# Patient Record
Sex: MALE | Race: WHITE | NOT HISPANIC OR LATINO | Employment: FULL TIME | URBAN - METROPOLITAN AREA
[De-identification: names, ages, dates, MRNs, and addresses within clinical notes are randomized per-mention and may not be internally consistent; named-entity substitution may affect disease eponyms.]

---

## 2022-01-01 ENCOUNTER — TELEPHONE (OUTPATIENT)
Dept: ONCOLOGY | Facility: CLINIC | Age: 57
End: 2022-01-01

## 2022-01-01 ENCOUNTER — LAB (OUTPATIENT)
Dept: INFUSION THERAPY | Facility: CLINIC | Age: 57
End: 2022-01-01
Attending: INTERNAL MEDICINE
Payer: COMMERCIAL

## 2022-01-01 ENCOUNTER — ONCOLOGY VISIT (OUTPATIENT)
Dept: ONCOLOGY | Facility: CLINIC | Age: 57
End: 2022-01-01
Attending: INTERNAL MEDICINE
Payer: COMMERCIAL

## 2022-01-01 ENCOUNTER — PATIENT OUTREACH (OUTPATIENT)
Dept: ONCOLOGY | Facility: CLINIC | Age: 57
End: 2022-01-01

## 2022-01-01 ENCOUNTER — TRANSFERRED RECORDS (OUTPATIENT)
Dept: HEALTH INFORMATION MANAGEMENT | Facility: CLINIC | Age: 57
End: 2022-01-01

## 2022-01-01 ENCOUNTER — LAB (OUTPATIENT)
Dept: INFUSION THERAPY | Facility: CLINIC | Age: 57
End: 2022-01-01
Attending: PHYSICIAN ASSISTANT
Payer: COMMERCIAL

## 2022-01-01 ENCOUNTER — TRANSCRIBE ORDERS (OUTPATIENT)
Dept: OTHER | Age: 57
End: 2022-01-01

## 2022-01-01 ENCOUNTER — ANCILLARY PROCEDURE (OUTPATIENT)
Dept: CT IMAGING | Facility: CLINIC | Age: 57
End: 2022-01-01
Attending: INTERNAL MEDICINE
Payer: COMMERCIAL

## 2022-01-01 ENCOUNTER — HOSPITAL ENCOUNTER (EMERGENCY)
Facility: CLINIC | Age: 57
Discharge: HOME | End: 2022-10-19
Attending: EMERGENCY MEDICINE | Admitting: EMERGENCY MEDICINE
Payer: COMMERCIAL

## 2022-01-01 ENCOUNTER — HEALTH MAINTENANCE LETTER (OUTPATIENT)
Age: 57
End: 2022-01-01

## 2022-01-01 ENCOUNTER — HOSPITAL ENCOUNTER (OUTPATIENT)
Dept: ULTRASOUND IMAGING | Facility: CLINIC | Age: 57
Discharge: HOME | End: 2022-10-19
Attending: INTERNAL MEDICINE
Payer: COMMERCIAL

## 2022-01-01 ENCOUNTER — PRE VISIT (OUTPATIENT)
Dept: ONCOLOGY | Facility: CLINIC | Age: 57
End: 2022-01-01

## 2022-01-01 ENCOUNTER — ONCOLOGY VISIT (OUTPATIENT)
Dept: ONCOLOGY | Facility: CLINIC | Age: 57
End: 2022-01-01
Attending: PHYSICIAN ASSISTANT
Payer: COMMERCIAL

## 2022-01-01 ENCOUNTER — DOCUMENTATION ONLY (OUTPATIENT)
Dept: INFUSION THERAPY | Facility: CLINIC | Age: 57
End: 2022-01-01

## 2022-01-01 VITALS
BODY MASS INDEX: 23.11 KG/M2 | SYSTOLIC BLOOD PRESSURE: 97 MMHG | TEMPERATURE: 97.8 F | RESPIRATION RATE: 16 BRPM | WEIGHT: 152 LBS | OXYGEN SATURATION: 99 % | HEART RATE: 83 BPM | DIASTOLIC BLOOD PRESSURE: 67 MMHG

## 2022-01-01 VITALS
DIASTOLIC BLOOD PRESSURE: 62 MMHG | WEIGHT: 147 LBS | OXYGEN SATURATION: 99 % | HEIGHT: 68 IN | SYSTOLIC BLOOD PRESSURE: 91 MMHG | TEMPERATURE: 97.3 F | BODY MASS INDEX: 22.28 KG/M2 | RESPIRATION RATE: 16 BRPM | HEART RATE: 91 BPM

## 2022-01-01 VITALS
HEART RATE: 83 BPM | DIASTOLIC BLOOD PRESSURE: 63 MMHG | OXYGEN SATURATION: 99 % | SYSTOLIC BLOOD PRESSURE: 94 MMHG | RESPIRATION RATE: 16 BRPM | TEMPERATURE: 97.9 F

## 2022-01-01 VITALS
SYSTOLIC BLOOD PRESSURE: 103 MMHG | BODY MASS INDEX: 23.52 KG/M2 | TEMPERATURE: 97.5 F | HEART RATE: 91 BPM | OXYGEN SATURATION: 100 % | WEIGHT: 155.2 LBS | HEIGHT: 68 IN | RESPIRATION RATE: 19 BRPM | DIASTOLIC BLOOD PRESSURE: 70 MMHG

## 2022-01-01 VITALS
TEMPERATURE: 98.7 F | WEIGHT: 156.09 LBS | BODY MASS INDEX: 23.66 KG/M2 | HEART RATE: 99 BPM | SYSTOLIC BLOOD PRESSURE: 105 MMHG | RESPIRATION RATE: 18 BRPM | HEIGHT: 68 IN | OXYGEN SATURATION: 99 % | DIASTOLIC BLOOD PRESSURE: 69 MMHG

## 2022-01-01 VITALS
BODY MASS INDEX: 22.2 KG/M2 | OXYGEN SATURATION: 100 % | HEIGHT: 68 IN | DIASTOLIC BLOOD PRESSURE: 73 MMHG | SYSTOLIC BLOOD PRESSURE: 104 MMHG | HEART RATE: 81 BPM | WEIGHT: 146.5 LBS | RESPIRATION RATE: 16 BRPM | TEMPERATURE: 97.1 F

## 2022-01-01 VITALS
DIASTOLIC BLOOD PRESSURE: 61 MMHG | TEMPERATURE: 98 F | HEIGHT: 68 IN | RESPIRATION RATE: 18 BRPM | BODY MASS INDEX: 24.31 KG/M2 | HEART RATE: 102 BPM | SYSTOLIC BLOOD PRESSURE: 99 MMHG | WEIGHT: 160.4 LBS

## 2022-01-01 VITALS
HEART RATE: 67 BPM | DIASTOLIC BLOOD PRESSURE: 63 MMHG | SYSTOLIC BLOOD PRESSURE: 103 MMHG | RESPIRATION RATE: 18 BRPM | OXYGEN SATURATION: 98 % | TEMPERATURE: 97.4 F

## 2022-01-01 DIAGNOSIS — I82.622 ACUTE DEEP VEIN THROMBOSIS (DVT) OF OTHER VEIN OF LEFT UPPER EXTREMITY (H): ICD-10-CM

## 2022-01-01 DIAGNOSIS — C15.9 ESOPHAGEAL ADENOCARCINOMA (H): Primary | ICD-10-CM

## 2022-01-01 DIAGNOSIS — I82.C29 CHRONIC DEEP VEIN THROMBOSIS (DVT) OF INTERNAL JUGULAR VEIN (H): ICD-10-CM

## 2022-01-01 DIAGNOSIS — I82.C29 CHRONIC DEEP VEIN THROMBOSIS (DVT) OF INTERNAL JUGULAR VEIN (H): Primary | ICD-10-CM

## 2022-01-01 DIAGNOSIS — C15.9 ESOPHAGEAL CANCER (H): Primary | ICD-10-CM

## 2022-01-01 DIAGNOSIS — C15.9 ESOPHAGEAL ADENOCARCINOMA (H): ICD-10-CM

## 2022-01-01 LAB
ALBUMIN SERPL BCG-MCNC: 3.1 G/DL (ref 3.5–5.2)
ALBUMIN SERPL BCG-MCNC: 3.1 G/DL (ref 3.5–5.2)
ALBUMIN SERPL BCG-MCNC: 3.2 G/DL (ref 3.5–5.2)
ALBUMIN SERPL-MCNC: 2.7 G/DL (ref 3.4–5)
ALP SERPL-CCNC: 101 U/L (ref 40–129)
ALP SERPL-CCNC: 113 U/L (ref 40–129)
ALP SERPL-CCNC: 89 U/L (ref 40–150)
ALP SERPL-CCNC: 98 U/L (ref 40–129)
ALT SERPL W P-5'-P-CCNC: 27 U/L (ref 0–70)
ALT SERPL W P-5'-P-CCNC: 27 U/L (ref 10–50)
ALT SERPL W P-5'-P-CCNC: 37 U/L (ref 10–50)
ALT SERPL W P-5'-P-CCNC: 42 U/L (ref 10–50)
ANION GAP SERPL CALCULATED.3IONS-SCNC: 10 MMOL/L (ref 7–15)
ANION GAP SERPL CALCULATED.3IONS-SCNC: 3 MMOL/L (ref 3–14)
ANION GAP SERPL CALCULATED.3IONS-SCNC: 7 MMOL/L (ref 7–15)
ANION GAP SERPL CALCULATED.3IONS-SCNC: 9 MMOL/L (ref 7–15)
AST SERPL W P-5'-P-CCNC: 11 U/L (ref 10–50)
AST SERPL W P-5'-P-CCNC: 13 U/L (ref 10–50)
AST SERPL W P-5'-P-CCNC: 7 U/L (ref 0–45)
AST SERPL W P-5'-P-CCNC: 8 U/L (ref 10–50)
BASOPHILS # BLD AUTO: 0 10E3/UL (ref 0–0.2)
BASOPHILS # BLD MANUAL: 0 10E3/UL (ref 0–0.2)
BASOPHILS NFR BLD AUTO: 0 %
BASOPHILS NFR BLD AUTO: 1 %
BASOPHILS NFR BLD AUTO: 2 %
BASOPHILS NFR BLD MANUAL: 0 %
BILIRUB SERPL-MCNC: 0.4 MG/DL
BILIRUB SERPL-MCNC: 0.4 MG/DL
BILIRUB SERPL-MCNC: 0.4 MG/DL (ref 0.2–1.3)
BILIRUB SERPL-MCNC: 0.5 MG/DL
BUN SERPL-MCNC: 10.8 MG/DL (ref 8–23)
BUN SERPL-MCNC: 11.6 MG/DL (ref 8–23)
BUN SERPL-MCNC: 11.9 MG/DL (ref 8–23)
BUN SERPL-MCNC: 13 MG/DL (ref 7–30)
CALCIUM SERPL-MCNC: 8.6 MG/DL (ref 8.5–10.1)
CALCIUM SERPL-MCNC: 8.6 MG/DL (ref 8.6–10)
CALCIUM SERPL-MCNC: 8.7 MG/DL (ref 8.6–10)
CALCIUM SERPL-MCNC: 8.8 MG/DL (ref 8.6–10)
CEA SERPL-MCNC: 14.3 NG/ML
CEA SERPL-MCNC: 3.2 NG/ML
CEA SERPL-MCNC: 6.9 NG/ML
CHLORIDE BLD-SCNC: 106 MMOL/L (ref 94–109)
CHLORIDE SERPL-SCNC: 100 MMOL/L (ref 98–107)
CHLORIDE SERPL-SCNC: 105 MMOL/L (ref 98–107)
CHLORIDE SERPL-SCNC: 106 MMOL/L (ref 98–107)
CO2 SERPL-SCNC: 29 MMOL/L (ref 20–32)
CREAT SERPL-MCNC: 0.69 MG/DL (ref 0.67–1.17)
CREAT SERPL-MCNC: 0.71 MG/DL (ref 0.66–1.25)
CREAT SERPL-MCNC: 0.75 MG/DL (ref 0.67–1.17)
CREAT SERPL-MCNC: 0.79 MG/DL (ref 0.67–1.17)
DEPRECATED HCO3 PLAS-SCNC: 25 MMOL/L (ref 22–29)
DEPRECATED HCO3 PLAS-SCNC: 25 MMOL/L (ref 22–29)
DEPRECATED HCO3 PLAS-SCNC: 31 MMOL/L (ref 22–29)
EOSINOPHIL # BLD AUTO: 0 10E3/UL (ref 0–0.7)
EOSINOPHIL # BLD MANUAL: 0 10E3/UL (ref 0–0.7)
EOSINOPHIL NFR BLD AUTO: 0 %
EOSINOPHIL NFR BLD AUTO: 1 %
EOSINOPHIL NFR BLD AUTO: 2 %
EOSINOPHIL NFR BLD MANUAL: 0 %
ERYTHROCYTE [DISTWIDTH] IN BLOOD BY AUTOMATED COUNT: 19.2 % (ref 10–15)
ERYTHROCYTE [DISTWIDTH] IN BLOOD BY AUTOMATED COUNT: 19.6 % (ref 10–15)
ERYTHROCYTE [DISTWIDTH] IN BLOOD BY AUTOMATED COUNT: 19.7 % (ref 10–15)
ERYTHROCYTE [DISTWIDTH] IN BLOOD BY AUTOMATED COUNT: 20.7 % (ref 10–15)
GFR SERPL CREATININE-BSD FRML MDRD: >90 ML/MIN/1.73M2
GLUCOSE BLD-MCNC: 139 MG/DL (ref 70–99)
GLUCOSE SERPL-MCNC: 112 MG/DL (ref 70–99)
GLUCOSE SERPL-MCNC: 142 MG/DL (ref 70–99)
GLUCOSE SERPL-MCNC: 76 MG/DL (ref 70–99)
HCT VFR BLD AUTO: 34 % (ref 40–53)
HCT VFR BLD AUTO: 34.3 % (ref 40–53)
HCT VFR BLD AUTO: 34.5 % (ref 40–53)
HCT VFR BLD AUTO: 36.5 % (ref 40–53)
HGB BLD-MCNC: 10.5 G/DL (ref 13.3–17.7)
HGB BLD-MCNC: 10.7 G/DL (ref 13.3–17.7)
HGB BLD-MCNC: 11.3 G/DL (ref 13.3–17.7)
HGB BLD-MCNC: 11.5 G/DL (ref 13.3–17.7)
IMM GRANULOCYTES # BLD: 0 10E3/UL
IMM GRANULOCYTES # BLD: 0 10E3/UL
IMM GRANULOCYTES # BLD: 0.1 10E3/UL
IMM GRANULOCYTES NFR BLD: 1 %
IMM GRANULOCYTES NFR BLD: 1 %
IMM GRANULOCYTES NFR BLD: 2 %
LYMPHOCYTES # BLD AUTO: 0.6 10E3/UL (ref 0.8–5.3)
LYMPHOCYTES # BLD AUTO: 0.8 10E3/UL (ref 0.8–5.3)
LYMPHOCYTES # BLD AUTO: 1.2 10E3/UL (ref 0.8–5.3)
LYMPHOCYTES # BLD MANUAL: 1.1 10E3/UL (ref 0.8–5.3)
LYMPHOCYTES NFR BLD AUTO: 20 %
LYMPHOCYTES NFR BLD AUTO: 27 %
LYMPHOCYTES NFR BLD AUTO: 32 %
LYMPHOCYTES NFR BLD MANUAL: 25 %
MCH RBC QN AUTO: 30 PG (ref 26.5–33)
MCH RBC QN AUTO: 30.4 PG (ref 26.5–33)
MCH RBC QN AUTO: 31.5 PG (ref 26.5–33)
MCH RBC QN AUTO: 32.8 PG (ref 26.5–33)
MCHC RBC AUTO-ENTMCNC: 30.9 G/DL (ref 31.5–36.5)
MCHC RBC AUTO-ENTMCNC: 31.2 G/DL (ref 31.5–36.5)
MCHC RBC AUTO-ENTMCNC: 31.5 G/DL (ref 31.5–36.5)
MCHC RBC AUTO-ENTMCNC: 32.8 G/DL (ref 31.5–36.5)
MCV RBC AUTO: 100 FL (ref 78–100)
MCV RBC AUTO: 101 FL (ref 78–100)
MCV RBC AUTO: 97 FL (ref 78–100)
MCV RBC AUTO: 97 FL (ref 78–100)
METAMYELOCYTES # BLD MANUAL: 0.4 10E3/UL
METAMYELOCYTES NFR BLD MANUAL: 9 %
MONOCYTES # BLD AUTO: 0.4 10E3/UL (ref 0–1.3)
MONOCYTES # BLD AUTO: 0.4 10E3/UL (ref 0–1.3)
MONOCYTES # BLD AUTO: 0.7 10E3/UL (ref 0–1.3)
MONOCYTES # BLD MANUAL: 0.2 10E3/UL (ref 0–1.3)
MONOCYTES NFR BLD AUTO: 18 %
MONOCYTES NFR BLD AUTO: 23 %
MONOCYTES NFR BLD AUTO: 6 %
MONOCYTES NFR BLD MANUAL: 5 %
NEUTROPHILS # BLD AUTO: 0.9 10E3/UL (ref 1.6–8.3)
NEUTROPHILS # BLD AUTO: 1.4 10E3/UL (ref 1.6–8.3)
NEUTROPHILS # BLD AUTO: 4.4 10E3/UL (ref 1.6–8.3)
NEUTROPHILS # BLD MANUAL: 2.7 10E3/UL (ref 1.6–8.3)
NEUTROPHILS NFR BLD AUTO: 45 %
NEUTROPHILS NFR BLD AUTO: 47 %
NEUTROPHILS NFR BLD AUTO: 72 %
NEUTROPHILS NFR BLD MANUAL: 61 %
NRBC # BLD AUTO: 0 10E3/UL
NRBC # BLD AUTO: 0.1 10E3/UL
NRBC BLD AUTO-RTO: 0 /100
NRBC BLD AUTO-RTO: 0 /100
NRBC BLD AUTO-RTO: 1 /100
NRBC BLD MANUAL-RTO: 2 %
PLAT MORPH BLD: ABNORMAL
PLAT MORPH BLD: NORMAL
PLATELET # BLD AUTO: 155 10E3/UL (ref 150–450)
PLATELET # BLD AUTO: 162 10E3/UL (ref 150–450)
PLATELET # BLD AUTO: 261 10E3/UL (ref 150–450)
PLATELET # BLD AUTO: 351 10E3/UL (ref 150–450)
POTASSIUM BLD-SCNC: 3.9 MMOL/L (ref 3.4–5.3)
POTASSIUM SERPL-SCNC: 3.8 MMOL/L (ref 3.4–5.3)
POTASSIUM SERPL-SCNC: 4.2 MMOL/L (ref 3.4–5.3)
POTASSIUM SERPL-SCNC: 4.2 MMOL/L (ref 3.4–5.3)
PROT SERPL-MCNC: 5.7 G/DL (ref 6.4–8.3)
PROT SERPL-MCNC: 5.8 G/DL (ref 6.4–8.3)
PROT SERPL-MCNC: 6 G/DL (ref 6.4–8.3)
PROT SERPL-MCNC: 6.5 G/DL (ref 6.8–8.8)
RBC # BLD AUTO: 3.4 10E6/UL (ref 4.4–5.9)
RBC # BLD AUTO: 3.45 10E6/UL (ref 4.4–5.9)
RBC # BLD AUTO: 3.5 10E6/UL (ref 4.4–5.9)
RBC # BLD AUTO: 3.78 10E6/UL (ref 4.4–5.9)
RBC MORPH BLD: ABNORMAL
RBC MORPH BLD: NORMAL
SODIUM SERPL-SCNC: 138 MMOL/L (ref 133–144)
SODIUM SERPL-SCNC: 138 MMOL/L (ref 136–145)
SODIUM SERPL-SCNC: 140 MMOL/L (ref 136–145)
SODIUM SERPL-SCNC: 140 MMOL/L (ref 136–145)
TSH SERPL DL<=0.005 MIU/L-ACNC: 0.92 MU/L (ref 0.4–4)
TSH SERPL DL<=0.005 MIU/L-ACNC: 0.93 UIU/ML (ref 0.3–4.2)
TSH SERPL DL<=0.005 MIU/L-ACNC: 3.27 UIU/ML (ref 0.3–4.2)
WBC # BLD AUTO: 2 10E3/UL (ref 4–11)
WBC # BLD AUTO: 3 10E3/UL (ref 4–11)
WBC # BLD AUTO: 4.4 10E3/UL (ref 4–11)
WBC # BLD AUTO: 6 10E3/UL (ref 4–11)

## 2022-01-01 PROCEDURE — 250N000011 HC RX IP 250 OP 636: Performed by: INTERNAL MEDICINE

## 2022-01-01 PROCEDURE — 36591 DRAW BLOOD OFF VENOUS DEVICE: CPT

## 2022-01-01 PROCEDURE — 36415 COLL VENOUS BLD VENIPUNCTURE: CPT | Performed by: INTERNAL MEDICINE

## 2022-01-01 PROCEDURE — 96415 CHEMO IV INFUSION ADDL HR: CPT

## 2022-01-01 PROCEDURE — 96411 CHEMO IV PUSH ADDL DRUG: CPT

## 2022-01-01 PROCEDURE — 82378 CARCINOEMBRYONIC ANTIGEN: CPT | Performed by: INTERNAL MEDICINE

## 2022-01-01 PROCEDURE — 96417 CHEMO IV INFUS EACH ADDL SEQ: CPT

## 2022-01-01 PROCEDURE — 250N000011 HC RX IP 250 OP 636: Performed by: PHYSICIAN ASSISTANT

## 2022-01-01 PROCEDURE — G0498 CHEMO EXTEND IV INFUS W/PUMP: HCPCS

## 2022-01-01 PROCEDURE — 99213 OFFICE O/P EST LOW 20 MIN: CPT | Performed by: PHYSICIAN ASSISTANT

## 2022-01-01 PROCEDURE — 97802 MEDICAL NUTRITION INDIV IN: CPT | Mod: GT | Performed by: DIETITIAN, REGISTERED

## 2022-01-01 PROCEDURE — 84443 ASSAY THYROID STIM HORMONE: CPT | Performed by: PHYSICIAN ASSISTANT

## 2022-01-01 PROCEDURE — 99205 OFFICE O/P NEW HI 60 MIN: CPT | Performed by: INTERNAL MEDICINE

## 2022-01-01 PROCEDURE — 93970 EXTREMITY STUDY: CPT | Mod: XS

## 2022-01-01 PROCEDURE — 96413 CHEMO IV INFUSION 1 HR: CPT

## 2022-01-01 PROCEDURE — 96367 TX/PROPH/DG ADDL SEQ IV INF: CPT

## 2022-01-01 PROCEDURE — 96368 THER/DIAG CONCURRENT INF: CPT

## 2022-01-01 PROCEDURE — 80053 COMPREHEN METABOLIC PANEL: CPT | Performed by: INTERNAL MEDICINE

## 2022-01-01 PROCEDURE — 84443 ASSAY THYROID STIM HORMONE: CPT | Performed by: INTERNAL MEDICINE

## 2022-01-01 PROCEDURE — 93970 EXTREMITY STUDY: CPT

## 2022-01-01 PROCEDURE — 258N000003 HC RX IP 258 OP 636: Performed by: PHYSICIAN ASSISTANT

## 2022-01-01 PROCEDURE — 258N000003 HC RX IP 258 OP 636: Performed by: INTERNAL MEDICINE

## 2022-01-01 PROCEDURE — 80053 COMPREHEN METABOLIC PANEL: CPT | Performed by: PHYSICIAN ASSISTANT

## 2022-01-01 PROCEDURE — 99214 OFFICE O/P EST MOD 30 MIN: CPT | Performed by: INTERNAL MEDICINE

## 2022-01-01 PROCEDURE — G0463 HOSPITAL OUTPT CLINIC VISIT: HCPCS

## 2022-01-01 PROCEDURE — 74177 CT ABD & PELVIS W/CONTRAST: CPT

## 2022-01-01 PROCEDURE — 85025 COMPLETE CBC W/AUTO DIFF WBC: CPT | Performed by: INTERNAL MEDICINE

## 2022-01-01 PROCEDURE — 85027 COMPLETE CBC AUTOMATED: CPT | Performed by: PHYSICIAN ASSISTANT

## 2022-01-01 PROCEDURE — 255N000002 HC RX 255 OP 636: Performed by: INTERNAL MEDICINE

## 2022-01-01 PROCEDURE — 85004 AUTOMATED DIFF WBC COUNT: CPT | Performed by: INTERNAL MEDICINE

## 2022-01-01 PROCEDURE — 99283 EMERGENCY DEPT VISIT LOW MDM: CPT

## 2022-01-01 PROCEDURE — 82040 ASSAY OF SERUM ALBUMIN: CPT | Performed by: PHYSICIAN ASSISTANT

## 2022-01-01 PROCEDURE — 85007 BL SMEAR W/DIFF WBC COUNT: CPT | Performed by: PHYSICIAN ASSISTANT

## 2022-01-01 PROCEDURE — 96375 TX/PRO/DX INJ NEW DRUG ADDON: CPT

## 2022-01-01 PROCEDURE — 82374 ASSAY BLOOD CARBON DIOXIDE: CPT | Performed by: INTERNAL MEDICINE

## 2022-01-01 RX ORDER — HEPARIN SODIUM (PORCINE) LOCK FLUSH IV SOLN 100 UNIT/ML 100 UNIT/ML
5 SOLUTION INTRAVENOUS
Status: DISCONTINUED | OUTPATIENT
Start: 2022-01-01 | End: 2022-01-01 | Stop reason: HOSPADM

## 2022-01-01 RX ORDER — LORAZEPAM 2 MG/ML
0.5 INJECTION INTRAMUSCULAR EVERY 4 HOURS PRN
Status: CANCELLED | OUTPATIENT
Start: 2022-01-01

## 2022-01-01 RX ORDER — FLUOROURACIL 50 MG/ML
400 INJECTION, SOLUTION INTRAVENOUS ONCE
Status: COMPLETED | OUTPATIENT
Start: 2022-01-01 | End: 2022-01-01

## 2022-01-01 RX ORDER — HEPARIN SODIUM,PORCINE 10 UNIT/ML
5 VIAL (ML) INTRAVENOUS
Status: DISCONTINUED | OUTPATIENT
Start: 2022-01-01 | End: 2022-01-01 | Stop reason: HOSPADM

## 2022-01-01 RX ORDER — DIPHENHYDRAMINE HYDROCHLORIDE 50 MG/ML
50 INJECTION INTRAMUSCULAR; INTRAVENOUS
Status: CANCELLED
Start: 2022-01-01

## 2022-01-01 RX ORDER — HEPARIN SODIUM,PORCINE 10 UNIT/ML
5 VIAL (ML) INTRAVENOUS
Status: CANCELLED | OUTPATIENT
Start: 2022-01-01

## 2022-01-01 RX ORDER — ALBUTEROL SULFATE 0.83 MG/ML
2.5 SOLUTION RESPIRATORY (INHALATION)
Status: CANCELLED | OUTPATIENT
Start: 2022-01-01

## 2022-01-01 RX ORDER — HEPARIN SODIUM (PORCINE) LOCK FLUSH IV SOLN 100 UNIT/ML 100 UNIT/ML
5 SOLUTION INTRAVENOUS
Status: CANCELLED | OUTPATIENT
Start: 2022-01-01

## 2022-01-01 RX ORDER — ONDANSETRON 8 MG/1
8 TABLET, FILM COATED ORAL EVERY 8 HOURS PRN
Qty: 30 TABLET | Refills: 1 | Status: SHIPPED | OUTPATIENT
Start: 2022-01-01 | End: 2023-01-01

## 2022-01-01 RX ORDER — METHYLPREDNISOLONE SODIUM SUCCINATE 125 MG/2ML
125 INJECTION, POWDER, LYOPHILIZED, FOR SOLUTION INTRAMUSCULAR; INTRAVENOUS
Status: CANCELLED
Start: 2022-01-01

## 2022-01-01 RX ORDER — ONDANSETRON 2 MG/ML
8 INJECTION INTRAMUSCULAR; INTRAVENOUS ONCE
Status: CANCELLED | OUTPATIENT
Start: 2022-01-01

## 2022-01-01 RX ORDER — ALBUTEROL SULFATE 90 UG/1
1-2 AEROSOL, METERED RESPIRATORY (INHALATION)
Status: CANCELLED
Start: 2022-01-01

## 2022-01-01 RX ORDER — EPINEPHRINE 1 MG/ML
0.3 INJECTION, SOLUTION INTRAMUSCULAR; SUBCUTANEOUS EVERY 5 MIN PRN
Status: CANCELLED | OUTPATIENT
Start: 2022-01-01

## 2022-01-01 RX ORDER — FLUOROURACIL 50 MG/ML
400 INJECTION, SOLUTION INTRAVENOUS ONCE
Status: CANCELLED | OUTPATIENT
Start: 2022-01-01

## 2022-01-01 RX ORDER — DEXAMETHASONE 4 MG/1
4 TABLET ORAL 2 TIMES DAILY WITH MEALS
Status: ON HOLD | COMMUNITY
End: 2023-01-01

## 2022-01-01 RX ORDER — ACETAMINOPHEN 325 MG/1
650 TABLET ORAL PRN
Status: ON HOLD | COMMUNITY
End: 2023-01-01

## 2022-01-01 RX ORDER — ONDANSETRON 2 MG/ML
8 INJECTION INTRAMUSCULAR; INTRAVENOUS ONCE
Status: COMPLETED | OUTPATIENT
Start: 2022-01-01 | End: 2022-01-01

## 2022-01-01 RX ORDER — MEPERIDINE HYDROCHLORIDE 25 MG/ML
25 INJECTION INTRAMUSCULAR; INTRAVENOUS; SUBCUTANEOUS EVERY 30 MIN PRN
Status: CANCELLED | OUTPATIENT
Start: 2022-01-01

## 2022-01-01 RX ORDER — PROCHLORPERAZINE MALEATE 10 MG
10 TABLET ORAL EVERY 6 HOURS PRN
Status: ON HOLD | COMMUNITY
End: 2023-01-01

## 2022-01-01 RX ADMIN — Medication 5 ML: at 07:41

## 2022-01-01 RX ADMIN — SODIUM CHLORIDE 240 MG: 9 INJECTION, SOLUTION INTRAVENOUS at 13:49

## 2022-01-01 RX ADMIN — OXALIPLATIN 150 MG: 100 INJECTION, SOLUTION, CONCENTRATE INTRAVENOUS at 10:23

## 2022-01-01 RX ADMIN — FOSAPREPITANT: 150 INJECTION, POWDER, LYOPHILIZED, FOR SOLUTION INTRAVENOUS at 08:58

## 2022-01-01 RX ADMIN — DEXTROSE MONOHYDRATE 250 ML: 50 INJECTION, SOLUTION INTRAVENOUS at 10:18

## 2022-01-01 RX ADMIN — FLUOROURACIL 715 MG: 50 INJECTION, SOLUTION INTRAVENOUS at 16:26

## 2022-01-01 RX ADMIN — LEUCOVORIN CALCIUM 650 MG: 200 INJECTION, POWDER, LYOPHILIZED, FOR SUSPENSION INTRAMUSCULAR; INTRAVENOUS at 14:29

## 2022-01-01 RX ADMIN — OXALIPLATIN 150 MG: 100 INJECTION, SOLUTION, CONCENTRATE INTRAVENOUS at 14:29

## 2022-01-01 RX ADMIN — ONDANSETRON 8 MG: 2 INJECTION INTRAMUSCULAR; INTRAVENOUS at 13:16

## 2022-01-01 RX ADMIN — ONDANSETRON 8 MG: 2 INJECTION INTRAMUSCULAR; INTRAVENOUS at 08:58

## 2022-01-01 RX ADMIN — DEXAMETHASONE SODIUM PHOSPHATE: 10 INJECTION, SOLUTION INTRAMUSCULAR; INTRAVENOUS at 13:18

## 2022-01-01 RX ADMIN — SODIUM CHLORIDE 1000 ML: 9 INJECTION, SOLUTION INTRAVENOUS at 08:39

## 2022-01-01 RX ADMIN — SODIUM CHLORIDE 240 MG: 9 INJECTION, SOLUTION INTRAVENOUS at 09:31

## 2022-01-01 RX ADMIN — Medication 5 ML: at 09:57

## 2022-01-01 RX ADMIN — DEXTROSE MONOHYDRATE 250 ML: 50 INJECTION, SOLUTION INTRAVENOUS at 14:23

## 2022-01-01 RX ADMIN — Medication 5 ML: at 14:06

## 2022-01-01 RX ADMIN — FLUOROURACIL 715 MG: 50 INJECTION, SOLUTION INTRAVENOUS at 12:29

## 2022-01-01 RX ADMIN — SODIUM CHLORIDE 250 ML: 9 INJECTION, SOLUTION INTRAVENOUS at 13:16

## 2022-01-01 RX ADMIN — LEUCOVORIN CALCIUM 650 MG: 200 INJECTION, POWDER, LYOPHILIZED, FOR SUSPENSION INTRAMUSCULAR; INTRAVENOUS at 10:19

## 2022-01-01 RX ADMIN — Medication 5 ML: at 10:33

## 2022-01-01 RX ADMIN — IOHEXOL 100 ML: 350 INJECTION, SOLUTION INTRAVENOUS at 10:28

## 2022-01-01 ASSESSMENT — PAIN SCALES - GENERAL
PAINLEVEL: NO PAIN (0)

## 2022-06-30 ENCOUNTER — TRANSFERRED RECORDS (OUTPATIENT)
Dept: HEALTH INFORMATION MANAGEMENT | Facility: CLINIC | Age: 57
End: 2022-06-30

## 2022-09-23 NOTE — TELEPHONE ENCOUNTER
Action October 4, 2022 10:32 AM ABT   Action Taken Images were resolved under incorrect MRN, [facility] notified and they will resolve under correct MRN.     Action October 1, 2022 11:56 AM ABT   Action Taken Sent email to Deedee for STAT/ASAP upload for imaging discs     Action September 26, 2022 10:21 AM ABT   Action Taken Imaging disc from [facility] [place] (08/08/22: PET), and [facility] (09/08/22: MR MRCP, and 07/21/22 : CT CAP) received and taken to Tay for upload     Action    Action Taken 9/23/22  WT from NPS, spoke w/ Radha -     Pt was originally diagnosed 6/30/2022 @ [facility] in [place], [place]    Pt had a PET Scan done 8/8/22, unsure of location.     Pt's Oncologist is Dr. Roge Jones - notes do not appear in CE.     Radha advised pt had prior work up w/ a GI specialist, including a Barium Study ([facility]). Unsure of location of GI provider. Radha will look through notes & update us shortly.     Discussed possibility of needing an CRISTIAN to obtain records. Emailed CRISTIAN. Radha confirmed receipt, will have brother fill out & return to us.     Spoke w/ Dr. Wagner' Medical Records team, ph: [phone], fax: [phone]. Faxed records request. They advised pt's PET was done @ [facility] (located in the same building as Dr. Laughlin).     Spoke w/ [facility] ph: [phone], fax: [phone] - faxed request. They advised disc would go out in the mail today.  11:04 AM    1:46 PM ABT  Records from [facility] and [facility] [place] received and sent to HIM for upload.     RECORDS STATUS - ALL OTHER DIAGNOSIS      RECORDS RECEIVED FROM: [facility], [facility], [facility], [facility]   DATE RECEIVED:    NOTES STATUS DETAILS   OFFICE NOTE from referring provider Self    OFFICE NOTE from medical oncologist  Received 9/23 09/15/22: Dr. Roge Laughlin   DISCHARGE SUMMARY from hospital MAU - [facility]  22   DISCHARGE REPORT from the ER CE - [facility]  22   OPERATIVE REPORT  - [facility] 22: EGD   MEDICATION LIST  [facility]   LABS     PATHOLOGY REPORTS Req 10/01-Nicole  [employer] 22: [id number]    ANYTHING RELATED TO DIAGNOSIS Epic/CE 9/15/22   IMAGING (NEED IMAGES & REPORT)  [facility] [employer]    [facility] [employer]     [facility] [employer]   CT SCANS PACS 22: [facility]   MRI PACS 22: [facility]   FL/Barium Swallow Requested 22: [facility]   PET PACS 22: PET Tumor

## 2022-10-03 NOTE — PROGRESS NOTES
[facility]    Hematology/Oncology New Patient Note      Today's Date: 10/4/22    Reason for Consultation: Esophageal cancer      HISTORY OF PRESENT ILLNESS: Noah Morrison is a 57 year old male who presents with stage IV esophageal adenocarcinoma.     Patient developed dysphagia to solids beginning in mid-May 2022 accomopanied by unintentional >35 lb weight loss. Esophagogram was performed on 6/29/22: Significant narrowing and mucosal irregularity involving the distal thoracic esophagus with partial obstruction, most likely representing an esophageal carcinoma. A significant esophagitis is an additional although less likely possibility.     EGD was performed on 6/30/22 with near obstructing mass int he thoracic esophagus approximately 40 cm from the incisors. Path returned as poorly differentiated adenocarcinoma with focal signet ring morphology.    Evaluated by med onc and rad onc with PET/CT ordered. PEG tube was placed with IR given continued weight loss.    Per record review (9/15/22- [facility]):   -Esophageal cancer, middle third:  -6/30/22: Esophageal biopsy: poorly differentiated adenocarcinoma with focal signet ring morphology (HER2 0+; no evidence of MSI-H).  -7/12/22: CEA 21.1.  -8/8/22: PET scan: Distal esophageal thickening with intense hypermetabolic activity consistent with known esophgeal cancer. Abnormal hypermetabolic metastatic cervical, supraclavicular, peritoneal, and retroperitoneal LAD.   -8/9-8/23/22: Concurrent chemoradiation with weekly carboplatin plus paclitaxel.  -CARIS: PDL CPS5, ERBB2 negative, MSI stable, MMR proficient, TMB low, mutations identified in AXIN1, CDKN21, and TP53.  -Started on FOLFOX plus nivolumab on 9/15/22. Last treatment on 9/29/22.    Patient has had MRCP performed due to elevated liver enzymes which revealed a peripancreatic LN causing a blockage and dilation of the CBD. A metal stent was placed and LFTs  "improved. He continues to have weight loss and pain. He is on oxycodone.     He was using the feeding tube previously 3-4x/day, but has not used in the last 1-2 weeks. He is back to normal meals. No boost. Weight nadired down to 137 lbs and he is now 146 lbs.     He previously worked as a  at a hotel in [place]. Relocated from [place]. He lived there for five years. He is currently living with his sister, Radha.       REVIEW OF SYSTEMS:   A 14 point ROS was reviewed with pertinent positives and negatives in the HPI.        HOME MEDICATIONS:  No current outpatient medications on file.         ALLERGIES:  NKDA.    PAST MEDICAL HISTORY:  Esophageal cancer    PAST SURGICAL HISTORY:  Tonsillectomy  Port placement  PEG tube placement    SOCIAL HISTORY:  Social History     Socioeconomic History     Marital status: Single     Spouse name: Not on file     Number of children: Not on file     Years of education: Not on file     Highest education level: Not on file   Occupational History     Not on file   Tobacco Use     Smoking status: Not on file     Smokeless tobacco: Not on file   Substance and Sexual Activity     Alcohol use: Not on file     Drug use: Not on file     Sexual activity: Not on file   Other Topics Concern     Not on file   Social History Narrative     Not on file     Social Determinants of Health     Financial Resource Strain: Not on file   Food Insecurity: Not on file   Transportation Needs: Not on file   Physical Activity: Not on file   Stress: Not on file   Social Connections: Not on file   Intimate Partner Violence: Not on file   Housing Stability: Not on file     Smoked 1 PPD starting at age 38, quit at diagnosis.     FAMILY HISTORY:  Father: 84, living, esophageal cancer.   Mother: 80.    PHYSICAL EXAM:  Vital signs:  /73   Pulse 81   Temp 97.1  F (36.2  C) (Tympanic)   Resp 16   Ht 1.727 m (5' 8\")   Wt 66.5 kg (146 lb 8 oz)   SpO2 100%   BMI 22.28 kg/m     ECO-1  GENERAL/CONSTITUTIONAL: No " acute distress.  EYES: Pupils are equal, round, and react to light and accommodation. Extraocular movements intact.  No scleral icterus.  ENT/MOUTH: Neck supple. Oropharynx clear, no mucositis.  LYMPH: No anterior cervical, posterior cervical, supraclavicular, axillary or inguinal adenopathy.   RESPIRATORY: Clear to auscultation bilaterally. No crackles or wheezing.   CARDIOVASCULAR: Regular rate and rhythm without murmurs, gallops, or rubs.  GASTROINTESTINAL: No hepatosplenomegaly, masses, or tenderness. The patient has normal bowel sounds. No guarding.  No distention.  MUSCULOSKELETAL: Warm and well-perfused, no cyanosis, clubbing, or edema.  NEUROLOGIC: Cranial nerves II-XII are intact. Alert, oriented, answers questions appropriately.  INTEGUMENTARY: No rashes or jaundice.  GAIT: Steady, does not use assistive device      LABS:  9/15/22: WBC 6.10, Hb 13.7, hematocrit 40.5, MCV 89.6, .     Na 137, K 4.5, Cl 101, CO2 25, Cr 1.11, total protein 7.7, albumin 3.1. T. Bili 0.9, Alk phos 323, AST 13, ALT 90.      PATHOLOGY:  EGD 6/30/22:  Procedural Details/Findings:   After informed consent the patient was brought to the endoscopy suite. IV sedation was administered. The endoscope was advanced under direct vision until the mass in mid esophagus was encountered. The mass was approximately 40 cm from the incisors. Multiple biopsies were obtained of this mass with the cold biopsy forceps. The area was narrow enough that we could not pass the scope through it.     There where no complications.    Findings:  Nearly obstructing mass in the thoracic esophagus. It appeared to be neoplastic in nature.     PATHOLOGY - 07/08/2022 1:58 PM MDT    CASE: [id number]  PATIENT: DEBO GRAVES    SPECIMENS SUBMITTED:  A. ESOPHAGEAL MASS BX      DIAGNOSIS:  A.   ESOPHAGEAL MASS, ENDOSCOPIC BIOPSY:      -    POSITIVE FOR POORLY-DIFFERENTIATED ADENOCARCINOMA WITH FOCAL  SIGNET RING MORPHOLOGY (SEE NOTE)    NOTE:  The IHC stains  "were performed and showed malignant cells to be positive for  CK7(+) and negative for CD-45(-),  CD-30(-), CK5/p-40(-), Melanoma cocktail(-). The tissue in the block A1 is  available for additional molecular  testing as clinically indicated. Correlation with clinical history and  radiology images is recommended.      IMMUNOHISTOCHEMICAL STUDIES:  TISSUE BLOCK:  A1  HER2 PROTEIN EXPRESSION (0-3+):    0+  TECHNICAL COMMENT:  Following collection, the tissue sample was promptly (< 60 minutes) placed  in 10% neutral buffered formalin. Fixation was for 6-72 hours, followed by  paraffin embedding.  A polymer-based immunohistochemical analysis of estrogen/progesterone  receptor and Her2 protein expression (clones SP1, 1E2 and 4B5,  respectively) was performed. The steroid receptor result is positive if at  least 1% of tumor cell nuclei show specific staining. For positive cases  the \"fraction of cells staining\" is a visual estimate subject to observer  variation. For Her2, membrane positivity was scored according to the 2014  ASCO/CAP consensus (Arch Pathol Lab Med 138:241-56, 2014): 0-1+ is negative  for overexpression, 2+ is equivocal, and 3+ is positive.      MISMATCH REPAIR PROTEIN EXPRESSION  MLH-1:    Positive (protein expressed)  PMS-2:    Positive (protein expressed)  MSH-2:    Positive (protein expressed)  MSH-6:    Positive (protein expressed)  Comment:  No evidence of MSI-high.    CARIS:  PD-L1 CPS 5, positive.  ERBB2 HER2/silvina negative.    IMAGIN2022 11:14 AM MDT    IMPRESSION: Significant narrowing and mucosal irregularity involving  the distal thoracic esophagus with partial obstruction, most likely  representing an esophageal carcinoma. A significant esophagitis is an  additional although less likely possibility. Direct visualization  will be necessary.       Imaging Results - FL esophagram complete (2022 10:09 AM MDT)  Narrative   2022 11:14 AM MDT    CLINICAL INDICATION:  dysphagia  TC "  .30 ISIDRO FLUORO, 12 mGY    EXAM:  ESOPHAGRAM BARIUM SWALLOW      COMPARISON:  None    FINDINGS:    Approximately 30 seconds of fluoroscopy time were used and 12 images  obtained.    Barium and air were administered orally under fluoroscopic guidance.  The pharynx and cervical esophagus were not studied due to a partial  obstruction in the distal thoracic esophagus which did not allow  further administration of barium.    The distal fourth of the thoracic esophagus was grossly abnormal with  significant mucosal irregularity and narrowing with abrupt transition  and moderate proximal esophageal distention and air-fluid level,  consistent with partial obstruction. The visualized GE junction  appear grossly normal. There was no hiatal hernia.     PET 8/8/22:  1.  Distal esophageal thickening with intense hypermetabolic activity consistent with known esophageal cancer.  2.  Abnormal hypermetabolic metastatic cervical, supraclavicular, peritoneal, and retroperitoneal lymphadenopathy.    ASSESSMENT/PLAN:  Noah Morrison is a 57 year old male with:    1.  Metastatic esophageal adenocarcinoma (PDL1 CPS5, HER2 negative, JUNIOR)  -6/30/22: Esophageal biopsy: poorly differentiated adenocarcinoma with focal signet ring morphology (HER2 0+; no evidence of MSI-H).  -7/12/22: CEA 21.1.  -8/8/22: PET scan: Distal esophageal thickening with intense hypermetabolic activity consistent with known esophgeal cancer. Abnormal hypermetabolic metastatic cervical, supraclavicular, peritoneal, and retroperitoneal LAD.   -8/9-8/23/22: Concurrent chemoradiation with weekly carboplatin plus paclitaxel.  -CARIS: PDL CPS5, ERBB2 negative, MSI stable, MMR proficient, TMB low, mutations identified in AXIN1, CDKN21, and TP53.  -Started on FOLFOX plus nivolumab on 9/15/22. Last treatment on 9/29/22.  -Patient is already with port and will resume scheduling of FOLFOX plus nivolumab.  Reviewed with patient nivolumab is indicated given his CPS of 5  based on the Checkmate 649 trial. He is aware of the palliative intent of treatment.  -Patient has been tolerating treatment well and actually has had 15 pound weight gain and is no longer requiring the use of his PEG tube.  -Patient has not yet met with genetics clinic.  His father with history of esophageal cancer as well.  Unknown if this was adenocarcinoma or squamous.  Will review if patient is meeting criteria for genetics referral.  -Port and chemotherapy education provided again today in clinic. He has consented to continued treatment.     2.  Malnutrition with PEG tube placement  -No longer requiring at this time.  -He prefers to hold on nutrition consultation at this time.    3.  Chronic pain  -He is not really requiring the use of oxycodone at this time.    4.  Follow-up on 10/13/2022 with C3D1 of treatment. Scans will be due after that.       Complexity: HIGH.     Maria Alejandra Arce DO  Hematology/Oncology  [facility]

## 2022-10-04 PROBLEM — C15.9 ESOPHAGEAL ADENOCARCINOMA (H): Status: ACTIVE | Noted: 2022-01-01

## 2022-10-04 NOTE — NURSING NOTE
"Oncology Rooming Note    October 4, 2022 9:18 AM   Noah Morrison is a 57 year old male who presents for:    Chief Complaint   Patient presents with     Oncology Clinic Visit     New Patient      Initial Vitals: /73   Pulse 81   Temp 97.1  F (36.2  C) (Tympanic)   Resp 16   Ht 1.727 m (5' 8\")   Wt 66.5 kg (146 lb 8 oz)   SpO2 100%   BMI 22.28 kg/m   Estimated body mass index is 22.28 kg/m  as calculated from the following:    Height as of this encounter: 1.727 m (5' 8\").    Weight as of this encounter: 66.5 kg (146 lb 8 oz). Body surface area is 1.79 meters squared.  No Pain (0) Comment: Data Unavailable   No LMP for male patient.  Allergies reviewed: Yes  Medications reviewed: Yes    Medications: Medication refills not needed today.  Pharmacy name entered into [facility]: [id number] [address]    Clinical concerns: New Patient       Edita Manning CMA              "

## 2022-10-04 NOTE — LETTER
10/4/2022         RE: Noah Morrison  [address] [place] [address]        Dear Colleague,    Thank you for referring your patient, Noah Morrison, to the [facility]. Please see a copy of my visit note below.    [facility]    Hematology/Oncology New Patient Note      Today's Date: 10/4/22    Reason for Consultation: Esophageal cancer      HISTORY OF PRESENT ILLNESS: oNah Morrison is a 57 year old male who presents with stage IV esophageal adenocarcinoma.     Patient developed dysphagia to solids beginning in mid-May 2022 accomopanied by unintentional >35 lb weight loss. Esophagogram was performed on 6/29/22: Significant narrowing and mucosal irregularity involving the distal thoracic esophagus with partial obstruction, most likely representing an esophageal carcinoma. A significant esophagitis is an additional although less likely possibility.     EGD was performed on 6/30/22 with near obstructing mass int he thoracic esophagus approximately 40 cm from the incisors. Path returned as poorly differentiated adenocarcinoma with focal signet ring morphology.    Evaluated by med onc and rad onc with PET/CT ordered. PEG tube was placed with IR given continued weight loss.    Per record review (9/15/22- [facility]):   -Esophageal cancer, middle third:  -6/30/22: Esophageal biopsy: poorly differentiated adenocarcinoma with focal signet ring morphology (HER2 0+; no evidence of MSI-H).  -7/12/22: CEA 21.1.  -8/8/22: PET scan: Distal esophageal thickening with intense hypermetabolic activity consistent with known esophgeal cancer. Abnormal hypermetabolic metastatic cervical, supraclavicular, peritoneal, and retroperitoneal LAD.   -8/9-8/23/22: Concurrent chemoradiation with weekly carboplatin plus paclitaxel.  -CARIS: PDL CPS5, ERBB2 negative, MSI stable, MMR proficient, TMB low, mutations identified in AXIN1, CDKN21, and  TP53.  -Started on FOLFOX plus nivolumab on 9/15/22. Last treatment on 9/29/22.    Patient has had MRCP performed due to elevated liver enzymes which revealed a peripancreatic LN causing a blockage and dilation of the CBD. A metal stent was placed and LFTs improved. He continues to have weight loss and pain. He is on oxycodone.     He was using the feeding tube previously 3-4x/day, but has not used in the last 1-2 weeks. He is back to normal meals. No boost. Weight nadired down to 137 lbs and he is now 146 lbs.     He previously worked as a  at a [employer] in [place]. Relocated from [place]. He lived there for five years. He is currently living with his sister, Radha.       REVIEW OF SYSTEMS:   A 14 point ROS was reviewed with pertinent positives and negatives in the HPI.        HOME MEDICATIONS:  No current outpatient medications on file.         ALLERGIES:  NKDA.    PAST MEDICAL HISTORY:  Esophageal cancer    PAST SURGICAL HISTORY:  Tonsillectomy  Port placement  PEG tube placement    SOCIAL HISTORY:  Social History     Socioeconomic History     Marital status: Single     Spouse name: Not on file     Number of children: Not on file     Years of education: Not on file     Highest education level: Not on file   Occupational History     Not on file   Tobacco Use     Smoking status: Not on file     Smokeless tobacco: Not on file   Substance and Sexual Activity     Alcohol use: Not on file     Drug use: Not on file     Sexual activity: Not on file   Other Topics Concern     Not on file   Social History Narrative     Not on file     Social Determinants of Health     Financial Resource Strain: Not on file   Food Insecurity: Not on file   Transportation Needs: Not on file   Physical Activity: Not on file   Stress: Not on file   Social Connections: Not on file   Intimate Partner Violence: Not on file   Housing Stability: Not on file     Smoked 1 PPD starting at age 38, quit at diagnosis.     FAMILY HISTORY:  Father: 84, living,  "esophageal cancer.   Mother: 80.    PHYSICAL EXAM:  Vital signs:  /73   Pulse 81   Temp 97.1  F (36.2  C) (Tympanic)   Resp 16   Ht 1.727 m (5' 8\")   Wt 66.5 kg (146 lb 8 oz)   SpO2 100%   BMI 22.28 kg/m     ECO-1  GENERAL/CONSTITUTIONAL: No acute distress.  EYES: Pupils are equal, round, and react to light and accommodation. Extraocular movements intact.  No scleral icterus.  ENT/MOUTH: Neck supple. Oropharynx clear, no mucositis.  LYMPH: No anterior cervical, posterior cervical, supraclavicular, axillary or inguinal adenopathy.   RESPIRATORY: Clear to auscultation bilaterally. No crackles or wheezing.   CARDIOVASCULAR: Regular rate and rhythm without murmurs, gallops, or rubs.  GASTROINTESTINAL: No hepatosplenomegaly, masses, or tenderness. The patient has normal bowel sounds. No guarding.  No distention.  MUSCULOSKELETAL: Warm and well-perfused, no cyanosis, clubbing, or edema.  NEUROLOGIC: Cranial nerves II-XII are intact. Alert, oriented, answers questions appropriately.  INTEGUMENTARY: No rashes or jaundice.  GAIT: Steady, does not use assistive device      LABS:  9/15/22: WBC 6.10, Hb 13.7, hematocrit 40.5, MCV 89.6, .     Na 137, K 4.5, Cl 101, CO2 25, Cr 1.11, total protein 7.7, albumin 3.1. T. Bili 0.9, Alk phos 323, AST 13, ALT 90.      PATHOLOGY:  EGD 22:  Procedural Details/Findings:   After informed consent the patient was brought to the endoscopy suite. IV sedation was administered. The endoscope was advanced under direct vision until the mass in mid esophagus was encountered. The mass was approximately 40 cm from the incisors. Multiple biopsies were obtained of this mass with the cold biopsy forceps. The area was narrow enough that we could not pass the scope through it.     There where no complications.    Findings:  Nearly obstructing mass in the thoracic esophagus. It appeared to be neoplastic in nature.     PATHOLOGY - 2022 1:58 PM MDT    CASE: " "[id number]  PATIENT: DEBO GRAVES    SPECIMENS SUBMITTED:  A. ESOPHAGEAL MASS BX      DIAGNOSIS:  A.   ESOPHAGEAL MASS, ENDOSCOPIC BIOPSY:      -    POSITIVE FOR POORLY-DIFFERENTIATED ADENOCARCINOMA WITH FOCAL  SIGNET RING MORPHOLOGY (SEE NOTE)    NOTE:  The IHC stains were performed and showed malignant cells to be positive for  CK7(+) and negative for CD-45(-),  CD-30(-), CK5/p-40(-), Melanoma cocktail(-). The tissue in the block A1 is  available for additional molecular  testing as clinically indicated. Correlation with clinical history and  radiology images is recommended.      IMMUNOHISTOCHEMICAL STUDIES:  TISSUE BLOCK:  A1  HER2 PROTEIN EXPRESSION (0-3+):    0+  TECHNICAL COMMENT:  Following collection, the tissue sample was promptly (< 60 minutes) placed  in 10% neutral buffered formalin. Fixation was for 6-72 hours, followed by  paraffin embedding.  A polymer-based immunohistochemical analysis of estrogen/progesterone  receptor and Her2 protein expression (clones SP1, 1E2 and 4B5,  respectively) was performed. The steroid receptor result is positive if at  least 1% of tumor cell nuclei show specific staining. For positive cases  the \"fraction of cells staining\" is a visual estimate subject to observer  variation. For Her2, membrane positivity was scored according to the 2014  ASCO/CAP consensus (Arch Pathol Lab Med 138:241-56, 2014): 0-1+ is negative  for overexpression, 2+ is equivocal, and 3+ is positive.      MISMATCH REPAIR PROTEIN EXPRESSION  MLH-1:    Positive (protein expressed)  PMS-2:    Positive (protein expressed)  MSH-2:    Positive (protein expressed)  MSH-6:    Positive (protein expressed)  Comment:  No evidence of MSI-high.    CARIS:  PD-L1 CPS 5, positive.  ERBB2 HER2/silvina negative.    IMAGIN2022 11:14 AM MDT    IMPRESSION: Significant narrowing and mucosal irregularity involving  the distal thoracic esophagus with partial obstruction, most likely  representing an esophageal " carcinoma. A significant esophagitis is an  additional although less likely possibility. Direct visualization  will be necessary.       Imaging Results - FL esophagram complete (06/29/2022 10:09 AM MDT)  Narrative   06/29/2022 11:14 AM MDT    CLINICAL INDICATION:  dysphagia  TC  .30 ISIDRO FLUORO, 12 mGY    EXAM:  ESOPHAGRAM BARIUM SWALLOW      COMPARISON:  None    FINDINGS:    Approximately 30 seconds of fluoroscopy time were used and 12 images  obtained.    Barium and air were administered orally under fluoroscopic guidance.  The pharynx and cervical esophagus were not studied due to a partial  obstruction in the distal thoracic esophagus which did not allow  further administration of barium.    The distal fourth of the thoracic esophagus was grossly abnormal with  significant mucosal irregularity and narrowing with abrupt transition  and moderate proximal esophageal distention and air-fluid level,  consistent with partial obstruction. The visualized GE junction  appear grossly normal. There was no hiatal hernia.     PET 8/8/22:  1.  Distal esophageal thickening with intense hypermetabolic activity consistent with known esophageal cancer.  2.  Abnormal hypermetabolic metastatic cervical, supraclavicular, peritoneal, and retroperitoneal lymphadenopathy.    ASSESSMENT/PLAN:  Noah Morrison is a 57 year old male with:    1.  Metastatic esophageal adenocarcinoma (PDL1 CPS5, HER2 negative, JUNIOR)  -6/30/22: Esophageal biopsy: poorly differentiated adenocarcinoma with focal signet ring morphology (HER2 0+; no evidence of MSI-H).  -7/12/22: CEA 21.1.  -8/8/22: PET scan: Distal esophageal thickening with intense hypermetabolic activity consistent with known esophgeal cancer. Abnormal hypermetabolic metastatic cervical, supraclavicular, peritoneal, and retroperitoneal LAD.   -8/9-8/23/22: Concurrent chemoradiation with weekly carboplatin plus paclitaxel.  -CARIS: PDL CPS5, ERBB2 negative, MSI stable, MMR proficient, TMB  low, mutations identified in AXIN1, CDKN21, and TP53.  -Started on FOLFOX plus nivolumab on 9/15/22. Last treatment on 9/29/22.  -Patient is already with port and will resume scheduling of FOLFOX plus nivolumab.  Reviewed with patient nivolumab is indicated given his CPS of 5 based on the Checkmate 649 trial. He is aware of the palliative intent of treatment.  -Patient has been tolerating treatment well and actually has had 15 pound weight gain and is no longer requiring the use of his PEG tube.  -Patient has not yet met with genetics clinic.  His father with history of esophageal cancer as well.  Unknown if this was adenocarcinoma or squamous.  Will review if patient is meeting criteria for genetics referral.  -Port and chemotherapy education provided again today in clinic. He has consented to continued treatment.     2.  Malnutrition with PEG tube placement  -No longer requiring at this time.  -He prefers to hold on nutrition consultation at this time.    3.  Chronic pain  -He is not really requiring the use of oxycodone at this time.    4.  Follow-up on 10/13/2022 with C3D1 of treatment. Scans will be due after that.       Complexity: HIGH.     Maria Alejandra Arce DO  Hematology/Oncology  [facility]        Again, thank you for allowing me to participate in the care of your patient.        Sincerely,        Maria Alejandra Arce DO

## 2022-10-04 NOTE — PROGRESS NOTES
Medical Assistant Note:  Noah Morrison presents today for blood draw.    Patient seen by provider today: Yes: Dr. Arce.   present during visit today: Not Applicable.    Concerns: No Concerns.    Procedure:  Labs drawn    Post Assessment:  Labs drawn without difficulty: Yes.    Discharge Plan:  Departure Mode: Ambulatory.    Face to Face Time: 10 min.    Marcy Piedra CMA

## 2022-10-05 NOTE — PROGRESS NOTES
[facility]: Cancer Care Plan of Care Education Note                                    Discussion with Patient:                                                    Writer met with Edilberto and his sister after an appointment with Dr. Arce. Edilberto reports already starting this chemotherapy regimen with another institution. Writer reviewed education material in detail. Edilberto has a port in place.    Assessment:                                                      Assessment completed with:: Patient (sister)    Current living arrangement  I live in a private home with family    Plan of Care Education   Yearly learning assessment completed?: Yes (see Education tab)  Diagnosis:: (P) Esophageal Adenocarcinoma  Does patient understand diagnosis?: (P) Yes  Tx plan/regimen:: (P) FOLFOX + Nivolumab  Does patient understand treatment plan/regimen?: (P) Yes  Preparing for treatment:: (P) Reviewed treatment preparation information with patient (vascular access, day of chemo, visitor policy, what to bring, etc.)  Vascular access:: (P) Port  Side effect education:: (P) Infertility;Skin changes;Urinary;Lab value monitoring (anemia, neutropenia, thrombocytopenia);Diarrhea/Constipation;Endocrine therapy (myalgias, arthralgias, hot flashes, vaginal dryness, mood disorder, and thinning of the bones);Mouth sores;Mylosuppression;Nausea/Vomiting;Fatigue;Neuropathy;Hair loss;Sexual health;Infection  Supportive services:: (P) Nutrition  Transportation means:: (P) Family  Informal Support system:: (P) Family  Plan of Care:: (P) Lab appointment;MD follow-up appointment;Treatment schedule  When to call provider:: (P) Bleeding;Increased shortness of breath;New/worsening pain;Shaking chills;Temperature >100.4F;Uncontrolled diarrhea/constipation;Uncontrolled nausea/vomiting  Reasons for deferring treatment reviewed with patient:: (P) Yes    Evaluation of Learning  Patient Education Provided: (P) Yes  Readiness:: (P) Acceptance  Method:: (P)  Booklet/Handout;Literature;Explanation  Response:: (P) Verbalizes understanding    Intervention/Education provided during outreach:                                                     Writer discussed Oxaliplatin, Leucovorin, 5FU, and Nivolumab in detail with Edilberto and his sister Radha. Write reviewed potential side effects, self care at home, and when to contact the doctor. [facility] education handouts given on all of these medications. Writer also discussed day of chemotherapy education and visitor policy. Writer discussed RNCC and triage team roles and gave a tour of the infusion center. Edilberto and Radha's questions were answered to their satisfaction and they have no further questions at this time.     Signature:  Louise Willis RN

## 2022-10-11 NOTE — PROGRESS NOTES
[facility]    Hematology/Oncology Established Patient Note      Today's Date: 10/12/22    Reason for Consultation: Esophageal cancer      HISTORY OF PRESENT ILLNESS: Noah Morrison is a 57 year old male who presents with stage IV esophageal adenocarcinoma.     Patient developed dysphagia to solids beginning in mid-May 2022 accomopanied by unintentional >35 lb weight loss. Esophagogram was performed on 6/29/22: Significant narrowing and mucosal irregularity involving the distal thoracic esophagus with partial obstruction, most likely representing an esophageal carcinoma. A significant esophagitis is an additional although less likely possibility.     EGD was performed on 6/30/22 with near obstructing mass int he thoracic esophagus approximately 40 cm from the incisors. Path returned as poorly differentiated adenocarcinoma with focal signet ring morphology.    Evaluated by med onc and rad onc with PET/CT ordered. PEG tube was placed with IR given continued weight loss.    Per record review (9/15/22- [facility]):   -Esophageal cancer, middle third:  -6/30/22: Esophageal biopsy: poorly differentiated adenocarcinoma with focal signet ring morphology (HER2 0+; no evidence of MSI-H).  -7/12/22: CEA 21.1.  -8/8/22: PET scan: Distal esophageal thickening with intense hypermetabolic activity consistent with known esophgeal cancer. Abnormal hypermetabolic metastatic cervical, supraclavicular, peritoneal, and retroperitoneal LAD.   -8/9-8/23/22: Concurrent chemoradiation with weekly carboplatin plus paclitaxel.  -CARIS: PDL CPS5, ERBB2 negative, MSI stable, MMR proficient, TMB low, mutations identified in AXIN1, CDKN21, and TP53.  -Started on FOLFOX plus nivolumab on 9/15/22. Last treatment on 9/29/22.    Patient has had MRCP performed due to elevated liver enzymes which revealed a peripancreatic LN causing a blockage and dilation of the CBD. A metal stent was placed and  LFTs improved. He continues to have weight loss and pain. He is on oxycodone.     He was using the feeding tube previously 3-4x/day, but has not used in the last 1-2 weeks. He is back to normal meals. No boost. Weight nadired down to 137 lbs and he is now 146 lbs.     He previously worked as a  at a hotel in [place]. Relocated from [place]. He lived there for five years. He is currently living with his sister, Radha.       INTERIM HISTORY:        REVIEW OF SYSTEMS:   A 14 point ROS was reviewed with pertinent positives and negatives in the HPI.        HOME MEDICATIONS:  Current Outpatient Medications   Medication Sig Dispense Refill     acetaminophen (TYLENOL) 325 MG tablet Take 650 mg by mouth as needed       OXYCODONE HCL PO Take 20 mg by mouth every 6 hours as needed           ALLERGIES:  NKDA.    PAST MEDICAL HISTORY:  Esophageal cancer    PAST SURGICAL HISTORY:  Tonsillectomy  Port placement  PEG tube placement    SOCIAL HISTORY:  Social History     Socioeconomic History     Marital status: Single     Spouse name: Not on file     Number of children: Not on file     Years of education: Not on file     Highest education level: Not on file   Occupational History     Not on file   Tobacco Use     Smoking status: Former     Smokeless tobacco: Never   Substance and Sexual Activity     Alcohol use: Yes     Drug use: Yes     Types: Marijuana     Sexual activity: Not on file   Other Topics Concern     Not on file   Social History Narrative     Not on file     Social Determinants of Health     Financial Resource Strain: Not on file   Food Insecurity: Not on file   Transportation Needs: Not on file   Physical Activity: Not on file   Stress: Not on file   Social Connections: Not on file   Intimate Partner Violence: Not on file   Housing Stability: Not on file     Smoked 1 PPD starting at age 38, quit at diagnosis.     FAMILY HISTORY:  Father: 84, living, esophageal cancer.   Mother: 80.    PHYSICAL EXAM:  Vital signs:  BP 91/62  "  Pulse 91   Temp 97.3  F (36.3  C) (Tympanic)   Resp 16   Ht 1.727 m (5' 8\")   Wt 66.7 kg (147 lb)   SpO2 99%   BMI 22.35 kg/m     ECO-1  GENERAL/CONSTITUTIONAL: No acute distress.  EYES: Pupils are equal, round, and react to light and accommodation. Extraocular movements intact.  No scleral icterus.  ENT/MOUTH: Neck supple. Oropharynx clear, no mucositis.  LYMPH: No anterior cervical, posterior cervical, supraclavicular, axillary or inguinal adenopathy.   RESPIRATORY: Clear to auscultation bilaterally. No crackles or wheezing.   CARDIOVASCULAR: Regular rate and rhythm without murmurs, gallops, or rubs.  GASTROINTESTINAL: No hepatosplenomegaly, masses, or tenderness. The patient has normal bowel sounds. No guarding.  No distention.  MUSCULOSKELETAL: Warm and well-perfused, no cyanosis, clubbing, or edema.  NEUROLOGIC: Cranial nerves II-XII are intact. Alert, oriented, answers questions appropriately.  INTEGUMENTARY: No rashes or jaundice.  GAIT: Steady, does not use assistive device      LABS:   Latest Reference Range & Units 10/12/22 07:35   Sodium 136 - 145 mmol/L 140   Potassium 3.4 - 5.3 mmol/L 3.8   Chloride 98 - 107 mmol/L 105   Carbon Dioxide (CO2) 22 - 29 mmol/L 25   Urea Nitrogen 8.0 - 23.0 mg/dL 10.8   Creatinine 0.67 - 1.17 mg/dL 0.75   GFR Estimate >60 mL/min/1.73m2 >90   Calcium 8.6 - 10.0 mg/dL 8.6   Anion Gap 7 - 15 mmol/L 10   Albumin 3.5 - 5.2 g/dL 3.2 (L)   Protein Total 6.4 - 8.3 g/dL 5.8 (L)   Alkaline Phosphatase 40 - 129 U/L 98   ALT 10 - 50 U/L 37   AST 10 - 50 U/L 11   Bilirubin Total <=1.2 mg/dL 0.4   Glucose 70 - 99 mg/dL 112 (H)   TSH 0.30 - 4.20 uIU/mL 3.27   WBC 4.0 - 11.0 10e3/uL 3.0 (L)   Hemoglobin 13.3 - 17.7 g/dL 10.5 (L)   Hematocrit 40.0 - 53.0 % 34.0 (L)   Platelet Count 150 - 450 10e3/uL 162   RBC Count 4.40 - 5.90 10e6/uL 3.50 (L)   MCV 78 - 100 fL 97   MCH 26.5 - 33.0 pg 30.0   MCHC 31.5 - 36.5 g/dL 30.9 (L)   RDW 10.0 - 15.0 % 19.6 (H)   % Neutrophils % 47   % " Lymphocytes % 27   % Monocytes % 23   % Eosinophils % 1   % Basophils % 1   Absolute Basophils 0.0 - 0.2 10e3/uL 0.0   Absolute Eosinophils 0.0 - 0.7 10e3/uL 0.0   Absolute Immature Granulocytes <=0.4 10e3/uL 0.0   Absolute Lymphocytes 0.8 - 5.3 10e3/uL 0.8   Absolute Monocytes 0.0 - 1.3 10e3/uL 0.7   % Immature Granulocytes % 1   Absolute Neutrophils 1.6 - 8.3 10e3/uL 1.4 (L)   Absolute NRBCs 10e3/uL 0.0   NRBCs per 100 WBC <1 /100 0       PATHOLOGY:  EGD 6/30/22:  Procedural Details/Findings:   After informed consent the patient was brought to the endoscopy suite. IV sedation was administered. The endoscope was advanced under direct vision until the mass in mid esophagus was encountered. The mass was approximately 40 cm from the incisors. Multiple biopsies were obtained of this mass with the cold biopsy forceps. The area was narrow enough that we could not pass the scope through it.     There where no complications.    Findings:  Nearly obstructing mass in the thoracic esophagus. It appeared to be neoplastic in nature.     PATHOLOGY - 07/08/2022 1:58 PM MDT    CASE: [id number]  PATIENT: DEOB GRAVES    SPECIMENS SUBMITTED:  A. ESOPHAGEAL MASS BX      DIAGNOSIS:  A.   ESOPHAGEAL MASS, ENDOSCOPIC BIOPSY:      -    POSITIVE FOR POORLY-DIFFERENTIATED ADENOCARCINOMA WITH FOCAL  SIGNET RING MORPHOLOGY (SEE NOTE)    NOTE:  The IHC stains were performed and showed malignant cells to be positive for  CK7(+) and negative for CD-45(-),  CD-30(-), CK5/p-40(-), Melanoma cocktail(-). The tissue in the block A1 is  available for additional molecular  testing as clinically indicated. Correlation with clinical history and  radiology images is recommended.      IMMUNOHISTOCHEMICAL STUDIES:  TISSUE BLOCK:  A1  HER2 PROTEIN EXPRESSION (0-3+):    0+  TECHNICAL COMMENT:  Following collection, the tissue sample was promptly (< 60 minutes) placed  in 10% neutral buffered formalin. Fixation was for 6-72 hours, followed by  paraffin  "embedding.  A polymer-based immunohistochemical analysis of estrogen/progesterone  receptor and Her2 protein expression (clones SP1, 1E2 and 4B5,  respectively) was performed. The steroid receptor result is positive if at  least 1% of tumor cell nuclei show specific staining. For positive cases  the \"fraction of cells staining\" is a visual estimate subject to observer  variation. For Her2, membrane positivity was scored according to the 2014  ASCO/CAP consensus (Arch Pathol Lab Med 138:241-56, 2014): 0-1+ is negative  for overexpression, 2+ is equivocal, and 3+ is positive.      MISMATCH REPAIR PROTEIN EXPRESSION  MLH-1:    Positive (protein expressed)  PMS-2:    Positive (protein expressed)  MSH-2:    Positive (protein expressed)  MSH-6:    Positive (protein expressed)  Comment:  No evidence of MSI-high.    CARIS:  PD-L1 CPS 5, positive.  ERBB2 HER2/silvina negative.      IMAGIN2022 11:14 AM MDT    IMPRESSION: Significant narrowing and mucosal irregularity involving  the distal thoracic esophagus with partial obstruction, most likely  representing an esophageal carcinoma. A significant esophagitis is an  additional although less likely possibility. Direct visualization  will be necessary.       Imaging Results - FL esophagram complete (2022 10:09 AM MDT)  Narrative   2022 11:14 AM MDT    CLINICAL INDICATION:  dysphagia  TC  .30 ISIDRO FLUORO, 12 mGY    EXAM:  ESOPHAGRAM BARIUM SWALLOW      COMPARISON:  None    FINDINGS:    Approximately 30 seconds of fluoroscopy time were used and 12 images  obtained.    Barium and air were administered orally under fluoroscopic guidance.  The pharynx and cervical esophagus were not studied due to a partial  obstruction in the distal thoracic esophagus which did not allow  further administration of barium.    The distal fourth of the thoracic esophagus was grossly abnormal with  significant mucosal irregularity and narrowing with abrupt transition  and moderate " proximal esophageal distention and air-fluid level,  consistent with partial obstruction. The visualized GE junction  appear grossly normal. There was no hiatal hernia.     PET 8/8/22:  1.  Distal esophageal thickening with intense hypermetabolic activity consistent with known esophageal cancer.  2.  Abnormal hypermetabolic metastatic cervical, supraclavicular, peritoneal, and retroperitoneal lymphadenopathy.        ASSESSMENT/PLAN:  Noah Morrison is a 57 year old male with:    1.  Metastatic esophageal adenocarcinoma (PDL1 CPS5, HER2 negative, JUNIOR)  -6/30/22: Esophageal biopsy: poorly differentiated adenocarcinoma with focal signet ring morphology (HER2 0+; no evidence of MSI-H).  -7/12/22: CEA 21.1.  -8/8/22: PET scan: Distal esophageal thickening with intense hypermetabolic activity consistent with known esophgeal cancer. Abnormal hypermetabolic metastatic cervical, supraclavicular, peritoneal, and retroperitoneal LAD.   -8/9-8/23/22: Concurrent chemoradiation with weekly carboplatin plus paclitaxel.  -CARIS: PDL CPS5, ERBB2 negative, MSI stable, MMR proficient, TMB low, mutations identified in AXIN1, CDKN21, and TP53.  -Started on FOLFOX plus nivolumab on 9/15/22. Last treatment on 9/29/22.  -Patient is already with port and will resume scheduling of FOLFOX plus nivolumab.  Reviewed with patient nivolumab is indicated given his CPS of 5 based on the Checkmate 649 trial. He is aware of the palliative intent of treatment.  -Patient has been tolerating treatment well and actually has had 15 pound weight gain and is no longer requiring the use of his PEG tube.  -Port and chemotherapy education provided again today in clinic. He has consented to continued treatment.   -Genetics referral given family history.    2.  Malnutrition with PEG tube placement  -No longer requiring at this time.  -Nutrition referral.    3.  Chronic pain  -He is not really requiring the use of oxycodone at this time.    4.  Labs reviewed.  Okay for treatment 10/13/22. Follow up with JESSICA in 2 weeks. Alternate 2 week follow up between JESSICA and myself. CT imaging already scheduled for next week on 10/19/22.        Maria Alejandra Arce DO  Hematology/Oncology  [facility]

## 2022-10-12 NOTE — PROGRESS NOTES
Zofran script sent to [facility] in [place], [place]     Louise Willis RN on 10/12/2022 at 1:47 PM

## 2022-10-12 NOTE — PROGRESS NOTES
Edilberto returned call to writer. He reports having Compazine and Dexamethasone at home, but does not have Zofran. Writer will request medication script from Dr. Arce. Writer also reviewed chemotherapy appointment tomorrow with both Edilberto and his sister Radha. They have no further questions at this time.     Louise Willis RN on 10/12/2022 at 11:09 AM

## 2022-10-12 NOTE — LETTER
10/12/2022         RE: Noah Morrison  [address] [place] [address]        Dear Colleague,    Thank you for referring your patient, Noah Morrison, to the [facility]. Please see a copy of my visit note below.    [facility]    Hematology/Oncology Established Patient Note      Today's Date: 10/12/22    Reason for Consultation: Esophageal cancer      HISTORY OF PRESENT ILLNESS: Noah Morrison is a 57 year old male who presents with stage IV esophageal adenocarcinoma.     Patient developed dysphagia to solids beginning in mid-May 2022 accomopanied by unintentional >35 lb weight loss. Esophagogram was performed on 6/29/22: Significant narrowing and mucosal irregularity involving the distal thoracic esophagus with partial obstruction, most likely representing an esophageal carcinoma. A significant esophagitis is an additional although less likely possibility.     EGD was performed on 6/30/22 with near obstructing mass int he thoracic esophagus approximately 40 cm from the incisors. Path returned as poorly differentiated adenocarcinoma with focal signet ring morphology.    Evaluated by med onc and rad onc with PET/CT ordered. PEG tube was placed with IR given continued weight loss.    Per record review (9/15/22- [facility]):   -Esophageal cancer, middle third:  -6/30/22: Esophageal biopsy: poorly differentiated adenocarcinoma with focal signet ring morphology (HER2 0+; no evidence of MSI-H).  -7/12/22: CEA 21.1.  -8/8/22: PET scan: Distal esophageal thickening with intense hypermetabolic activity consistent with known esophgeal cancer. Abnormal hypermetabolic metastatic cervical, supraclavicular, peritoneal, and retroperitoneal LAD.   -8/9-8/23/22: Concurrent chemoradiation with weekly carboplatin plus paclitaxel.  -CARIS: PDL CPS5, ERBB2 negative, MSI stable, MMR proficient, TMB low, mutations identified in AXIN1,  CDKN21, and TP53.  -Started on FOLFOX plus nivolumab on 9/15/22. Last treatment on 9/29/22.    Patient has had MRCP performed due to elevated liver enzymes which revealed a peripancreatic LN causing a blockage and dilation of the CBD. A metal stent was placed and LFTs improved. He continues to have weight loss and pain. He is on oxycodone.     He was using the feeding tube previously 3-4x/day, but has not used in the last 1-2 weeks. He is back to normal meals. No boost. Weight nadired down to 137 lbs and he is now 146 lbs.     He previously worked as a  at a [employer] in [place]. Relocated from [place]. He lived there for five years. He is currently living with his sister, Radha.       INTERIM HISTORY:        REVIEW OF SYSTEMS:   A 14 point ROS was reviewed with pertinent positives and negatives in the HPI.        HOME MEDICATIONS:  Current Outpatient Medications   Medication Sig Dispense Refill     acetaminophen (TYLENOL) 325 MG tablet Take 650 mg by mouth as needed       OXYCODONE HCL PO Take 20 mg by mouth every 6 hours as needed           ALLERGIES:  NKDA.    PAST MEDICAL HISTORY:  Esophageal cancer    PAST SURGICAL HISTORY:  Tonsillectomy  Port placement  PEG tube placement    SOCIAL HISTORY:  Social History     Socioeconomic History     Marital status: Single     Spouse name: Not on file     Number of children: Not on file     Years of education: Not on file     Highest education level: Not on file   Occupational History     Not on file   Tobacco Use     Smoking status: Former     Smokeless tobacco: Never   Substance and Sexual Activity     Alcohol use: Yes     Drug use: Yes     Types: Marijuana     Sexual activity: Not on file   Other Topics Concern     Not on file   Social History Narrative     Not on file     Social Determinants of Health     Financial Resource Strain: Not on file   Food Insecurity: Not on file   Transportation Needs: Not on file   Physical Activity: Not on file   Stress: Not on file   Social  "Connections: Not on file   Intimate Partner Violence: Not on file   Housing Stability: Not on file     Smoked 1 PPD starting at age 38, quit at diagnosis.     FAMILY HISTORY:  Father: 84, living, esophageal cancer.   Mother: 80.    PHYSICAL EXAM:  Vital signs:  BP 91/62   Pulse 91   Temp 97.3  F (36.3  C) (Tympanic)   Resp 16   Ht 1.727 m (5' 8\")   Wt 66.7 kg (147 lb)   SpO2 99%   BMI 22.35 kg/m     ECO-1  GENERAL/CONSTITUTIONAL: No acute distress.  EYES: Pupils are equal, round, and react to light and accommodation. Extraocular movements intact.  No scleral icterus.  ENT/MOUTH: Neck supple. Oropharynx clear, no mucositis.  LYMPH: No anterior cervical, posterior cervical, supraclavicular, axillary or inguinal adenopathy.   RESPIRATORY: Clear to auscultation bilaterally. No crackles or wheezing.   CARDIOVASCULAR: Regular rate and rhythm without murmurs, gallops, or rubs.  GASTROINTESTINAL: No hepatosplenomegaly, masses, or tenderness. The patient has normal bowel sounds. No guarding.  No distention.  MUSCULOSKELETAL: Warm and well-perfused, no cyanosis, clubbing, or edema.  NEUROLOGIC: Cranial nerves II-XII are intact. Alert, oriented, answers questions appropriately.  INTEGUMENTARY: No rashes or jaundice.  GAIT: Steady, does not use assistive device      LABS:   Latest Reference Range & Units 10/12/22 07:35   Sodium 136 - 145 mmol/L 140   Potassium 3.4 - 5.3 mmol/L 3.8   Chloride 98 - 107 mmol/L 105   Carbon Dioxide (CO2) 22 - 29 mmol/L 25   Urea Nitrogen 8.0 - 23.0 mg/dL 10.8   Creatinine 0.67 - 1.17 mg/dL 0.75   GFR Estimate >60 mL/min/1.73m2 >90   Calcium 8.6 - 10.0 mg/dL 8.6   Anion Gap 7 - 15 mmol/L 10   Albumin 3.5 - 5.2 g/dL 3.2 (L)   Protein Total 6.4 - 8.3 g/dL 5.8 (L)   Alkaline Phosphatase 40 - 129 U/L 98   ALT 10 - 50 U/L 37   AST 10 - 50 U/L 11   Bilirubin Total <=1.2 mg/dL 0.4   Glucose 70 - 99 mg/dL 112 (H)   TSH 0.30 - 4.20 uIU/mL 3.27   WBC 4.0 - 11.0 10e3/uL 3.0 (L)   Hemoglobin 13.3 - " 17.7 g/dL 10.5 (L)   Hematocrit 40.0 - 53.0 % 34.0 (L)   Platelet Count 150 - 450 10e3/uL 162   RBC Count 4.40 - 5.90 10e6/uL 3.50 (L)   MCV 78 - 100 fL 97   MCH 26.5 - 33.0 pg 30.0   MCHC 31.5 - 36.5 g/dL 30.9 (L)   RDW 10.0 - 15.0 % 19.6 (H)   % Neutrophils % 47   % Lymphocytes % 27   % Monocytes % 23   % Eosinophils % 1   % Basophils % 1   Absolute Basophils 0.0 - 0.2 10e3/uL 0.0   Absolute Eosinophils 0.0 - 0.7 10e3/uL 0.0   Absolute Immature Granulocytes <=0.4 10e3/uL 0.0   Absolute Lymphocytes 0.8 - 5.3 10e3/uL 0.8   Absolute Monocytes 0.0 - 1.3 10e3/uL 0.7   % Immature Granulocytes % 1   Absolute Neutrophils 1.6 - 8.3 10e3/uL 1.4 (L)   Absolute NRBCs 10e3/uL 0.0   NRBCs per 100 WBC <1 /100 0       PATHOLOGY:  EGD 6/30/22:  Procedural Details/Findings:   After informed consent the patient was brought to the endoscopy suite. IV sedation was administered. The endoscope was advanced under direct vision until the mass in mid esophagus was encountered. The mass was approximately 40 cm from the incisors. Multiple biopsies were obtained of this mass with the cold biopsy forceps. The area was narrow enough that we could not pass the scope through it.     There where no complications.    Findings:  Nearly obstructing mass in the thoracic esophagus. It appeared to be neoplastic in nature.     PATHOLOGY - 07/08/2022 1:58 PM MDT    CASE: [id number]  PATIENT: DEBO GRAVES    SPECIMENS SUBMITTED:  A. ESOPHAGEAL MASS BX      DIAGNOSIS:  A.   ESOPHAGEAL MASS, ENDOSCOPIC BIOPSY:      -    POSITIVE FOR POORLY-DIFFERENTIATED ADENOCARCINOMA WITH FOCAL  SIGNET RING MORPHOLOGY (SEE NOTE)    NOTE:  The IHC stains were performed and showed malignant cells to be positive for  CK7(+) and negative for CD-45(-),  CD-30(-), CK5/p-40(-), Melanoma cocktail(-). The tissue in the block A1 is  available for additional molecular  testing as clinically indicated. Correlation with clinical history and  radiology images is  "recommended.      IMMUNOHISTOCHEMICAL STUDIES:  TISSUE BLOCK:  A1  HER2 PROTEIN EXPRESSION (0-3+):    0+  TECHNICAL COMMENT:  Following collection, the tissue sample was promptly (< 60 minutes) placed  in 10% neutral buffered formalin. Fixation was for 6-72 hours, followed by  paraffin embedding.  A polymer-based immunohistochemical analysis of estrogen/progesterone  receptor and Her2 protein expression (clones SP1, 1E2 and 4B5,  respectively) was performed. The steroid receptor result is positive if at  least 1% of tumor cell nuclei show specific staining. For positive cases  the \"fraction of cells staining\" is a visual estimate subject to observer  variation. For Her2, membrane positivity was scored according to the 2014  ASCO/CAP consensus (Arch Pathol Lab Med 138:241-56, 2014): 0-1+ is negative  for overexpression, 2+ is equivocal, and 3+ is positive.      MISMATCH REPAIR PROTEIN EXPRESSION  MLH-1:    Positive (protein expressed)  PMS-2:    Positive (protein expressed)  MSH-2:    Positive (protein expressed)  MSH-6:    Positive (protein expressed)  Comment:  No evidence of MSI-high.    CARIS:  PD-L1 CPS 5, positive.  ERBB2 HER2/silvina negative.      IMAGIN2022 11:14 AM MDT    IMPRESSION: Significant narrowing and mucosal irregularity involving  the distal thoracic esophagus with partial obstruction, most likely  representing an esophageal carcinoma. A significant esophagitis is an  additional although less likely possibility. Direct visualization  will be necessary.       Imaging Results - FL esophagram complete (2022 10:09 AM MDT)  Narrative   2022 11:14 AM MDT    CLINICAL INDICATION:  dysphagia  TC  .30 ISIDRO FLUORO, 12 mGY    EXAM:  ESOPHAGRAM BARIUM SWALLOW      COMPARISON:  None    FINDINGS:    Approximately 30 seconds of fluoroscopy time were used and 12 images  obtained.    Barium and air were administered orally under fluoroscopic guidance.  The pharynx and cervical esophagus were " not studied due to a partial  obstruction in the distal thoracic esophagus which did not allow  further administration of barium.    The distal fourth of the thoracic esophagus was grossly abnormal with  significant mucosal irregularity and narrowing with abrupt transition  and moderate proximal esophageal distention and air-fluid level,  consistent with partial obstruction. The visualized GE junction  appear grossly normal. There was no hiatal hernia.     PET 8/8/22:  1.  Distal esophageal thickening with intense hypermetabolic activity consistent with known esophageal cancer.  2.  Abnormal hypermetabolic metastatic cervical, supraclavicular, peritoneal, and retroperitoneal lymphadenopathy.        ASSESSMENT/PLAN:  Noah Morrison is a 57 year old male with:    1.  Metastatic esophageal adenocarcinoma (PDL1 CPS5, HER2 negative, JUNIOR)  -6/30/22: Esophageal biopsy: poorly differentiated adenocarcinoma with focal signet ring morphology (HER2 0+; no evidence of MSI-H).  -7/12/22: CEA 21.1.  -8/8/22: PET scan: Distal esophageal thickening with intense hypermetabolic activity consistent with known esophgeal cancer. Abnormal hypermetabolic metastatic cervical, supraclavicular, peritoneal, and retroperitoneal LAD.   -8/9-8/23/22: Concurrent chemoradiation with weekly carboplatin plus paclitaxel.  -CARIS: PDL CPS5, ERBB2 negative, MSI stable, MMR proficient, TMB low, mutations identified in AXIN1, CDKN21, and TP53.  -Started on FOLFOX plus nivolumab on 9/15/22. Last treatment on 9/29/22.  -Patient is already with port and will resume scheduling of FOLFOX plus nivolumab.  Reviewed with patient nivolumab is indicated given his CPS of 5 based on the Checkmate 649 trial. He is aware of the palliative intent of treatment.  -Patient has been tolerating treatment well and actually has had 15 pound weight gain and is no longer requiring the use of his PEG tube.  -Port and chemotherapy education provided again today in clinic. He has  consented to continued treatment.   -Genetics referral given family history.    2.  Malnutrition with PEG tube placement  -No longer requiring at this time.  -Nutrition referral.    3.  Chronic pain  -He is not really requiring the use of oxycodone at this time.    4.  Labs reviewed. Okay for treatment 10/13/22. Follow up with JESSICA in 2 weeks. Alternate 2 week follow up between JESSICA and myself. CT imaging already scheduled for next week on 10/19/22.        Maria Alejandra Arce DO  Hematology/Oncology  [facility]        Again, thank you for allowing me to participate in the care of your patient.        Sincerely,        Maria Alejandra Arce DO

## 2022-10-12 NOTE — NURSING NOTE
"Oncology Rooming Note    October 12, 2022 8:05 AM   Noah Morrison is a 57 year old male who presents for:    Chief Complaint   Patient presents with     Oncology Clinic Visit     Esophageal adenocarcinoma     Initial Vitals: BP 91/62   Pulse 91   Temp 97.3  F (36.3  C) (Tympanic)   Resp 16   Ht 1.727 m (5' 8\")   Wt 66.7 kg (147 lb)   SpO2 99%   BMI 22.35 kg/m   Estimated body mass index is 22.35 kg/m  as calculated from the following:    Height as of this encounter: 1.727 m (5' 8\").    Weight as of this encounter: 66.7 kg (147 lb). Body surface area is 1.79 meters squared.  No Pain (0) Comment: Data Unavailable   No LMP for male patient.  Allergies reviewed: Yes  Medications reviewed: Yes    Medications: Medication refills not needed today.  Pharmacy name entered into [facility]: [id number] [place], [place] - [address]      Marcy Piedra CMA              "

## 2022-10-12 NOTE — PROGRESS NOTES
Writer called Edilberto to discuss D1C1 of chemotherapy that is scheduled to start on 10/13/22. Writer also would like to review take home medications.    Louise Willis RN on 10/12/2022 at 8:57 AM

## 2022-10-12 NOTE — PROGRESS NOTES
Nursing Note:  Noah Morrison presents today for Port Labs.    Patient seen by provider today: Yes: Dr. Arce   present during visit today: Not Applicable.    Note: N/A.    Intravenous Access:  Labs drawn without difficulty.  Implanted Port.    Discharge Plan:   Patient was sent to [facility] for 0800 appointment.    Jewell Beltran RN

## 2022-10-13 NOTE — PATIENT INSTRUCTIONS
Your chemo Pump will infuse for 46 hours. If at any time the pump should malfunction or you have pump concerns, please contact [facility] after hours at [phone].You will need an appointment for the pump disconnect in our clinic or with [facility].

## 2022-10-13 NOTE — PROGRESS NOTES
"Infusion Nursing Note:  Noah Morrison presents today for C3D1 Opdivo/FOLFOX.    Patient seen by provider yesterday: Yes: Dr. Arce   present during visit today: Not Applicable.    Note: Pt reports he had an episode of sudden back pain while he was getting treatment last time in [place]. He does not remember what drug was infusing. As far as he remembers, infusion was stopped and his sudden back pain went away. Infusion was resumed per Dr's order and completed successfully. He does not know if he received any meds for that event.      Pt was monitored closely during infusion. He tolerated well today without any events.       Prior to discharge: Port is secured in place with tegaderm and flushed with 10cc NS with positive blood return noted.  Continuous home infusion Dosi-Fuser pump connected.    All connectors secured in place and clamps taped open.    Pump started, \"running\" noted on display (CADD): Not Applicable.  Pump Connection double checked with Issa Lorenzana RN.  Patient instructed to call our clinic or [facility] with any questions or concerns at home.  Patient verbalized understanding.    Patient set up for pump disconnect at our clinic on 10/15/22.        Intravenous Access:  Implanted Port.    Treatment Conditions:  Lab Results   Component Value Date    HGB 10.5 (L) 10/12/2022    WBC 3.0 (L) 10/12/2022    ANEUTAUTO 1.4 (L) 10/12/2022     10/12/2022      Lab Results   Component Value Date     10/12/2022    POTASSIUM 3.8 10/12/2022    CR 0.75 10/12/2022    JILL 8.6 10/12/2022    BILITOTAL 0.4 10/12/2022    ALBUMIN 3.2 (L) 10/12/2022    ALT 37 10/12/2022    AST 11 10/12/2022     Results reviewed, labs MET treatment parameters, ok to proceed with treatment.    Post Infusion Assessment:  Patient tolerated infusion without incident.  Blood return noted pre and post infusion.  Site patent and intact, free from redness, edema or discomfort.  No evidence of extravasations. "     Discharge Plan:   Discharge instructions reviewed with: Patient and Family.  Patient and/or family verbalized understanding of discharge instructions and all questions answered.  AVS to patient via [employer].  Patient will return 10/15/22 for next appointment.   Patient discharged in stable condition accompanied by: self.  Departure Mode: Ambulatory.      Deb Mccollum RN

## 2022-10-15 NOTE — PROGRESS NOTES
Infusion Nursing Note:  Noah Morrison presents today for pump disconnect.    Patient seen by provider today: No   present during visit today: Not Applicable.    Note: N/A.    Intravenous Access:  Implanted Port.    Treatment Conditions:  Not Applicable.    Post Infusion Assessment:  Blood return noted at disconnect  Site patent and intact, free from redness, edema or discomfort.  No evidence of extravasations.  Access discontinued per protocol.     Discharge Plan:   Patient and/or family verbalized understanding of discharge instructions and all questions answered.  Patient discharged in stable condition accompanied by: self.  Departure Mode: Ambulatory.      Mary Ann Arce RN

## 2022-10-17 NOTE — PROGRESS NOTES
[facility]: Cancer Care Short Note                                    Discussion with Patient:                                                    Edilberto had C1D1 of FOLFOX on 10/13/22. Writer contacted him to see how he's feeling.        Assessment:                                                      Assessment completed with:: (P) Patient    Constitutional  Fatigue: Fatigue relieved by rest  Fever: Absent or within normal limits    Respiratory  Cough: Absent or within normal limits  Dyspnea: Absent or within normal limits    Gastrointestinal  Anorexia: Absent or within normal limits  Nausea: Absent or within normal limits  Vomiting: Absent or within normal limits  Dehydration: Absent or within normal limits  Mucositis Oral: Absent or within normal limits  Constipation: Absent or within normal limits  Diarrhea: Absent or within normal limits    Genitourinary  Patient Reported Genitourinary Symptoms?: No    Integumentary  Rash Maculo-Papular: Absent or within normal limits    Pain  Patient Reported Pain?: No  Pain Score: (P) No Pain (0)    Patient Coping  (P) Accepting    Clinic Utilization  Patient Aware of Next Appointment?: (P) Yes    Other Patient Concerns  Other Patient Reported Concerns: (P) No    Intervention/Education provided during outreach:                                                     Edilberto notes some slight fatigue, but is otherwise feeling okay. He verbalized understanding that he needs to contact writer if things change. He does not have any additional questions at this time.    Patient to follow up as scheduled at next appt    Signature:  Louise Willis RN.

## 2022-10-19 NOTE — ED TRIAGE NOTES
Pt with hx esophageal cancer arrives from imaging with DVTs. Pt had appointment yesterday and got called today to have US done. Denies SOB/CP. Has port accessed. ABC intact.

## 2022-10-19 NOTE — TELEPHONE ENCOUNTER
Received a call from radiologist regarding CT scan done today. Patient has a probable DVT in his left internal jugular vein. He will need an ultrasound to confirm.     Order for ultrasound placed by Dr. Arce. Per Dr. Arce, if ultrasound is positive for blood clot, he will need to proceed to the ED.    Patient scheduled for ultrasound at 3 PM at [facility]. Advised him to wait there for results since he may need to go to the ED afterwards. Patient verbalized understanding.

## 2022-10-19 NOTE — ED PROVIDER NOTES
History     Chief Complaint:  Abnormal Labs       HPI   Noah Morrison is a 57 year old male with past medical history of esophageal cancer who presents with incidental finding of left internal jugular vein thrombosis.  Patient was undergoing staging of his esophageal cancer today when he was found to have incidental finding of nonocclusive left internal jugular vein thrombosis.  This was confirmed by ultrasound prior to him coming to the emergency department.  Patient denies any pleuritic pain, chest pain, shortness of breath or swelling in his extremities.  He has not had blood clot before in the past.  He is not currently on any anticoagulation.    CT chest abdomen pelvis with contrast performed today  IMPRESSION:  1.  Probable DVT in the left internal jugular vein. Recommend ultrasound.  2.  Decreased circumferential wall thickening of the distal esophagus since 8/8/2022.  3.  Decreased size of previously FDG avid supraclavicular and abdominopelvic lymph nodes.  4.  Interval placement of common bile duct stent with decreased biliary dilatation.  5.  New small volume ascites.  6.  New gallbladder wall thickening may relate to volume overload.  7.  New diffuse colitis which is likely infectious/inflammatory.  8.  Indeterminate bilateral pulmonary nodules without significant change.    Ultrasound of bilateral upper extremities  IMPRESSION:   1. Nonocclusive deep vein thrombosis in the left internal jugular  vein, similar to the recent CT.  2. No deep vein thrombosis in the right upper extremity.    ROS:  Review of Systems   All other systems reviewed and are negative.    Allergies:  No Known Allergies     Medications:    acetaminophen (TYLENOL) 325 MG tablet  dexamethasone (DECADRON) 4 MG tablet  ondansetron (ZOFRAN) 8 MG tablet  OXYCODONE HCL PO  prochlorperazine (COMPAZINE) 10 MG tablet    Past Medical History:    Esophageal cancer    Past Surgical History:    Port placement    Family History:    No  "pertinent    Social History:   reports that he has quit smoking. He has never used smokeless tobacco. He reports current alcohol use. He reports current drug use. Drug: Marijuana.  PCP: No Ref-Primary, Physician     Physical Exam   Patient Vitals for the past 24 hrs:   BP Temp Temp src Pulse Resp SpO2 Height Weight   10/19/22 1713 105/69 -- -- 99 18 99 % -- --   10/19/22 1617 104/63 98.7  F (37.1  C) Temporal 102 18 100 % 1.727 m (5' 8\") 70.8 kg (156 lb 1.4 oz)        Physical Exam  General: Patient is awake, alert and interactive when I enter the room  Head: The scalp, face, and head appear normal  Eyes: The pupils are equal, round, and reactive to light. Conjunctivae and sclerae are normal  ENT: External acoustic canals are normal. The oropharynx is normal without erythema. Uvula is in the midline  Neck: Normal range of motion.   CV: Regular rate and rhythm.  Port present in right upper chest  Resp: Lungs are clear without wheezes or rales. No respiratory distress.   GI: Abdomen is soft, no rigidity, guarding, or rebound. No distension. No tenderness to palpation in any quadrant.     MS: Normal tone. Joints grossly normal without effusions. No asymmetric leg swelling, calf or thigh tenderness.    Skin: No rash or lesions noted. Normal capillary refill noted  Neuro: Speech is normal and fluent. Face is symmetric. Moving all extremities.   Psych:  Normal affect.  Appropriate interactions.      Emergency Department Course     Emergency Department Course:  Reviewed:  I reviewed nursing notes, vitals and past medical history    Consults:   Spoke with on-call physician for oncology regarding patient's presentation    Disposition:  The patient was discharged to home.     Impression & Plan      Medical Decision Making:  Patient is a 57-year-old gentleman with past medical history of esophageal cancer presents to the emergency department with incidental finding of nonocclusive left internal jugular deep vein thrombosis.  " Patient's not having any symptoms whatsoever.  No significant chest pain, shortness of breath, pleuritic pain or extremity swelling.  Discussed the case with on-call oncology and we agree the patient can be started on anticoagulation and followed up closely as outpatient.  Will be started on Xarelto.  Discussed findings and plan with patient and he is amenable at this time.    Diagnosis:    ICD-10-CM    1. Acute deep vein thrombosis of left internal jugular vein  I82.622            Discharge Medications:  New Prescriptions    RIVAROXABAN ANTICOAGULANT 15 & 20 MG TBPK STARTER THERAPY PACK    Take 15 mg by mouth 2 times daily (with meals) for 21 days, THEN 20 mg daily with food for 9 days.        10/19/2022   MD Rogelio Rios Christopher Joseph, MD  10/20/22 0052

## 2022-10-19 NOTE — TELEPHONE ENCOUNTER
Received a call from imaging reporting that Edilberto does have a DVT in his left internal jugular vein. No other clots were found.    Edilberto is still in the imaging department. They will instruct him to proceed to the ED.

## 2022-10-19 NOTE — TELEPHONE ENCOUNTER
On Call Oncology:    Received a call from the ER from Dr. Mercado regarding left internal jugular nonocclusive thrombus.     Pt had CT CAP today and was incidentally found to have suspicion for left internal jugular thrombus, this was confirmed on US dopplers b/l UE. Of note, US dopplers b/l LE were negative for thrombus.     Pt is not having any focal neurological symptoms or symptoms concerning for stroke. Pt is not having any clinical signs of PE including tachycardia, tachypnea, hypoxia (CT done today was not PE protocol).  Labs from 10/12/22 noted with no thrombocytopenia or significant anemia.     Dr. Mercado does not think there would be any role for interventional radiology as clot is nonocclusive. Overall, he thinks pt is stable and inquiring about recommendations regarding anticoagulation.     Based on the above information, ok to discharge patient on rivaroxaban (would avoid apixaban due to reported relative increase risk of bleeding with GI malignancies).     I asked Dr. Mercado to please make sure that patient's rivaroxaban is covered by insurance and available for pt to  from pharmacy today, prior to discharging patient so pt is able to start medication today, as he has thrombosis in setting of active malignancy.     Pt can continue follow up with his primary oncologist, Dr. Arce.     Alyssa Wright D.O.  Hematology/Oncology  [facility]

## 2022-10-20 NOTE — TELEPHONE ENCOUNTER
Writer contacted Edilberto to follow up on his ER visit on 10/19/22 for a clot in his internal jugular. Edilberto reports feeling good and that he didn't have any symptoms to begin with.  He is currently taking the following medications:         Edilberto is scheduled for chemotherapy on 10/26/22. Writer clarified with Dr. Arce that we will continue with this plan of care. Edilberto verbalized understanding and does not have any additional questions at this time.    Louise Willis RN on 10/20/2022 at 2:32 PM

## 2022-10-25 NOTE — LETTER
10/25/2022         RE: Noah Morrison  [address] [place] [address]        Dear Colleague,    Thank you for referring your patient, Noah Morrison, to the [facility]. Please see a copy of my visit note below.    Video-Visit Details    Type of service:  Video Visit    Video Start Time (time video started): 9:00     Video End Time (time video stopped): 9:20    Originating Location (pt. Location): Home        Distant Location (provider location):  On-site    Mode of Communication:  Video Conference via [facility] NUTRITION SERVICES - ASSESSMENT NOTE    Noah Morrison 57 year old referred for MNT related to cancer    Total Time Spent with patient: 20 min  Referred by: Maria Alejandra Arce  Reason for referral: Esophageal adenocarcinoma (H) nutrition referral  Patient accompanied by: self  Chemo: modified FOLFOX-6 and Nivolumab   Radiation: N/A   PEG: placed not currently using     Patient Medical History  No past medical history on file.    Current Medications    Current Outpatient Medications:      acetaminophen (TYLENOL) 325 MG tablet, Take 650 mg by mouth as needed, Disp: , Rfl:      dexamethasone (DECADRON) 4 MG tablet, Take 4 mg by mouth 2 times daily (with meals) 2-4 days after chemo, Disp: , Rfl:      ondansetron (ZOFRAN) 8 MG tablet, Take 1 tablet (8 mg) by mouth every 8 hours as needed for nausea, Disp: 30 tablet, Rfl: 1     OXYCODONE HCL PO, Take 20 mg by mouth every 6 hours as needed, Disp: , Rfl:      prochlorperazine (COMPAZINE) 10 MG tablet, Take 10 mg by mouth every 6 hours as needed for nausea or vomiting, Disp: , Rfl:      Rivaroxaban ANTICOAGULANT 15 & 20 MG TBPK Starter Therapy Pack, Take 15 mg by mouth 2 times daily (with meals) for 21 days, THEN 20 mg daily with food for 9 days., Disp: 51 each, Rfl: 0    Medications have been reviewed.    Pertinent lab results:  No results found for: CHOLESTEROL, TRIGLYCERIDE, HDL, LDL, HGBA1C, PREALB  Urea Nitrogen    Date Value Ref Range Status   10/12/2022 10.8 8.0 - 23.0 mg/dL Final     Potassium   Date Value Ref Range Status   10/12/2022 3.8 3.4 - 5.3 mmol/L Final     Labs have been reviewed and noted.    Treatment Plan:  Oncology History   Esophageal adenocarcinoma (H)   9/15/2022 -  Chemotherapy    OP ONC Gastroesophageal Cancer - Modified FOLFOX-6 and Nivolumab every 2 weeks  Plan Provider: Maria Alejandra Arce DO  Treatment goal: Palliative  Line of treatment: First Line     10/4/2022 Initial Diagnosis    Esophageal adenocarcinoma (H)       Treatment plan has been reviewed.    Food and Nutrition Related History  --Over the counter supplements: none   --Food Allergies: none  --Physical Activity: walking indoor  --Oncology treatment side effects: none at this time   --Factors effecting nutritional intake: none at this time     Pt has 1.4 [employer] (2 cases) 48 cartons-Patient was previously to take 5 cartons per day which provides: 2275 kcals 100 grams protein 1170 mL free fluid 15 grams fiber   TF, gravity feeding, 30 minutes/TF period (1.5 carton, 1.5 cartons, 1 cartons and then drank Boost)   TF placed the 3rd week in July. Prior to the TF placement, pt was drinking just Boost for 1 month.  Pt reported ~187 lbs UBW (prior to cancer status)    Patient eating 3 meals per day.  Patient does not have dysphagia as prior to treatment.  Patient is currently living with sister.     Diet Recall  Breakfast Breakfast sandwich; cereal and milk ([employer] Wheat)    Lunch Leftover habachi rice, shrimp and steak   Dinner Pork steak + mashed potatoes and gravy + beans + cucumber salad    Snacks Pie; cookies-likes sweets    Beverages Powerade Grape (32 oz) 1-2; water       ANTHROPOMETRICS  Height: 5'8  Weight: 156 lbs   BMI: 23.73 kg/m2  Weight Status:  Normal BMI  IBW:  70 kg (101%)  Weight History: Patient with 22% weight loss prior to TF placement.  Patient's weight increasing and off TF.    Wt Readings from Last 10 Encounters:   10/19/22  70.8 kg (156 lb 1.4 oz)   10/13/22 68.9 kg (152 lb)   10/12/22 66.7 kg (147 lb)   10/04/22 66.5 kg (146 lb 8 oz)     Dosing Weight: 70.9 kg (actual body wt)     MALNUTRITION:  % Weight Loss:  Previous weight loss of 22% and now current weight trend increasing.   % Intake:  No decreased intake noted  Subcutaneous Fat Loss:  None observed  Muscle Loss:  None observed  Fluid Retention:  None noted    Malnutrition Diagnosis: Patient does not meet two of the above criteria necessary for diagnosing malnutrition    ASSESSED NUTRITION NEEDS:  Estimated Energy Needs: 1770 - 2130 kcals (25-30 Kcal/Kg)  Justification: maintenance and repletion  Estimated Protein Needs:  85- 106 grams protein (1.2-1.5 g pro/Kg)  Justification: maintenance and Repletion  Estimated Fluid Needs: 1770 - 2130 mL   Justification: maintenance    Nutrition Diagnosis:  Increased nutrient needs related to side effects of cancer therapy as evidenced by needs of maintaining/gaining wt     Intervention/Recommendations:  Provided written & verbal education:     - Reviewed nutrition and hydration needs.   Advised pt to aim for at least 2100 kcal and  g protein daily.   Advised pt to aim for 7+ cups non-caffeine containing beverages (water/electrolytes) daily.    - Discussed strategies to help fortify meals and snacks with calories and protein.   - Encouraged to focus on 5-6 small, frequent meals/snacks.   - Encouraged to have a protein source with each meal and snack.    - Reviewed oral nutrition supplement.  Suggest consuming remaining [employer] 1.4 to provide 455 calories and 20 grams protein.   - Encouraged utilizing these ONS in home made shakes/smoothies to prevent flavor fatigue.      Provided pt with corresponding education materials/handouts on:  High Calorie/High Protein Recipe booklet, Academy of Nutrition and Dietetics High Protein list     Goals:  1. Eat 5-6 times per day  2. Aim for 2200 kcals and 85 grams protein per day   3. Drink 1 ONS  per day     Monitor and Evaluation:  Patient has RD contact information for further nutrition questions/concerns.     Nimisha Clark, RD, LD  Clinical Dietitian  [facility]  [phone] (direct)    HPI      ROS      Physical Exam           Again, thank you for allowing me to participate in the care of your patient.        Sincerely,        Dae Marcial RD, LD

## 2022-10-25 NOTE — LETTER
10/25/2022         RE: Noah Morrison  [address] [place] [address]        Dear Colleague,    Thank you for referring your patient, Noah Morrison, to the [facility]. Please see a copy of my visit note below.    Video-Visit Details    Type of service:  Video Visit    Video Start Time (time video started): 9:00     Video End Time (time video stopped): 9:20    Originating Location (pt. Location): Home        Distant Location (provider location):  On-site    Mode of Communication:  Video Conference via [facility] NUTRITION SERVICES - ASSESSMENT NOTE    Noah Morrison 57 year old referred for MNT related to cancer    Total Time Spent with patient: 20 min  Referred by: Maria Alejandra Arce  Reason for referral: Esophageal adenocarcinoma (H) nutrition referral  Patient accompanied by: self  Chemo: modified FOLFOX-6 and Nivolumab   Radiation: N/A   PEG: placed not currently using     Patient Medical History  No past medical history on file.    Current Medications    Current Outpatient Medications:      acetaminophen (TYLENOL) 325 MG tablet, Take 650 mg by mouth as needed, Disp: , Rfl:      dexamethasone (DECADRON) 4 MG tablet, Take 4 mg by mouth 2 times daily (with meals) 2-4 days after chemo, Disp: , Rfl:      ondansetron (ZOFRAN) 8 MG tablet, Take 1 tablet (8 mg) by mouth every 8 hours as needed for nausea, Disp: 30 tablet, Rfl: 1     OXYCODONE HCL PO, Take 20 mg by mouth every 6 hours as needed, Disp: , Rfl:      prochlorperazine (COMPAZINE) 10 MG tablet, Take 10 mg by mouth every 6 hours as needed for nausea or vomiting, Disp: , Rfl:      Rivaroxaban ANTICOAGULANT 15 & 20 MG TBPK Starter Therapy Pack, Take 15 mg by mouth 2 times daily (with meals) for 21 days, THEN 20 mg daily with food for 9 days., Disp: 51 each, Rfl: 0    Medications have been reviewed.    Pertinent lab results:  No results found for: CHOLESTEROL, TRIGLYCERIDE, HDL, LDL, HGBA1C, PREALB  Urea Nitrogen    Date Value Ref Range Status   10/12/2022 10.8 8.0 - 23.0 mg/dL Final     Potassium   Date Value Ref Range Status   10/12/2022 3.8 3.4 - 5.3 mmol/L Final     Labs have been reviewed and noted.    Treatment Plan:  Oncology History   Esophageal adenocarcinoma (H)   9/15/2022 -  Chemotherapy    OP ONC Gastroesophageal Cancer - Modified FOLFOX-6 and Nivolumab every 2 weeks  Plan Provider: Maria Alejandra Arce DO  Treatment goal: Palliative  Line of treatment: First Line     10/4/2022 Initial Diagnosis    Esophageal adenocarcinoma (H)       Treatment plan has been reviewed.    Food and Nutrition Related History  --Over the counter supplements: none   --Food Allergies: none  --Physical Activity: walking indoor  --Oncology treatment side effects: none at this time   --Factors effecting nutritional intake: none at this time     Pt has 1.4 [employer] (2 cases) 48 cartons-Patient was previously to take 5 cartons per day which provides: 2275 kcals 100 grams protein 1170 mL free fluid 15 grams fiber   TF, gravity feeding, 30 minutes/TF period (1.5 carton, 1.5 cartons, 1 cartons and then drank Boost)   TF placed the 3rd week in July. Prior to the TF placement, pt was drinking just Boost for 1 month.  Pt reported ~187 lbs UBW (prior to cancer status)    Patient eating 3 meals per day.  Patient does not have dysphagia as prior to treatment.  Patient is currently living with sister.     Diet Recall  Breakfast Breakfast sandwich; cereal and milk ([employer])    Lunch Leftover habachi rice, shrimp and steak   Dinner Pork steak + mashed potatoes and gravy + beans + cucumber salad    Snacks Pie; cookies-likes sweets    Beverages Powerade Grape (32 oz) 1-2; water       ANTHROPOMETRICS  Height: 5'8  Weight: 156 lbs   BMI: 23.73 kg/m2  Weight Status:  Normal BMI  IBW:  70 kg (101%)  Weight History: Patient with 22% weight loss prior to TF placement.  Patient's weight increasing and off TF.    Wt Readings from Last 10 Encounters:   10/19/22  70.8 kg (156 lb 1.4 oz)   10/13/22 68.9 kg (152 lb)   10/12/22 66.7 kg (147 lb)   10/04/22 66.5 kg (146 lb 8 oz)     Dosing Weight: 70.9 kg (actual body wt)     MALNUTRITION:  % Weight Loss:  Previous weight loss of 22% and now current weight trend increasing.   % Intake:  No decreased intake noted  Subcutaneous Fat Loss:  None observed  Muscle Loss:  None observed  Fluid Retention:  None noted    Malnutrition Diagnosis: Patient does not meet two of the above criteria necessary for diagnosing malnutrition    ASSESSED NUTRITION NEEDS:  Estimated Energy Needs: 1770 - 2130 kcals (25-30 Kcal/Kg)  Justification: maintenance and repletion  Estimated Protein Needs:  85- 106 grams protein (1.2-1.5 g pro/Kg)  Justification: maintenance and Repletion  Estimated Fluid Needs: 1770 - 2130 mL   Justification: maintenance    Nutrition Diagnosis:  Increased nutrient needs related to side effects of cancer therapy as evidenced by needs of maintaining/gaining wt     Intervention/Recommendations:  Provided written & verbal education:     - Reviewed nutrition and hydration needs.   Advised pt to aim for at least 2100 kcal and  g protein daily.   Advised pt to aim for 7+ cups non-caffeine containing beverages (water/electrolytes) daily.    - Discussed strategies to help fortify meals and snacks with calories and protein.   - Encouraged to focus on 5-6 small, frequent meals/snacks.   - Encouraged to have a protein source with each meal and snack.    - Reviewed oral nutrition supplement.  Suggest consuming remaining [employer] 1.4 to provide 455 calories and 20 grams protein.   - Encouraged utilizing these ONS in home made shakes/smoothies to prevent flavor fatigue.      Provided pt with corresponding education materials/handouts on:  High Calorie/High Protein Recipe booklet, Academy of Nutrition and Dietetics High Protein list     Goals:  1. Eat 5-6 times per day  2. Aim for 2200 kcals and 85 grams protein per day   3. Drink 1 ONS  per day     Monitor and Evaluation:  Patient has RD contact information for further nutrition questions/concerns.     Nimisha Clark, RD, LD  Clinical Dietitian  [facility]  [phone] (direct)    HPI      ROS      Physical Exam           Again, thank you for allowing me to participate in the care of your patient.        Sincerely,        Dae Marcial RD, LD

## 2022-10-25 NOTE — PROGRESS NOTES
Video-Visit Details    Type of service:  Video Visit    Video Start Time (time video started): 9:00     Video End Time (time video stopped): 9:20    Originating Location (pt. Location): Home        Distant Location (provider location):  On-site    Mode of Communication:  Video Conference via [facility] NUTRITION SERVICES - ASSESSMENT NOTE    Noah Morrison 57 year old referred for MNT related to cancer    Total Time Spent with patient: 20 min  Referred by: Maria Alejandra Arce  Reason for referral: Esophageal adenocarcinoma (H) nutrition referral  Patient accompanied by: self  Chemo: modified FOLFOX-6 and Nivolumab   Radiation: N/A   PEG: placed not currently using     Patient Medical History  No past medical history on file.    Current Medications    Current Outpatient Medications:      acetaminophen (TYLENOL) 325 MG tablet, Take 650 mg by mouth as needed, Disp: , Rfl:      dexamethasone (DECADRON) 4 MG tablet, Take 4 mg by mouth 2 times daily (with meals) 2-4 days after chemo, Disp: , Rfl:      ondansetron (ZOFRAN) 8 MG tablet, Take 1 tablet (8 mg) by mouth every 8 hours as needed for nausea, Disp: 30 tablet, Rfl: 1     OXYCODONE HCL PO, Take 20 mg by mouth every 6 hours as needed, Disp: , Rfl:      prochlorperazine (COMPAZINE) 10 MG tablet, Take 10 mg by mouth every 6 hours as needed for nausea or vomiting, Disp: , Rfl:      Rivaroxaban ANTICOAGULANT 15 & 20 MG TBPK Starter Therapy Pack, Take 15 mg by mouth 2 times daily (with meals) for 21 days, THEN 20 mg daily with food for 9 days., Disp: 51 each, Rfl: 0    Medications have been reviewed.    Pertinent lab results:  No results found for: CHOLESTEROL, TRIGLYCERIDE, HDL, LDL, HGBA1C, PREALB  Urea Nitrogen   Date Value Ref Range Status   10/12/2022 10.8 8.0 - 23.0 mg/dL Final     Potassium   Date Value Ref Range Status   10/12/2022 3.8 3.4 - 5.3 mmol/L Final     Labs have been reviewed and noted.    Treatment Plan:  Oncology History   Esophageal  adenocarcinoma (H)   9/15/2022 -  Chemotherapy    OP ONC Gastroesophageal Cancer - Modified FOLFOX-6 and Nivolumab every 2 weeks  Plan Provider: Maria Alejandra Arce DO  Treatment goal: Palliative  Line of treatment: First Line     10/4/2022 Initial Diagnosis    Esophageal adenocarcinoma (H)       Treatment plan has been reviewed.    Food and Nutrition Related History  --Over the counter supplements: none   --Food Allergies: none  --Physical Activity: walking indoor  --Oncology treatment side effects: none at this time   --Factors effecting nutritional intake: none at this time     Pt has 1.4 [employer] (2 cases) 48 cartons-Patient was previously to take 5 cartons per day which provides: 2275 kcals 100 grams protein 1170 mL free fluid 15 grams fiber   TF, gravity feeding, 30 minutes/TF period (1.5 carton, 1.5 cartons, 1 cartons and then drank Boost)   TF placed the 3rd week in July. Prior to the TF placement, pt was drinking just Boost for 1 month.  Pt reported ~187 lbs UBW (prior to cancer status)    Patient eating 3 meals per day.  Patient does not have dysphagia as prior to treatment.  Patient is currently living with sister.     Diet Recall  Breakfast Breakfast sandwich; cereal and milk (Shredded Wheat)    Lunch Leftover habachi rice, shrimp and steak   Dinner Pork steak + mashed potatoes and gravy + beans + cucumber salad    Snacks Pie; cookies-likes sweets    Beverages Powerade Grape (32 oz) 1-2; water       ANTHROPOMETRICS  Height: 5'8  Weight: 156 lbs   BMI: 23.73 kg/m2  Weight Status:  Normal BMI  IBW:  70 kg (101%)  Weight History: Patient with 22% weight loss prior to TF placement.  Patient's weight increasing and off TF.    Wt Readings from Last 10 Encounters:   10/19/22 70.8 kg (156 lb 1.4 oz)   10/13/22 68.9 kg (152 lb)   10/12/22 66.7 kg (147 lb)   10/04/22 66.5 kg (146 lb 8 oz)     Dosing Weight: 70.9 kg (actual body wt)     MALNUTRITION:  % Weight Loss:  Previous weight loss of 22% and now current weight  trend increasing.   % Intake:  No decreased intake noted  Subcutaneous Fat Loss:  None observed  Muscle Loss:  None observed  Fluid Retention:  None noted    Malnutrition Diagnosis: Patient does not meet two of the above criteria necessary for diagnosing malnutrition    ASSESSED NUTRITION NEEDS:  Estimated Energy Needs: 1770 - 2130 kcals (25-30 Kcal/Kg)  Justification: maintenance and repletion  Estimated Protein Needs:  85- 106 grams protein (1.2-1.5 g pro/Kg)  Justification: maintenance and Repletion  Estimated Fluid Needs: 1770 - 2130 mL   Justification: maintenance    Nutrition Diagnosis:  Increased nutrient needs related to side effects of cancer therapy as evidenced by needs of maintaining/gaining wt     Intervention/Recommendations:  Provided written & verbal education:     - Reviewed nutrition and hydration needs.   Advised pt to aim for at least 2100 kcal and  g protein daily.   Advised pt to aim for 7+ cups non-caffeine containing beverages (water/electrolytes) daily.    - Discussed strategies to help fortify meals and snacks with calories and protein.   - Encouraged to focus on 5-6 small, frequent meals/snacks.   - Encouraged to have a protein source with each meal and snack.    - Reviewed oral nutrition supplement.  Suggest consuming remaining [employer] 1.4 to provide 455 calories and 20 grams protein.   - Encouraged utilizing these ONS in home made shakes/smoothies to prevent flavor fatigue.      Provided pt with corresponding education materials/handouts on:  High Calorie/High Protein Recipe booklet, Academy of Nutrition and Dietetics High Protein list     Goals:  1. Eat 5-6 times per day  2. Aim for 2200 kcals and 85 grams protein per day   3. Drink 1 ONS per day     Monitor and Evaluation:  Patient has RD contact information for further nutrition questions/concerns.     Nimisha Clark, RD, LD  Clinical Dietitian  [facility]  [phone]  (direct)    HPI      ROS      Physical Exam

## 2022-10-25 NOTE — PROGRESS NOTES
Oncology/Hematology Visit Note  Oct 26, 2022    Reason for Visit: Follow up of esophageal cancer    History of Present Illness: Patient developed dysphagia to solids beginning in mid-May 2022 accomopanied by unintentional >35 lb weight loss. Esophagogram was performed on 6/29/22: Significant narrowing and mucosal irregularity involving the distal thoracic esophagus with partial obstruction, most likely representing an esophageal carcinoma. A significant esophagitis is an additional although less likely possibility.      EGD was performed on 6/30/22 with near obstructing mass int he thoracic esophagus approximately 40 cm from the incisors. Path returned as poorly differentiated adenocarcinoma with focal signet ring morphology.     Evaluated by med onc and rad onc with PET/CT ordered. PEG tube was placed with IR given continued weight loss.     Per record review (9/15/22- [facility]):   -Esophageal cancer, middle third:  -6/30/22: Esophageal biopsy: poorly differentiated adenocarcinoma with focal signet ring morphology (HER2 0+; no evidence of MSI-H).  -7/12/22: CEA 21.1.  -8/8/22: PET scan: Distal esophageal thickening with intense hypermetabolic activity consistent with known esophgeal cancer. Abnormal hypermetabolic metastatic cervical, supraclavicular, peritoneal, and retroperitoneal LAD.   -8/9-8/23/22: Concurrent chemoradiation with weekly carboplatin plus paclitaxel.  -CARIS: PDL CPS5, ERBB2 negative, MSI stable, MMR proficient, TMB low, mutations identified in AXIN1, CDKN21, and TP53.  -Started on FOLFOX plus nivolumab on 9/15/22. .     Patient has had MRCP performed due to elevated liver enzymes which revealed a peripancreatic LN causing a blockage and dilation of the CBD. A metal stent was placed and LFTs improved.     He has continued on Opdivo/FOLFOX.     CT 10/19/22 with positive response to treatment, incidental DVT of left internal jugular vein. Started on Xarelto.  "    Interval History:  Edilberto is feeling well. He started Xarelto and denies any issues with this. No arm swelling or neck concerns. He is tolerating treatment well. Cold sensitivity is more persistent now but no other neuropathy concerns. No fevers, dizziness, mouth sores, chest pain, SOB, cough, nausea, vomiting, abdominal pain, diarrhea, swelling, rashes, bleeding. Eating and drinking well.     Current Outpatient Medications   Medication Sig Dispense Refill     acetaminophen (TYLENOL) 325 MG tablet Take 650 mg by mouth as needed       dexamethasone (DECADRON) 4 MG tablet Take 4 mg by mouth 2 times daily (with meals) 2-4 days after chemo       ondansetron (ZOFRAN) 8 MG tablet Take 1 tablet (8 mg) by mouth every 8 hours as needed for nausea 30 tablet 1     OXYCODONE HCL PO Take 20 mg by mouth every 6 hours as needed       prochlorperazine (COMPAZINE) 10 MG tablet Take 10 mg by mouth every 6 hours as needed for nausea or vomiting       Rivaroxaban ANTICOAGULANT 15 & 20 MG TBPK Starter Therapy Pack Take 15 mg by mouth 2 times daily (with meals) for 21 days, THEN 20 mg daily with food for 9 days. 51 each 0       Past Medical History  No past medical history on file.  No past surgical history on file.  No Known Allergies  Social History   Social History     Tobacco Use     Smoking status: Former     Smokeless tobacco: Never   Substance Use Topics     Alcohol use: Yes     Drug use: Yes     Types: Marijuana      Past medical history and social history were reviewed.    Physical Examination:  /70   Pulse 91   Temp 97.5  F (36.4  C) (Tympanic)   Resp 19   Ht 1.727 m (5' 8\")   Wt 70.4 kg (155 lb 3.2 oz)   SpO2 100%   BMI 23.60 kg/m    Wt Readings from Last 10 Encounters:   10/19/22 70.8 kg (156 lb 1.4 oz)   10/13/22 68.9 kg (152 lb)   10/12/22 66.7 kg (147 lb)   10/04/22 66.5 kg (146 lb 8 oz)     Constitutional: Well-appearing male in no acute distress.  Eyes: EOMI, PERRL. No scleral icterus.  ENT: " Deferred  Lymphatic: Neck is supple without cervical or supraclavicular lymphadenopathy. .  Cardiovascular: Regular rate and rhythm. No murmurs, gallops, or rubs. No peripheral edema.  Respiratory: Clear to auscultation bilaterally. No wheezes or crackles.  Gastrointestinal: Bowel sounds present. Abdomen soft, non-tender. No palpable hepatosplenomegaly or masses.   Neurologic: Cranial nerves II through XII are grossly intact.  Skin: No rashes, petechiae, or bruising noted on exposed skin.    Laboratory Data:   Latest Reference Range & Units 10/26/22 09:14   Sodium 136 - 145 mmol/L 140   Potassium 3.4 - 5.3 mmol/L 4.2   Chloride 98 - 107 mmol/L 106   Carbon Dioxide (CO2) 22 - 29 mmol/L 25   Urea Nitrogen 8.0 - 23.0 mg/dL 11.9   Creatinine 0.67 - 1.17 mg/dL 0.79   GFR Estimate >60 mL/min/1.73m2 >90   Calcium 8.6 - 10.0 mg/dL 8.7   Anion Gap 7 - 15 mmol/L 9   Albumin 3.5 - 5.2 g/dL 3.1 (L)   Protein Total 6.4 - 8.3 g/dL 6.0 (L)   Alkaline Phosphatase 40 - 129 U/L 101   ALT 10 - 50 U/L 27   AST 10 - 50 U/L 13   Bilirubin Total <=1.2 mg/dL 0.5   Glucose 70 - 99 mg/dL 142 (H)   TSH 0.30 - 4.20 uIU/mL 0.93   WBC 4.0 - 11.0 10e3/uL 2.0 (L)   Hemoglobin 13.3 - 17.7 g/dL 10.7 (L)   Hematocrit 40.0 - 53.0 % 34.3 (L)   Platelet Count 150 - 450 10e3/uL 155   RBC Count 4.40 - 5.90 10e6/uL 3.40 (L)   MCV 78 - 100 fL 101 (H)   MCH 26.5 - 33.0 pg 31.5   MCHC 31.5 - 36.5 g/dL 31.2 (L)   RDW 10.0 - 15.0 % 20.7 (H)   % Neutrophils % 45   % Lymphocytes % 32   % Monocytes % 18   % Eosinophils % 2   % Basophils % 2   Absolute Basophils 0.0 - 0.2 10e3/uL 0.0   Absolute Eosinophils 0.0 - 0.7 10e3/uL 0.0   Absolute Immature Granulocytes <=0.4 10e3/uL 0.0   Absolute Lymphocytes 0.8 - 5.3 10e3/uL 0.6 (L)   Absolute Monocytes 0.0 - 1.3 10e3/uL 0.4   % Immature Granulocytes % 1   Absolute Neutrophils 1.6 - 8.3 10e3/uL 0.9 (L)   Absolute NRBCs 10e3/uL 0.0   NRBCs per 100 WBC <1 /100 0   (L): Data is abnormally low  (H): Data is abnormally  high      Assessment and Plan:  1. Metastatic Esophageal Adenocarcinoma   PDL1 CPS5, HER2 negative, JUNIOR. Lymph node involvement. S/p chemoradiation, now on FOLFOX + Nivolumab since 9/15/22. Tolerating well.    Due for cycle 4 today. ANC low at 0.9. Defer treatment by one week. Reviewed with Dr. Arce. Hold off on removing bolus or adding Neulasta until we have recurrent neutropenia issues.    Reviewed CT from 10/19/22 that shows positive response to treatment. He has no signs or symptoms of colitis or ascites. Monitor.    2. Nutrition   Doing much better, not needing PEG. Has gained weight.    3. Left IV Thrombosis  Incidental on CT 10/19/22. Asymptomatic. On Xarelto. Continue.    20 minutes spent on the date of the encounter doing chart review, review of test results, interpretation of tests, patient visit and documentation     Shadi Powers PA-C  Department of Hematology and Oncology  [facility]

## 2022-10-26 NOTE — NURSING NOTE
"Oncology Rooming Note    October 26, 2022 9:41 AM   Noah Morrison is a 57 year old male who presents for:    Chief Complaint   Patient presents with     Oncology Clinic Visit     Esophageal adenocarcinoma        Initial Vitals: /70   Pulse 91   Temp 97.5  F (36.4  C) (Tympanic)   Resp 19   Ht 1.727 m (5' 8\")   Wt 70.4 kg (155 lb 3.2 oz)   SpO2 100%   BMI 23.60 kg/m   Estimated body mass index is 23.6 kg/m  as calculated from the following:    Height as of this encounter: 1.727 m (5' 8\").    Weight as of this encounter: 70.4 kg (155 lb 3.2 oz). Body surface area is 1.84 meters squared.  No Pain (0) Comment: Data Unavailable   No LMP for male patient.  Allergies reviewed: Yes  Medications reviewed: Yes    Medications: Medication refills not needed today.  Pharmacy name entered into [facility]: [id number] [place], [place] - [address]    Clinical concerns: follow up       Edita Manning CMA              "

## 2022-10-26 NOTE — LETTER
10/26/2022         RE: Noah Morrison  [id number] [place] [address]        Dear Colleague,    Thank you for referring your patient, Noah Morrison, to the [facility]. Please see a copy of my visit note below.    Oncology/Hematology Visit Note  Oct 26, 2022    Reason for Visit: Follow up of esophageal cancer    History of Present Illness: Patient developed dysphagia to solids beginning in mid-May 2022 accomopanied by unintentional >35 lb weight loss. Esophagogram was performed on 6/29/22: Significant narrowing and mucosal irregularity involving the distal thoracic esophagus with partial obstruction, most likely representing an esophageal carcinoma. A significant esophagitis is an additional although less likely possibility.      EGD was performed on 6/30/22 with near obstructing mass int he thoracic esophagus approximately 40 cm from the incisors. Path returned as poorly differentiated adenocarcinoma with focal signet ring morphology.     Evaluated by med onc and rad onc with PET/CT ordered. PEG tube was placed with IR given continued weight loss.     Per record review (9/15/22- [facility]):   -Esophageal cancer, middle third:  -6/30/22: Esophageal biopsy: poorly differentiated adenocarcinoma with focal signet ring morphology (HER2 0+; no evidence of MSI-H).  -7/12/22: CEA 21.1.  -8/8/22: PET scan: Distal esophageal thickening with intense hypermetabolic activity consistent with known esophgeal cancer. Abnormal hypermetabolic metastatic cervical, supraclavicular, peritoneal, and retroperitoneal LAD.   -8/9-8/23/22: Concurrent chemoradiation with weekly carboplatin plus paclitaxel.  -CARIS: PDL CPS5, ERBB2 negative, MSI stable, MMR proficient, TMB low, mutations identified in AXIN1, CDKN21, and TP53.  -Started on FOLFOX plus nivolumab on 9/15/22. .     Patient has had MRCP performed due to elevated liver enzymes which revealed a  "peripancreatic LN causing a blockage and dilation of the CBD. A metal stent was placed and LFTs improved.     He has continued on Opdivo/FOLFOX.     CT 10/19/22 with positive response to treatment, incidental DVT of left internal jugular vein. Started on Xarelto.     Interval History:  Edilberto is feeling well. He started Xarelto and denies any issues with this. No arm swelling or neck concerns. He is tolerating treatment well. Cold sensitivity is more persistent now but no other neuropathy concerns. No fevers, dizziness, mouth sores, chest pain, SOB, cough, nausea, vomiting, abdominal pain, diarrhea, swelling, rashes, bleeding. Eating and drinking well.     Current Outpatient Medications   Medication Sig Dispense Refill     acetaminophen (TYLENOL) 325 MG tablet Take 650 mg by mouth as needed       dexamethasone (DECADRON) 4 MG tablet Take 4 mg by mouth 2 times daily (with meals) 2-4 days after chemo       ondansetron (ZOFRAN) 8 MG tablet Take 1 tablet (8 mg) by mouth every 8 hours as needed for nausea 30 tablet 1     OXYCODONE HCL PO Take 20 mg by mouth every 6 hours as needed       prochlorperazine (COMPAZINE) 10 MG tablet Take 10 mg by mouth every 6 hours as needed for nausea or vomiting       Rivaroxaban ANTICOAGULANT 15 & 20 MG TBPK Starter Therapy Pack Take 15 mg by mouth 2 times daily (with meals) for 21 days, THEN 20 mg daily with food for 9 days. 51 each 0       Past Medical History  No past medical history on file.  No past surgical history on file.  No Known Allergies  Social History   Social History     Tobacco Use     Smoking status: Former     Smokeless tobacco: Never   Substance Use Topics     Alcohol use: Yes     Drug use: Yes     Types: Marijuana      Past medical history and social history were reviewed.    Physical Examination:  /70   Pulse 91   Temp 97.5  F (36.4  C) (Tympanic)   Resp 19   Ht 1.727 m (5' 8\")   Wt 70.4 kg (155 lb 3.2 oz)   SpO2 100%   BMI 23.60 kg/m    Wt Readings from " Last 10 Encounters:   10/19/22 70.8 kg (156 lb 1.4 oz)   10/13/22 68.9 kg (152 lb)   10/12/22 66.7 kg (147 lb)   10/04/22 66.5 kg (146 lb 8 oz)     Constitutional: Well-appearing male in no acute distress.  Eyes: EOMI, PERRL. No scleral icterus.  ENT: Deferred  Lymphatic: Neck is supple without cervical or supraclavicular lymphadenopathy. .  Cardiovascular: Regular rate and rhythm. No murmurs, gallops, or rubs. No peripheral edema.  Respiratory: Clear to auscultation bilaterally. No wheezes or crackles.  Gastrointestinal: Bowel sounds present. Abdomen soft, non-tender. No palpable hepatosplenomegaly or masses.   Neurologic: Cranial nerves II through XII are grossly intact.  Skin: No rashes, petechiae, or bruising noted on exposed skin.    Laboratory Data:   Latest Reference Range & Units 10/26/22 09:14   Sodium 136 - 145 mmol/L 140   Potassium 3.4 - 5.3 mmol/L 4.2   Chloride 98 - 107 mmol/L 106   Carbon Dioxide (CO2) 22 - 29 mmol/L 25   Urea Nitrogen 8.0 - 23.0 mg/dL 11.9   Creatinine 0.67 - 1.17 mg/dL 0.79   GFR Estimate >60 mL/min/1.73m2 >90   Calcium 8.6 - 10.0 mg/dL 8.7   Anion Gap 7 - 15 mmol/L 9   Albumin 3.5 - 5.2 g/dL 3.1 (L)   Protein Total 6.4 - 8.3 g/dL 6.0 (L)   Alkaline Phosphatase 40 - 129 U/L 101   ALT 10 - 50 U/L 27   AST 10 - 50 U/L 13   Bilirubin Total <=1.2 mg/dL 0.5   Glucose 70 - 99 mg/dL 142 (H)   TSH 0.30 - 4.20 uIU/mL 0.93   WBC 4.0 - 11.0 10e3/uL 2.0 (L)   Hemoglobin 13.3 - 17.7 g/dL 10.7 (L)   Hematocrit 40.0 - 53.0 % 34.3 (L)   Platelet Count 150 - 450 10e3/uL 155   RBC Count 4.40 - 5.90 10e6/uL 3.40 (L)   MCV 78 - 100 fL 101 (H)   MCH 26.5 - 33.0 pg 31.5   MCHC 31.5 - 36.5 g/dL 31.2 (L)   RDW 10.0 - 15.0 % 20.7 (H)   % Neutrophils % 45   % Lymphocytes % 32   % Monocytes % 18   % Eosinophils % 2   % Basophils % 2   Absolute Basophils 0.0 - 0.2 10e3/uL 0.0   Absolute Eosinophils 0.0 - 0.7 10e3/uL 0.0   Absolute Immature Granulocytes <=0.4 10e3/uL 0.0   Absolute Lymphocytes 0.8 - 5.3 10e3/uL  0.6 (L)   Absolute Monocytes 0.0 - 1.3 10e3/uL 0.4   % Immature Granulocytes % 1   Absolute Neutrophils 1.6 - 8.3 10e3/uL 0.9 (L)   Absolute NRBCs 10e3/uL 0.0   NRBCs per 100 WBC <1 /100 0   (L): Data is abnormally low  (H): Data is abnormally high      Assessment and Plan:  1. Metastatic Esophageal Adenocarcinoma   PDL1 CPS5, HER2 negative, JUNIOR. Lymph node involvement. S/p chemoradiation, now on FOLFOX + Nivolumab since 9/15/22. Tolerating well.    Due for cycle 4 today. ANC low at 0.9. Defer treatment by one week. Pending ANC improvement will either drop 5FU bolus vs add Neulasta. Reviewed neutropenic precautions.    Reviewed CT from 10/19/22 that shows positive response to treatment. He has no signs or symptoms of colitis or ascites. Monitor.    2. Nutrition   Doing much better, not needing PEG. Has gained weight.    3. Left IV Thrombosis  Incidental on CT 10/19/22. Asymptomatic. On Xarelto. Continue.    20 minutes spent on the date of the encounter doing chart review, review of test results, interpretation of tests, patient visit and documentation     Shadi Powers PA-C  Department of Hematology and Oncology  [facility]         Again, thank you for allowing me to participate in the care of your patient.        Sincerely,        LANA Hawkins

## 2022-11-01 NOTE — LETTER
November 2, 2022          To Whom It May Concern,    I am clearing Edilberto Morrison to return to work per his request without any restrictions. Please feel free to contact our team at [phone] with questions.    Sincerely,    Maria Alejandra Arce, DO

## 2022-11-01 NOTE — PROGRESS NOTES
Writer received call from Edilberto's sister, Radha, explaining that Edilberto's FMLA is running out soon and they need to either 1) resubmit to Edilberto's HR or 2) get a letter from Dr. Arce clearing Edilberto to return to work. Radha explains that Edilberto works in a remote area of [place] where he would be 2.5 hours from a cancer center. Per chart review, Edilberto continues on FOLFOX treatment. Radha is concerned about Edilberto's cell counts and him being at high risk for complications. She would like Dr. Arce's input on the situation.    Radha is available for call back from Dr. Arce's RNCC, Louise, at [phone].    Jolynn Grace, RN, BSN, OCN  Nurse Care Coordinator  [facility]  P: [phone]     F: [phone]

## 2022-11-01 NOTE — PROGRESS NOTES
"Infusion Nursing Note:  Noah Morrison presents today for C4D1 Mod. Folfox/Opdivo.    Patient seen by provider today: No   present during visit today: Not Applicable.    Note: Patient states he has decadron at home to take on days 2-4.    Intravenous Access:  Implanted Port.  Previously accessed in [facility].    Treatment Conditions:  Lab Results   Component Value Date    HGB 11.3 (L) 11/01/2022    WBC 4.4 11/01/2022    ANEU 2.7 11/01/2022    ANEUTAUTO 0.9 (L) 10/26/2022     11/01/2022      Lab Results   Component Value Date     11/01/2022    POTASSIUM 3.9 11/01/2022    CR 0.71 11/01/2022    JILL 8.6 11/01/2022    BILITOTAL 0.4 11/01/2022    ALBUMIN 2.7 (L) 11/01/2022    ALT 27 11/01/2022    AST 7 11/01/2022     Results reviewed, labs MET treatment parameters, ok to proceed with treatment.  TSH=0.92.    Post Infusion Assessment:  Patient tolerated infusion without incident.  Blood return noted pre and post infusion.  Blood return noted during administration every 3-5 cc.  Site patent and intact, free from redness, edema or discomfort.  No evidence of extravasations.     Prior to discharge: Port is secured in place with tegaderm and flushed with 10cc NS with positive blood return noted.  Continuous home infusion Dosi-Fuser pump connected.    All connectors secured in place and clamps taped open.    Pump started, \"running\" noted on display (CADD): Not Applicable.  Pump Connection double checked with Rosa SHETTY.  Patient instructed to call our clinic or [facility] with any questions or concerns at home.  Patient verbalized understanding.    Patient set up for pump disconnect at [facility] on 11/3/22.        Discharge Plan:   Patient declined prescription refills.  Discharge instructions reviewed with: Patient.  Patient and/or family verbalized understanding of discharge instructions and all questions answered.  AVS to patient via TagruleT.  Patient will return 11/3/22 for next " appointment.   Patient discharged in stable condition accompanied by: self.  Departure Mode: Ambulatory.      Sai Gonzalez RN

## 2022-11-01 NOTE — PROGRESS NOTES
Nursing Note:  Noah Morrison presents today for port labs.    Patient seen by provider today: No   present during visit today: Not Applicable.    Note: N/A.    Intravenous Access:  Labs drawn without difficulty.  Implanted Port.    Discharge Plan:   Patient was sent to infusion for infusion appointment.    Regi Hoffman RN

## 2022-11-02 NOTE — PROGRESS NOTES
Writer spoke to Dr. Arce about Edilberto's plans to return to work. Dr. Arce advises that Edilberto is aware of the risks of being 2.5 hours away from a cancer center. Dr. Arce is also recommending that Edilberto follow with either our team or an oncologist in [place].     Edilberto reports that he would like to get back to work starting Monday 11/07/22. He is aware of all chemotherapy related side effects and risk for low blood counts. He states that he already has an established Oncologist that he will return to in [place].    Edilberto is requesting a medical clearance letter and Dr. Arce is agreeable.     Writer gave Edilberto and his sister Radha our medical records phone number. They verbalize appreciation and don't have any other questions at this time. Edilberto is welcome to return to clinic if he decides to move back to [place].     Louise Willis RN on 11/2/2022 at 3:23 PM

## 2022-11-02 NOTE — PROGRESS NOTES
Radha (sister) called in to clinic requesting to speak with DIOGENES Gil. She reports Edilberto is interested in going back to work and he would be leaving as soon as next Monday, 11/7. She is not sure when he would be able to return. She is requesting a call back at [phone] to discuss further.     Writer will route to Dr. Arce's RNCC for follow up.     ANURAG Vance, SEBASTIÁN, RN, LAc, OCN  RN Care Coordinator  [facility] [place]  Ph: [phone]  F: [phone]

## 2022-11-03 NOTE — PROGRESS NOTES
Infusion Nursing Note:  Noah Morrison presents today for Pump disconnect.    Patient seen by provider today: No   present during visit today: Not Applicable.    Note: Patient reports he is feeling well today.    Intravenous Access:  Implanted Port.    Treatment Conditions:  Not Applicable.    Post Infusion Assessment:  Patient tolerated infusion without incident.  Blood return noted post infusion.  Site patent and intact, free from redness, edema or discomfort.  No evidence of extravasations.  Access discontinued per protocol.     Discharge Plan:   AVS to patient via MYCHART.  Patient will return 11/16/22 for next appointment.   Patient discharged in stable condition accompanied by: self.  Departure Mode: Ambulatory.      Puja Hodgson RN

## 2023-01-01 ENCOUNTER — PATIENT OUTREACH (OUTPATIENT)
Dept: ONCOLOGY | Facility: CLINIC | Age: 58
End: 2023-01-01
Payer: COMMERCIAL

## 2023-01-01 ENCOUNTER — APPOINTMENT (OUTPATIENT)
Dept: CT IMAGING | Facility: CLINIC | Age: 58
DRG: 947 | End: 2023-01-01
Attending: EMERGENCY MEDICINE
Payer: COMMERCIAL

## 2023-01-01 ENCOUNTER — HOSPITAL ENCOUNTER (INPATIENT)
Facility: CLINIC | Age: 58
LOS: 2 days | Discharge: HOME | DRG: 947 | End: 2023-05-05
Attending: EMERGENCY MEDICINE | Admitting: INTERNAL MEDICINE
Payer: COMMERCIAL

## 2023-01-01 ENCOUNTER — HOSPITAL ENCOUNTER (INPATIENT)
Facility: CLINIC | Age: 58
LOS: 1 days | Discharge: HOSPICE | DRG: 181 | End: 2023-06-21
Attending: EMERGENCY MEDICINE | Admitting: HOSPITALIST
Payer: COMMERCIAL

## 2023-01-01 ENCOUNTER — TRANSFERRED RECORDS (OUTPATIENT)
Dept: HEALTH INFORMATION MANAGEMENT | Facility: CLINIC | Age: 58
End: 2023-01-01

## 2023-01-01 ENCOUNTER — APPOINTMENT (OUTPATIENT)
Dept: CT IMAGING | Facility: CLINIC | Age: 58
DRG: 181 | End: 2023-01-01
Attending: EMERGENCY MEDICINE
Payer: COMMERCIAL

## 2023-01-01 ENCOUNTER — TRANSFERRED RECORDS (OUTPATIENT)
Dept: HEALTH INFORMATION MANAGEMENT | Facility: CLINIC | Age: 58
End: 2023-01-01
Payer: COMMERCIAL

## 2023-01-01 ENCOUNTER — APPOINTMENT (OUTPATIENT)
Dept: ULTRASOUND IMAGING | Facility: CLINIC | Age: 58
DRG: 181 | End: 2023-01-01
Attending: EMERGENCY MEDICINE
Payer: COMMERCIAL

## 2023-01-01 ENCOUNTER — APPOINTMENT (OUTPATIENT)
Dept: ULTRASOUND IMAGING | Facility: CLINIC | Age: 58
DRG: 947 | End: 2023-01-01
Attending: EMERGENCY MEDICINE
Payer: COMMERCIAL

## 2023-01-01 ENCOUNTER — APPOINTMENT (OUTPATIENT)
Dept: GENERAL RADIOLOGY | Facility: CLINIC | Age: 58
DRG: 181 | End: 2023-01-01
Attending: PHYSICIAN ASSISTANT
Payer: COMMERCIAL

## 2023-01-01 ENCOUNTER — PATIENT OUTREACH (OUTPATIENT)
Dept: ONCOLOGY | Facility: CLINIC | Age: 58
End: 2023-01-01

## 2023-01-01 ENCOUNTER — PATIENT OUTREACH (OUTPATIENT)
Dept: CARE COORDINATION | Facility: CLINIC | Age: 58
End: 2023-01-01
Payer: COMMERCIAL

## 2023-01-01 ENCOUNTER — DOCUMENTATION ONLY (OUTPATIENT)
Dept: OTHER | Facility: CLINIC | Age: 58
End: 2023-01-01
Payer: COMMERCIAL

## 2023-01-01 VITALS
HEART RATE: 104 BPM | OXYGEN SATURATION: 95 % | HEIGHT: 68 IN | SYSTOLIC BLOOD PRESSURE: 112 MMHG | DIASTOLIC BLOOD PRESSURE: 72 MMHG | RESPIRATION RATE: 14 BRPM | WEIGHT: 128 LBS | TEMPERATURE: 98 F | BODY MASS INDEX: 19.4 KG/M2

## 2023-01-01 VITALS
TEMPERATURE: 98.5 F | HEIGHT: 68 IN | HEART RATE: 94 BPM | BODY MASS INDEX: 21.26 KG/M2 | OXYGEN SATURATION: 96 % | WEIGHT: 140.3 LBS | RESPIRATION RATE: 20 BRPM | SYSTOLIC BLOOD PRESSURE: 123 MMHG | DIASTOLIC BLOOD PRESSURE: 83 MMHG

## 2023-01-01 DIAGNOSIS — C78.89 METASTATIC ADENOCARCINOMA TO ESOPHAGUS (H): ICD-10-CM

## 2023-01-01 DIAGNOSIS — R10.84 ABDOMINAL PAIN, GENERALIZED: ICD-10-CM

## 2023-01-01 DIAGNOSIS — I82.C29 CHRONIC DEEP VEIN THROMBOSIS (DVT) OF INTERNAL JUGULAR VEIN (H): Primary | ICD-10-CM

## 2023-01-01 DIAGNOSIS — I82.622 ACUTE DEEP VEIN THROMBOSIS (DVT) OF LEFT UPPER EXTREMITY, UNSPECIFIED VEIN (H): ICD-10-CM

## 2023-01-01 DIAGNOSIS — Z51.5 PALLIATIVE CARE PATIENT: ICD-10-CM

## 2023-01-01 DIAGNOSIS — I82.A12 ACUTE DEEP VEIN THROMBOSIS (DVT) OF AXILLARY VEIN OF LEFT UPPER EXTREMITY (H): Primary | ICD-10-CM

## 2023-01-01 DIAGNOSIS — K31.1 GASTRIC OUTLET OBSTRUCTION: ICD-10-CM

## 2023-01-01 DIAGNOSIS — E86.0 DEHYDRATION: ICD-10-CM

## 2023-01-01 DIAGNOSIS — C15.9 ESOPHAGEAL ADENOCARCINOMA (H): Primary | ICD-10-CM

## 2023-01-01 DIAGNOSIS — Z51.5 HOSPICE CARE PATIENT: Primary | ICD-10-CM

## 2023-01-01 DIAGNOSIS — D72.829 LEUKOCYTOSIS, UNSPECIFIED TYPE: ICD-10-CM

## 2023-01-01 DIAGNOSIS — C78.89 METASTATIC SQUAMOUS CELL CARCINOMA TO ESOPHAGUS (H): ICD-10-CM

## 2023-01-01 LAB
ALBUMIN SERPL BCG-MCNC: 2.8 G/DL (ref 3.5–5.2)
ALBUMIN SERPL BCG-MCNC: 2.9 G/DL (ref 3.5–5.2)
ALBUMIN UR-MCNC: 30 MG/DL
ALP SERPL-CCNC: 148 U/L (ref 40–129)
ALP SERPL-CCNC: 151 U/L (ref 40–129)
ALT SERPL W P-5'-P-CCNC: 20 U/L (ref 10–50)
ALT SERPL W P-5'-P-CCNC: 20 U/L (ref 10–50)
ALT SERPL-CCNC: 21 U/L (ref 16–63)
ALT SERPL-CCNC: 22 U/L (ref 16–63)
ANION GAP SERPL CALCULATED.3IONS-SCNC: 10 MMOL/L (ref 7–15)
ANION GAP SERPL CALCULATED.3IONS-SCNC: 10 MMOL/L (ref 7–15)
ANION GAP SERPL CALCULATED.3IONS-SCNC: 11 MMOL/L (ref 7–15)
ANION GAP SERPL CALCULATED.3IONS-SCNC: 12 MMOL/L (ref 7–15)
ANION GAP SERPL CALCULATED.3IONS-SCNC: 9 MMOL/L (ref 7–15)
APPEARANCE UR: CLEAR
AST SERPL W P-5'-P-CCNC: 18 U/L (ref 10–50)
AST SERPL W P-5'-P-CCNC: 23 U/L (ref 10–50)
AST SERPL-CCNC: 12 U/L (ref 15–37)
AST SERPL-CCNC: 15 U/L (ref 15–37)
ATRIAL RATE - MUSE: 102 BPM
BACTERIA #/AREA URNS HPF: ABNORMAL /HPF
BACTERIA BLD CULT: NO GROWTH
BACTERIA BLD CULT: NO GROWTH
BASOPHILS # BLD AUTO: 0.1 10E3/UL (ref 0–0.2)
BASOPHILS # BLD AUTO: 0.1 10E3/UL (ref 0–0.2)
BASOPHILS NFR BLD AUTO: 0 %
BASOPHILS NFR BLD AUTO: 0 %
BILIRUB SERPL-MCNC: 0.3 MG/DL
BILIRUB SERPL-MCNC: 0.5 MG/DL
BILIRUB UR QL STRIP: NEGATIVE
BUN SERPL-MCNC: 11.5 MG/DL (ref 8–23)
BUN SERPL-MCNC: 4.4 MG/DL (ref 8–23)
BUN SERPL-MCNC: 6 MG/DL (ref 8–23)
BUN SERPL-MCNC: 8.5 MG/DL (ref 8–23)
BUN SERPL-MCNC: 8.7 MG/DL (ref 8–23)
CALCIUM SERPL-MCNC: 7.5 MG/DL (ref 8.8–10.2)
CALCIUM SERPL-MCNC: 7.8 MG/DL (ref 8.8–10.2)
CALCIUM SERPL-MCNC: 8 MG/DL (ref 8.8–10.2)
CALCIUM SERPL-MCNC: 8.2 MG/DL (ref 8.8–10.2)
CALCIUM SERPL-MCNC: 8.6 MG/DL (ref 8.8–10.2)
CEA SERPL-MCNC: 9.4 NG/ML
CHLORIDE SERPL-SCNC: 100 MMOL/L (ref 98–107)
CHLORIDE SERPL-SCNC: 104 MMOL/L (ref 98–107)
CHLORIDE SERPL-SCNC: 97 MMOL/L (ref 98–107)
CHLORIDE SERPL-SCNC: 99 MMOL/L (ref 98–107)
CHLORIDE SERPL-SCNC: 99 MMOL/L (ref 98–107)
COLOR UR AUTO: YELLOW
CREAT SERPL-MCNC: 0.52 MG/DL (ref 0.67–1.17)
CREAT SERPL-MCNC: 0.61 MG/DL (ref 0.67–1.17)
CREAT SERPL-MCNC: 0.66 MG/DL (ref 0.67–1.17)
CREAT SERPL-MCNC: 0.66 MG/DL (ref 0.67–1.17)
CREAT SERPL-MCNC: 0.76 MG/DL (ref 0.67–1.17)
CREATININE (EXTERNAL): 0.96 MG/DL (ref 0.7–1.3)
CREATININE (EXTERNAL): 1.06 MG/DL (ref 0.7–1.3)
DEPRECATED HCO3 PLAS-SCNC: 21 MMOL/L (ref 22–29)
DEPRECATED HCO3 PLAS-SCNC: 22 MMOL/L (ref 22–29)
DEPRECATED HCO3 PLAS-SCNC: 23 MMOL/L (ref 22–29)
DEPRECATED HCO3 PLAS-SCNC: 24 MMOL/L (ref 22–29)
DEPRECATED HCO3 PLAS-SCNC: 25 MMOL/L (ref 22–29)
DIASTOLIC BLOOD PRESSURE - MUSE: NORMAL MMHG
EOSINOPHIL # BLD AUTO: 0 10E3/UL (ref 0–0.7)
EOSINOPHIL # BLD AUTO: 0.1 10E3/UL (ref 0–0.7)
EOSINOPHIL NFR BLD AUTO: 0 %
EOSINOPHIL NFR BLD AUTO: 1 %
ERYTHROCYTE [DISTWIDTH] IN BLOOD BY AUTOMATED COUNT: 15.2 % (ref 10–15)
ERYTHROCYTE [DISTWIDTH] IN BLOOD BY AUTOMATED COUNT: 15.3 % (ref 10–15)
ERYTHROCYTE [DISTWIDTH] IN BLOOD BY AUTOMATED COUNT: 17.6 % (ref 10–15)
ERYTHROCYTE [DISTWIDTH] IN BLOOD BY AUTOMATED COUNT: 17.8 % (ref 10–15)
ERYTHROCYTE [DISTWIDTH] IN BLOOD BY AUTOMATED COUNT: 18 % (ref 10–15)
ERYTHROCYTE [DISTWIDTH] IN BLOOD BY AUTOMATED COUNT: 18.1 % (ref 10–15)
GFR ESTIMATED (EXTERNAL): 72 ML/MIN/1.73M2 (ref 60–137)
GFR ESTIMATED (EXTERNAL): 80 ML/MIN/1.73M2 (ref 60–137)
GFR ESTIMATED (IF AFRICAN AMERICAN) (EXTERNAL): 87 ML/MIN/1.73M2 (ref 60–137)
GFR ESTIMATED (IF AFRICAN AMERICAN) (EXTERNAL): 97 ML/MIN/1.73M2 (ref 60–137)
GFR SERPL CREATININE-BSD FRML MDRD: >90 ML/MIN/1.73M2
GLUCOSE (EXTERNAL): 80 MG/DL (ref 74–106)
GLUCOSE (EXTERNAL): 96 MG/DL (ref 74–106)
GLUCOSE SERPL-MCNC: 107 MG/DL (ref 70–99)
GLUCOSE SERPL-MCNC: 122 MG/DL (ref 70–99)
GLUCOSE SERPL-MCNC: 124 MG/DL (ref 70–99)
GLUCOSE UR STRIP-MCNC: NEGATIVE MG/DL
HCO3 BLDV-SCNC: 24 MMOL/L (ref 21–28)
HCT VFR BLD AUTO: 27.8 % (ref 40–53)
HCT VFR BLD AUTO: 29.2 % (ref 40–53)
HCT VFR BLD AUTO: 30.6 % (ref 40–53)
HCT VFR BLD AUTO: 32.5 % (ref 40–53)
HCT VFR BLD AUTO: 34.6 % (ref 40–53)
HCT VFR BLD AUTO: 38.2 % (ref 40–53)
HGB BLD-MCNC: 10.1 G/DL (ref 13.3–17.7)
HGB BLD-MCNC: 10.9 G/DL (ref 13.3–17.7)
HGB BLD-MCNC: 12.2 G/DL (ref 13.3–17.7)
HGB BLD-MCNC: 8.4 G/DL (ref 13.3–17.7)
HGB BLD-MCNC: 9.1 G/DL (ref 13.3–17.7)
HGB BLD-MCNC: 9.5 G/DL (ref 13.3–17.7)
HGB UR QL STRIP: NEGATIVE
HOLD SPECIMEN: NORMAL
HOLD SPECIMEN: NORMAL
IMM GRANULOCYTES # BLD: 0.2 10E3/UL
IMM GRANULOCYTES # BLD: 0.3 10E3/UL
IMM GRANULOCYTES NFR BLD: 1 %
IMM GRANULOCYTES NFR BLD: 1 %
INTERPRETATION ECG - MUSE: NORMAL
KETONES UR STRIP-MCNC: NEGATIVE MG/DL
LACTATE BLD-SCNC: 1.1 MMOL/L
LEUKOCYTE ESTERASE UR QL STRIP: NEGATIVE
LIPASE SERPL-CCNC: 763 U/L (ref 13–60)
LIPASE SERPL-CCNC: 862 U/L (ref 13–60)
LYMPHOCYTES # BLD AUTO: 0.7 10E3/UL (ref 0.8–5.3)
LYMPHOCYTES # BLD AUTO: 0.7 10E3/UL (ref 0.8–5.3)
LYMPHOCYTES NFR BLD AUTO: 3 %
LYMPHOCYTES NFR BLD AUTO: 4 %
MAGNESIUM SERPL-MCNC: 1.8 MG/DL (ref 1.7–2.3)
MAGNESIUM SERPL-MCNC: 2.2 MG/DL (ref 1.7–2.3)
MCH RBC QN AUTO: 29.1 PG (ref 26.5–33)
MCH RBC QN AUTO: 29.4 PG (ref 26.5–33)
MCH RBC QN AUTO: 29.6 PG (ref 26.5–33)
MCH RBC QN AUTO: 29.7 PG (ref 26.5–33)
MCH RBC QN AUTO: 30.2 PG (ref 26.5–33)
MCH RBC QN AUTO: 30.6 PG (ref 26.5–33)
MCHC RBC AUTO-ENTMCNC: 30.2 G/DL (ref 31.5–36.5)
MCHC RBC AUTO-ENTMCNC: 31 G/DL (ref 31.5–36.5)
MCHC RBC AUTO-ENTMCNC: 31.1 G/DL (ref 31.5–36.5)
MCHC RBC AUTO-ENTMCNC: 31.2 G/DL (ref 31.5–36.5)
MCHC RBC AUTO-ENTMCNC: 31.5 G/DL (ref 31.5–36.5)
MCHC RBC AUTO-ENTMCNC: 31.9 G/DL (ref 31.5–36.5)
MCV RBC AUTO: 95 FL (ref 78–100)
MCV RBC AUTO: 96 FL (ref 78–100)
MONOCYTES # BLD AUTO: 1.1 10E3/UL (ref 0–1.3)
MONOCYTES # BLD AUTO: 1.3 10E3/UL (ref 0–1.3)
MONOCYTES NFR BLD AUTO: 5 %
MONOCYTES NFR BLD AUTO: 7 %
MUCOUS THREADS #/AREA URNS LPF: PRESENT /LPF
NEUTROPHILS # BLD AUTO: 14.4 10E3/UL (ref 1.6–8.3)
NEUTROPHILS # BLD AUTO: 22.2 10E3/UL (ref 1.6–8.3)
NEUTROPHILS NFR BLD AUTO: 87 %
NEUTROPHILS NFR BLD AUTO: 91 %
NITRATE UR QL: NEGATIVE
NRBC # BLD AUTO: 0 10E3/UL
NRBC # BLD AUTO: 0 10E3/UL
NRBC BLD AUTO-RTO: 0 /100
NRBC BLD AUTO-RTO: 0 /100
P AXIS - MUSE: 53 DEGREES
PCO2 BLDV: 38 MM HG (ref 40–50)
PH BLDV: 7.41 [PH] (ref 7.32–7.43)
PH UR STRIP: 5.5 [PH] (ref 5–7)
PHOSPHATE SERPL-MCNC: 3.3 MG/DL (ref 2.5–4.5)
PLAT MORPH BLD: NORMAL
PLATELET # BLD AUTO: 315 10E3/UL (ref 150–450)
PLATELET # BLD AUTO: 320 10E3/UL (ref 150–450)
PLATELET # BLD AUTO: 331 10E3/UL (ref 150–450)
PLATELET # BLD AUTO: 373 10E3/UL (ref 150–450)
PLATELET # BLD AUTO: 375 10E3/UL (ref 150–450)
PLATELET # BLD AUTO: 379 10E3/UL (ref 150–450)
PO2 BLDV: 30 MM HG (ref 25–47)
POTASSIUM (EXTERNAL): 4 MMOL/L (ref 3.5–5.1)
POTASSIUM (EXTERNAL): 4.5 MMOL/L (ref 3.5–5.1)
POTASSIUM SERPL-SCNC: 3.6 MMOL/L (ref 3.4–5.3)
POTASSIUM SERPL-SCNC: 3.7 MMOL/L (ref 3.4–5.3)
POTASSIUM SERPL-SCNC: 3.8 MMOL/L (ref 3.4–5.3)
POTASSIUM SERPL-SCNC: 3.9 MMOL/L (ref 3.4–5.3)
POTASSIUM SERPL-SCNC: 4 MMOL/L (ref 3.4–5.3)
PR INTERVAL - MUSE: 132 MS
PROT SERPL-MCNC: 6 G/DL (ref 6.4–8.3)
PROT SERPL-MCNC: 6.4 G/DL (ref 6.4–8.3)
QRS DURATION - MUSE: 84 MS
QT - MUSE: 420 MS
QTC - MUSE: 547 MS
R AXIS - MUSE: 79 DEGREES
RBC # BLD AUTO: 2.89 10E6/UL (ref 4.4–5.9)
RBC # BLD AUTO: 3.06 10E6/UL (ref 4.4–5.9)
RBC # BLD AUTO: 3.21 10E6/UL (ref 4.4–5.9)
RBC # BLD AUTO: 3.44 10E6/UL (ref 4.4–5.9)
RBC # BLD AUTO: 3.61 10E6/UL (ref 4.4–5.9)
RBC # BLD AUTO: 3.99 10E6/UL (ref 4.4–5.9)
RBC MORPH BLD: NORMAL
RBC URINE: 1 /HPF
SAO2 % BLDV: 58 % (ref 94–100)
SODIUM SERPL-SCNC: 132 MMOL/L (ref 136–145)
SODIUM SERPL-SCNC: 133 MMOL/L (ref 136–145)
SODIUM SERPL-SCNC: 137 MMOL/L (ref 136–145)
SP GR UR STRIP: 1.01 (ref 1–1.03)
SQUAMOUS EPITHELIAL: 1 /HPF
SYSTOLIC BLOOD PRESSURE - MUSE: NORMAL MMHG
T AXIS - MUSE: 42 DEGREES
TSH SERPL-ACNC: 0.45 UIU/ML (ref 0.36–3.74)
TSH SERPL-ACNC: 1.04 UIU/ML (ref 0.36–3.74)
UFH PPP CHRO-ACNC: 0.29 IU/ML
UFH PPP CHRO-ACNC: 0.35 IU/ML
UFH PPP CHRO-ACNC: 0.57 IU/ML
UFH PPP CHRO-ACNC: 0.61 IU/ML
UROBILINOGEN UR STRIP-MCNC: NORMAL MG/DL
VENTRICULAR RATE- MUSE: 102 BPM
WBC # BLD AUTO: 14.5 10E3/UL (ref 4–11)
WBC # BLD AUTO: 16.5 10E3/UL (ref 4–11)
WBC # BLD AUTO: 16.7 10E3/UL (ref 4–11)
WBC # BLD AUTO: 18 10E3/UL (ref 4–11)
WBC # BLD AUTO: 24.7 10E3/UL (ref 4–11)
WBC # BLD AUTO: 26.1 10E3/UL (ref 4–11)
WBC URINE: 1 /HPF

## 2023-01-01 PROCEDURE — 96361 HYDRATE IV INFUSION ADD-ON: CPT

## 2023-01-01 PROCEDURE — 99497 ADVNCD CARE PLAN 30 MIN: CPT | Performed by: NURSE PRACTITIONER

## 2023-01-01 PROCEDURE — 82378 CARCINOEMBRYONIC ANTIGEN: CPT | Performed by: INTERNAL MEDICINE

## 2023-01-01 PROCEDURE — 250N000011 HC RX IP 250 OP 636: Performed by: PHYSICIAN ASSISTANT

## 2023-01-01 PROCEDURE — 99223 1ST HOSP IP/OBS HIGH 75: CPT | Performed by: INTERNAL MEDICINE

## 2023-01-01 PROCEDURE — 99232 SBSQ HOSP IP/OBS MODERATE 35: CPT | Performed by: INTERNAL MEDICINE

## 2023-01-01 PROCEDURE — 83690 ASSAY OF LIPASE: CPT | Performed by: EMERGENCY MEDICINE

## 2023-01-01 PROCEDURE — 99285 EMERGENCY DEPT VISIT HI MDM: CPT | Mod: 25

## 2023-01-01 PROCEDURE — 258N000003 HC RX IP 258 OP 636: Performed by: INTERNAL MEDICINE

## 2023-01-01 PROCEDURE — 120N000001 HC R&B MED SURG/OB

## 2023-01-01 PROCEDURE — 258N000003 HC RX IP 258 OP 636: Performed by: HOSPITALIST

## 2023-01-01 PROCEDURE — 250N000013 HC RX MED GY IP 250 OP 250 PS 637: Performed by: NURSE PRACTITIONER

## 2023-01-01 PROCEDURE — 71275 CT ANGIOGRAPHY CHEST: CPT

## 2023-01-01 PROCEDURE — 96376 TX/PRO/DX INJ SAME DRUG ADON: CPT

## 2023-01-01 PROCEDURE — 250N000013 HC RX MED GY IP 250 OP 250 PS 637: Performed by: STUDENT IN AN ORGANIZED HEALTH CARE EDUCATION/TRAINING PROGRAM

## 2023-01-01 PROCEDURE — 99239 HOSP IP/OBS DSCHRG MGMT >30: CPT | Performed by: PHYSICIAN ASSISTANT

## 2023-01-01 PROCEDURE — 250N000011 HC RX IP 250 OP 636: Performed by: EMERGENCY MEDICINE

## 2023-01-01 PROCEDURE — 80053 COMPREHEN METABOLIC PANEL: CPT | Performed by: INTERNAL MEDICINE

## 2023-01-01 PROCEDURE — 80048 BASIC METABOLIC PNL TOTAL CA: CPT | Performed by: PHYSICIAN ASSISTANT

## 2023-01-01 PROCEDURE — 96366 THER/PROPH/DIAG IV INF ADDON: CPT

## 2023-01-01 PROCEDURE — 70498 CT ANGIOGRAPHY NECK: CPT

## 2023-01-01 PROCEDURE — 74018 RADEX ABDOMEN 1 VIEW: CPT

## 2023-01-01 PROCEDURE — 85025 COMPLETE CBC W/AUTO DIFF WBC: CPT | Performed by: EMERGENCY MEDICINE

## 2023-01-01 PROCEDURE — 250N000011 HC RX IP 250 OP 636: Performed by: INTERNAL MEDICINE

## 2023-01-01 PROCEDURE — 82310 ASSAY OF CALCIUM: CPT | Performed by: EMERGENCY MEDICINE

## 2023-01-01 PROCEDURE — 85027 COMPLETE CBC AUTOMATED: CPT | Performed by: INTERNAL MEDICINE

## 2023-01-01 PROCEDURE — C9113 INJ PANTOPRAZOLE SODIUM, VIA: HCPCS | Mod: JZ | Performed by: HOSPITALIST

## 2023-01-01 PROCEDURE — 82310 ASSAY OF CALCIUM: CPT | Performed by: HOSPITALIST

## 2023-01-01 PROCEDURE — 74177 CT ABD & PELVIS W/CONTRAST: CPT

## 2023-01-01 PROCEDURE — 250N000009 HC RX 250: Performed by: EMERGENCY MEDICINE

## 2023-01-01 PROCEDURE — 99254 IP/OBS CNSLTJ NEW/EST MOD 60: CPT | Performed by: NURSE PRACTITIONER

## 2023-01-01 PROCEDURE — 99233 SBSQ HOSP IP/OBS HIGH 50: CPT | Performed by: STUDENT IN AN ORGANIZED HEALTH CARE EDUCATION/TRAINING PROGRAM

## 2023-01-01 PROCEDURE — 250N000013 HC RX MED GY IP 250 OP 250 PS 637: Performed by: PHYSICIAN ASSISTANT

## 2023-01-01 PROCEDURE — 84100 ASSAY OF PHOSPHORUS: CPT | Performed by: STUDENT IN AN ORGANIZED HEALTH CARE EDUCATION/TRAINING PROGRAM

## 2023-01-01 PROCEDURE — 83735 ASSAY OF MAGNESIUM: CPT | Performed by: EMERGENCY MEDICINE

## 2023-01-01 PROCEDURE — 87040 BLOOD CULTURE FOR BACTERIA: CPT | Performed by: EMERGENCY MEDICINE

## 2023-01-01 PROCEDURE — 93005 ELECTROCARDIOGRAM TRACING: CPT

## 2023-01-01 PROCEDURE — 85027 COMPLETE CBC AUTOMATED: CPT | Performed by: PHYSICIAN ASSISTANT

## 2023-01-01 PROCEDURE — 96365 THER/PROPH/DIAG IV INF INIT: CPT | Mod: 59

## 2023-01-01 PROCEDURE — 96375 TX/PRO/DX INJ NEW DRUG ADDON: CPT

## 2023-01-01 PROCEDURE — 85520 HEPARIN ASSAY: CPT | Performed by: INTERNAL MEDICINE

## 2023-01-01 PROCEDURE — 250N000011 HC RX IP 250 OP 636: Performed by: HOSPITALIST

## 2023-01-01 PROCEDURE — 99233 SBSQ HOSP IP/OBS HIGH 50: CPT | Mod: 25 | Performed by: NURSE PRACTITIONER

## 2023-01-01 PROCEDURE — 258N000003 HC RX IP 258 OP 636: Performed by: EMERGENCY MEDICINE

## 2023-01-01 PROCEDURE — 999N000065 XR ABDOMEN PORT 1 VIEW

## 2023-01-01 PROCEDURE — 85520 HEPARIN ASSAY: CPT | Performed by: STUDENT IN AN ORGANIZED HEALTH CARE EDUCATION/TRAINING PROGRAM

## 2023-01-01 PROCEDURE — 99223 1ST HOSP IP/OBS HIGH 75: CPT | Mod: AI | Performed by: HOSPITALIST

## 2023-01-01 PROCEDURE — 85027 COMPLETE CBC AUTOMATED: CPT | Performed by: EMERGENCY MEDICINE

## 2023-01-01 PROCEDURE — 83735 ASSAY OF MAGNESIUM: CPT | Performed by: STUDENT IN AN ORGANIZED HEALTH CARE EDUCATION/TRAINING PROGRAM

## 2023-01-01 PROCEDURE — 99239 HOSP IP/OBS DSCHRG MGMT >30: CPT | Performed by: STUDENT IN AN ORGANIZED HEALTH CARE EDUCATION/TRAINING PROGRAM

## 2023-01-01 PROCEDURE — 250N000011 HC RX IP 250 OP 636: Mod: JZ | Performed by: HOSPITALIST

## 2023-01-01 PROCEDURE — 96372 THER/PROPH/DIAG INJ SC/IM: CPT | Performed by: HOSPITALIST

## 2023-01-01 PROCEDURE — 36415 COLL VENOUS BLD VENIPUNCTURE: CPT | Performed by: EMERGENCY MEDICINE

## 2023-01-01 PROCEDURE — 93971 EXTREMITY STUDY: CPT | Mod: LT

## 2023-01-01 PROCEDURE — 36415 COLL VENOUS BLD VENIPUNCTURE: CPT | Performed by: HOSPITALIST

## 2023-01-01 PROCEDURE — 250N000009 HC RX 250: Performed by: PHYSICIAN ASSISTANT

## 2023-01-01 PROCEDURE — C9113 INJ PANTOPRAZOLE SODIUM, VIA: HCPCS | Performed by: HOSPITALIST

## 2023-01-01 PROCEDURE — 82803 BLOOD GASES ANY COMBINATION: CPT

## 2023-01-01 PROCEDURE — G0378 HOSPITAL OBSERVATION PER HR: HCPCS

## 2023-01-01 PROCEDURE — 99223 1ST HOSP IP/OBS HIGH 75: CPT | Mod: AI | Performed by: INTERNAL MEDICINE

## 2023-01-01 PROCEDURE — 96374 THER/PROPH/DIAG INJ IV PUSH: CPT | Mod: 59

## 2023-01-01 PROCEDURE — 80053 COMPREHEN METABOLIC PANEL: CPT | Performed by: EMERGENCY MEDICINE

## 2023-01-01 PROCEDURE — 250N000011 HC RX IP 250 OP 636: Performed by: STUDENT IN AN ORGANIZED HEALTH CARE EDUCATION/TRAINING PROGRAM

## 2023-01-01 PROCEDURE — 72125 CT NECK SPINE W/O DYE: CPT

## 2023-01-01 PROCEDURE — 36415 COLL VENOUS BLD VENIPUNCTURE: CPT | Performed by: PHYSICIAN ASSISTANT

## 2023-01-01 PROCEDURE — 81003 URINALYSIS AUTO W/O SCOPE: CPT | Performed by: EMERGENCY MEDICINE

## 2023-01-01 PROCEDURE — 99233 SBSQ HOSP IP/OBS HIGH 50: CPT | Performed by: PHYSICIAN ASSISTANT

## 2023-01-01 PROCEDURE — 85027 COMPLETE CBC AUTOMATED: CPT | Performed by: HOSPITALIST

## 2023-01-01 PROCEDURE — 83690 ASSAY OF LIPASE: CPT | Performed by: INTERNAL MEDICINE

## 2023-01-01 PROCEDURE — 99498 ADVNCD CARE PLAN ADDL 30 MIN: CPT | Performed by: NURSE PRACTITIONER

## 2023-01-01 PROCEDURE — 70491 CT SOFT TISSUE NECK W/DYE: CPT

## 2023-01-01 PROCEDURE — 36415 COLL VENOUS BLD VENIPUNCTURE: CPT | Performed by: INTERNAL MEDICINE

## 2023-01-01 RX ORDER — ACETAMINOPHEN 325 MG/1
650 TABLET ORAL EVERY 8 HOURS
Status: DISCONTINUED | OUTPATIENT
Start: 2023-01-01 | End: 2023-01-01 | Stop reason: HOSPADM

## 2023-01-01 RX ORDER — NALOXONE HYDROCHLORIDE 0.4 MG/ML
0.2 INJECTION, SOLUTION INTRAMUSCULAR; INTRAVENOUS; SUBCUTANEOUS
Status: DISCONTINUED | OUTPATIENT
Start: 2023-01-01 | End: 2023-01-01 | Stop reason: HOSPADM

## 2023-01-01 RX ORDER — PROCHLORPERAZINE 25 MG
25 SUPPOSITORY, RECTAL RECTAL EVERY 12 HOURS PRN
Status: DISCONTINUED | OUTPATIENT
Start: 2023-01-01 | End: 2023-01-01 | Stop reason: HOSPADM

## 2023-01-01 RX ORDER — ONDANSETRON 4 MG/1
4 TABLET, ORALLY DISINTEGRATING ORAL EVERY 6 HOURS PRN
Status: DISCONTINUED | OUTPATIENT
Start: 2023-01-01 | End: 2023-01-01 | Stop reason: HOSPADM

## 2023-01-01 RX ORDER — LORAZEPAM 2 MG/ML
1 INJECTION INTRAMUSCULAR EVERY 6 HOURS PRN
Status: COMPLETED | OUTPATIENT
Start: 2023-01-01 | End: 2023-01-01

## 2023-01-01 RX ORDER — MORPHINE SULFATE 10 MG/5ML
10 SOLUTION ORAL
Status: DISCONTINUED | OUTPATIENT
Start: 2023-01-01 | End: 2023-01-01 | Stop reason: HOSPADM

## 2023-01-01 RX ORDER — HYDROMORPHONE HYDROCHLORIDE 4 MG/1
4 TABLET ORAL
Status: DISCONTINUED | OUTPATIENT
Start: 2023-01-01 | End: 2023-01-01

## 2023-01-01 RX ORDER — LIDOCAINE 40 MG/G
CREAM TOPICAL
Status: DISCONTINUED | OUTPATIENT
Start: 2023-01-01 | End: 2023-01-01 | Stop reason: HOSPADM

## 2023-01-01 RX ORDER — POLYETHYLENE GLYCOL 3350 17 G/17G
17 POWDER, FOR SOLUTION ORAL DAILY
Qty: 510 G | Refills: 0 | Status: SHIPPED | OUTPATIENT
Start: 2023-01-01 | End: 2023-01-01

## 2023-01-01 RX ORDER — FENTANYL 12.5 UG/1
12 PATCH TRANSDERMAL
Status: DISCONTINUED | OUTPATIENT
Start: 2023-01-01 | End: 2023-01-01

## 2023-01-01 RX ORDER — NALOXONE HYDROCHLORIDE 0.4 MG/ML
0.4 INJECTION, SOLUTION INTRAMUSCULAR; INTRAVENOUS; SUBCUTANEOUS
Status: DISCONTINUED | OUTPATIENT
Start: 2023-01-01 | End: 2023-01-01 | Stop reason: HOSPADM

## 2023-01-01 RX ORDER — HYDROXYZINE HYDROCHLORIDE 25 MG/1
25-50 TABLET, FILM COATED ORAL 3 TIMES DAILY PRN
Status: DISCONTINUED | OUTPATIENT
Start: 2023-01-01 | End: 2023-01-01 | Stop reason: HOSPADM

## 2023-01-01 RX ORDER — AMPICILLIN AND SULBACTAM 2; 1 G/1; G/1
3 INJECTION, POWDER, FOR SOLUTION INTRAMUSCULAR; INTRAVENOUS EVERY 6 HOURS
Status: DISCONTINUED | OUTPATIENT
Start: 2023-01-01 | End: 2023-01-01 | Stop reason: HOSPADM

## 2023-01-01 RX ORDER — HYDROMORPHONE HYDROCHLORIDE 1 MG/ML
2 SOLUTION ORAL
Qty: 96 ML | Refills: 0 | Status: SHIPPED | OUTPATIENT
Start: 2023-01-01

## 2023-01-01 RX ORDER — PROCHLORPERAZINE MALEATE 5 MG
10 TABLET ORAL EVERY 6 HOURS PRN
Status: DISCONTINUED | OUTPATIENT
Start: 2023-01-01 | End: 2023-01-01 | Stop reason: HOSPADM

## 2023-01-01 RX ORDER — IOPAMIDOL 755 MG/ML
500 INJECTION, SOLUTION INTRAVASCULAR ONCE
Status: COMPLETED | OUTPATIENT
Start: 2023-01-01 | End: 2023-01-01

## 2023-01-01 RX ORDER — HYDROMORPHONE HYDROCHLORIDE 4 MG/1
4 TABLET ORAL
Qty: 30 TABLET | Refills: 0 | Status: SHIPPED | OUTPATIENT
Start: 2023-01-01 | End: 2023-01-01

## 2023-01-01 RX ORDER — GABAPENTIN 100 MG/1
200 CAPSULE ORAL 3 TIMES DAILY
Status: DISCONTINUED | OUTPATIENT
Start: 2023-01-01 | End: 2023-01-01 | Stop reason: HOSPADM

## 2023-01-01 RX ORDER — MEGESTROL ACETATE 40 MG/ML
800 SUSPENSION ORAL DAILY
Status: ON HOLD | COMMUNITY
End: 2023-01-01

## 2023-01-01 RX ORDER — ONDANSETRON 4 MG/1
4 TABLET, ORALLY DISINTEGRATING ORAL EVERY 6 HOURS PRN
Qty: 16 TABLET | Refills: 0 | Status: SHIPPED | OUTPATIENT
Start: 2023-01-01

## 2023-01-01 RX ORDER — MULTIVITAMIN,THERAPEUTIC
1 TABLET ORAL DAILY
Status: DISCONTINUED | OUTPATIENT
Start: 2023-01-01 | End: 2023-01-01 | Stop reason: HOSPADM

## 2023-01-01 RX ORDER — AMOXICILLIN 250 MG
1-2 CAPSULE ORAL DAILY PRN
Status: DISCONTINUED | OUTPATIENT
Start: 2023-01-01 | End: 2023-01-01 | Stop reason: HOSPADM

## 2023-01-01 RX ORDER — AMOXICILLIN 250 MG
2 CAPSULE ORAL 2 TIMES DAILY
Status: DISCONTINUED | OUTPATIENT
Start: 2023-01-01 | End: 2023-01-01 | Stop reason: HOSPADM

## 2023-01-01 RX ORDER — AMOXICILLIN 250 MG
1 CAPSULE ORAL 2 TIMES DAILY
Status: DISCONTINUED | OUTPATIENT
Start: 2023-01-01 | End: 2023-01-01 | Stop reason: HOSPADM

## 2023-01-01 RX ORDER — APIXABAN 5 MG (74)
KIT ORAL
Qty: 74 EACH | Refills: 0 | Status: SHIPPED | OUTPATIENT
Start: 2023-01-01 | End: 2023-01-01

## 2023-01-01 RX ORDER — ONDANSETRON 2 MG/ML
4 INJECTION INTRAMUSCULAR; INTRAVENOUS EVERY 30 MIN PRN
Status: DISCONTINUED | OUTPATIENT
Start: 2023-01-01 | End: 2023-01-01

## 2023-01-01 RX ORDER — HEPARIN SODIUM,PORCINE 10 UNIT/ML
5-10 VIAL (ML) INTRAVENOUS EVERY 24 HOURS
Status: DISCONTINUED | OUTPATIENT
Start: 2023-01-01 | End: 2023-01-01 | Stop reason: HOSPADM

## 2023-01-01 RX ORDER — OXYCODONE HYDROCHLORIDE 5 MG/1
TABLET ORAL
Status: ON HOLD | COMMUNITY
Start: 2023-01-01 | End: 2023-01-01

## 2023-01-01 RX ORDER — HYDROXYZINE HYDROCHLORIDE 25 MG/1
25 TABLET, FILM COATED ORAL EVERY 6 HOURS
Status: DISCONTINUED | OUTPATIENT
Start: 2023-01-01 | End: 2023-01-01 | Stop reason: HOSPADM

## 2023-01-01 RX ORDER — HYDROMORPHONE HYDROCHLORIDE 1 MG/ML
0.3 INJECTION, SOLUTION INTRAMUSCULAR; INTRAVENOUS; SUBCUTANEOUS
Status: DISCONTINUED | OUTPATIENT
Start: 2023-01-01 | End: 2023-01-01

## 2023-01-01 RX ORDER — GABAPENTIN 100 MG/1
200 CAPSULE ORAL 3 TIMES DAILY
Qty: 180 CAPSULE | Refills: 0 | Status: SHIPPED | OUTPATIENT
Start: 2023-01-01

## 2023-01-01 RX ORDER — SCOLOPAMINE TRANSDERMAL SYSTEM 1 MG/1
1 PATCH, EXTENDED RELEASE TRANSDERMAL
Status: DISCONTINUED | OUTPATIENT
Start: 2023-01-01 | End: 2023-01-01 | Stop reason: HOSPADM

## 2023-01-01 RX ORDER — ONDANSETRON 4 MG/1
4 TABLET, ORALLY DISINTEGRATING ORAL EVERY 6 HOURS PRN
Qty: 30 TABLET | Refills: 0 | Status: ON HOLD | OUTPATIENT
Start: 2023-01-01 | End: 2023-01-01

## 2023-01-01 RX ORDER — HYDROMORPHONE HCL IN WATER/PF 6 MG/30 ML
0.2 PATIENT CONTROLLED ANALGESIA SYRINGE INTRAVENOUS
Status: DISCONTINUED | OUTPATIENT
Start: 2023-01-01 | End: 2023-01-01

## 2023-01-01 RX ORDER — ONDANSETRON 2 MG/ML
4 INJECTION INTRAMUSCULAR; INTRAVENOUS EVERY 6 HOURS PRN
Status: DISCONTINUED | OUTPATIENT
Start: 2023-01-01 | End: 2023-01-01 | Stop reason: HOSPADM

## 2023-01-01 RX ORDER — OXYCODONE HYDROCHLORIDE 5 MG/1
5 TABLET ORAL EVERY 8 HOURS PRN
Status: DISCONTINUED | OUTPATIENT
Start: 2023-01-01 | End: 2023-01-01

## 2023-01-01 RX ORDER — KETOROLAC TROMETHAMINE 30 MG/ML
30 INJECTION, SOLUTION INTRAMUSCULAR; INTRAVENOUS
Status: DISCONTINUED | OUTPATIENT
Start: 2023-01-01 | End: 2023-01-01

## 2023-01-01 RX ORDER — MORPHINE SULFATE 4 MG/ML
4 INJECTION, SOLUTION INTRAMUSCULAR; INTRAVENOUS
Status: DISCONTINUED | OUTPATIENT
Start: 2023-01-01 | End: 2023-01-01

## 2023-01-01 RX ORDER — HYDROXYZINE HYDROCHLORIDE 50 MG/1
50 TABLET, FILM COATED ORAL EVERY 6 HOURS
Status: DISCONTINUED | OUTPATIENT
Start: 2023-01-01 | End: 2023-01-01 | Stop reason: HOSPADM

## 2023-01-01 RX ORDER — HYDROMORPHONE HYDROCHLORIDE 4 MG/1
4 TABLET ORAL
Status: DISCONTINUED | OUTPATIENT
Start: 2023-01-01 | End: 2023-01-01 | Stop reason: HOSPADM

## 2023-01-01 RX ORDER — HEPARIN SODIUM 10000 [USP'U]/100ML
0-5000 INJECTION, SOLUTION INTRAVENOUS CONTINUOUS
Status: DISCONTINUED | OUTPATIENT
Start: 2023-01-01 | End: 2023-01-01

## 2023-01-01 RX ORDER — HYDROXYZINE HYDROCHLORIDE 25 MG/1
25-50 TABLET, FILM COATED ORAL 3 TIMES DAILY PRN
Qty: 60 TABLET | Refills: 0 | Status: SHIPPED | OUTPATIENT
Start: 2023-01-01

## 2023-01-01 RX ORDER — HYDROMORPHONE HCL IN WATER/PF 6 MG/30 ML
0.2 PATIENT CONTROLLED ANALGESIA SYRINGE INTRAVENOUS EVERY 4 HOURS PRN
Status: DISCONTINUED | OUTPATIENT
Start: 2023-01-01 | End: 2023-01-01 | Stop reason: HOSPADM

## 2023-01-01 RX ORDER — HYDROMORPHONE HYDROCHLORIDE 1 MG/ML
0.3 INJECTION, SOLUTION INTRAMUSCULAR; INTRAVENOUS; SUBCUTANEOUS
Status: DISCONTINUED | OUTPATIENT
Start: 2023-01-01 | End: 2023-01-01 | Stop reason: HOSPADM

## 2023-01-01 RX ORDER — OXYCODONE HYDROCHLORIDE 10 MG/1
5 TABLET ORAL EVERY 8 HOURS PRN
Status: ON HOLD | COMMUNITY
End: 2023-01-01

## 2023-01-01 RX ORDER — DEXTROSE MONOHYDRATE, SODIUM CHLORIDE, AND POTASSIUM CHLORIDE 50; 1.49; 4.5 G/1000ML; G/1000ML; G/1000ML
INJECTION, SOLUTION INTRAVENOUS CONTINUOUS
Status: DISCONTINUED | OUTPATIENT
Start: 2023-01-01 | End: 2023-01-01

## 2023-01-01 RX ORDER — DEXTROSE MONOHYDRATE, SODIUM CHLORIDE, AND POTASSIUM CHLORIDE 50; 1.49; 4.5 G/1000ML; G/1000ML; G/1000ML
INJECTION, SOLUTION INTRAVENOUS CONTINUOUS
Status: DISCONTINUED | OUTPATIENT
Start: 2023-01-01 | End: 2023-01-01 | Stop reason: HOSPADM

## 2023-01-01 RX ORDER — LIDOCAINE 4 G/G
1 PATCH TOPICAL
Status: DISCONTINUED | OUTPATIENT
Start: 2023-01-01 | End: 2023-01-01 | Stop reason: HOSPADM

## 2023-01-01 RX ORDER — HEPARIN SODIUM (PORCINE) LOCK FLUSH IV SOLN 100 UNIT/ML 100 UNIT/ML
5-10 SOLUTION INTRAVENOUS
Status: DISCONTINUED | OUTPATIENT
Start: 2023-01-01 | End: 2023-01-01 | Stop reason: HOSPADM

## 2023-01-01 RX ORDER — HYDROMORPHONE HYDROCHLORIDE 1 MG/ML
.3-.5 INJECTION, SOLUTION INTRAMUSCULAR; INTRAVENOUS; SUBCUTANEOUS EVERY 4 HOURS PRN
Status: DISCONTINUED | OUTPATIENT
Start: 2023-01-01 | End: 2023-01-01 | Stop reason: HOSPADM

## 2023-01-01 RX ORDER — HEPARIN SODIUM,PORCINE 10 UNIT/ML
5-10 VIAL (ML) INTRAVENOUS
Status: DISCONTINUED | OUTPATIENT
Start: 2023-01-01 | End: 2023-01-01 | Stop reason: HOSPADM

## 2023-01-01 RX ORDER — POLYETHYLENE GLYCOL 3350 17 G/17G
17 POWDER, FOR SOLUTION ORAL DAILY
Status: DISCONTINUED | OUTPATIENT
Start: 2023-01-01 | End: 2023-01-01 | Stop reason: HOSPADM

## 2023-01-01 RX ORDER — AMOXICILLIN 250 MG
1 CAPSULE ORAL 2 TIMES DAILY
Qty: 100 TABLET | Refills: 0 | Status: ON HOLD | OUTPATIENT
Start: 2023-01-01 | End: 2023-01-01

## 2023-01-01 RX ORDER — ENOXAPARIN SODIUM 100 MG/ML
1 INJECTION SUBCUTANEOUS EVERY 12 HOURS
Status: DISCONTINUED | OUTPATIENT
Start: 2023-01-01 | End: 2023-01-01 | Stop reason: HOSPADM

## 2023-01-01 RX ADMIN — ACETAMINOPHEN 650 MG: 325 TABLET ORAL at 02:21

## 2023-01-01 RX ADMIN — HYDROMORPHONE HYDROCHLORIDE 0.2 MG: 0.2 INJECTION, SOLUTION INTRAMUSCULAR; INTRAVENOUS; SUBCUTANEOUS at 18:00

## 2023-01-01 RX ADMIN — AMPICILLIN SODIUM AND SULBACTAM SODIUM 3 G: 2; 1 INJECTION, POWDER, FOR SOLUTION INTRAMUSCULAR; INTRAVENOUS at 09:31

## 2023-01-01 RX ADMIN — POTASSIUM CHLORIDE, DEXTROSE MONOHYDRATE AND SODIUM CHLORIDE: 150; 5; 450 INJECTION, SOLUTION INTRAVENOUS at 17:13

## 2023-01-01 RX ADMIN — THERA TABS 1 TABLET: TAB at 20:40

## 2023-01-01 RX ADMIN — ACETAMINOPHEN 650 MG: 325 TABLET ORAL at 17:53

## 2023-01-01 RX ADMIN — HYDROMORPHONE HYDROCHLORIDE 0.3 MG: 1 INJECTION, SOLUTION INTRAMUSCULAR; INTRAVENOUS; SUBCUTANEOUS at 10:23

## 2023-01-01 RX ADMIN — HYDROMORPHONE HYDROCHLORIDE 0.5 MG: 1 INJECTION, SOLUTION INTRAMUSCULAR; INTRAVENOUS; SUBCUTANEOUS at 13:40

## 2023-01-01 RX ADMIN — THIAMINE HCL TAB 100 MG 100 MG: 100 TAB at 20:40

## 2023-01-01 RX ADMIN — HEPARIN SODIUM 1300 UNITS/HR: 10000 INJECTION, SOLUTION INTRAVENOUS at 03:44

## 2023-01-01 RX ADMIN — HYDROMORPHONE HYDROCHLORIDE 4 MG: 4 TABLET ORAL at 15:13

## 2023-01-01 RX ADMIN — GABAPENTIN 200 MG: 100 CAPSULE ORAL at 16:02

## 2023-01-01 RX ADMIN — HYDROMORPHONE HYDROCHLORIDE 0.2 MG: 0.2 INJECTION, SOLUTION INTRAMUSCULAR; INTRAVENOUS; SUBCUTANEOUS at 22:23

## 2023-01-01 RX ADMIN — HYDROXYZINE HYDROCHLORIDE 25 MG: 25 TABLET, FILM COATED ORAL at 17:53

## 2023-01-01 RX ADMIN — ONDANSETRON 4 MG: 2 INJECTION INTRAMUSCULAR; INTRAVENOUS at 09:39

## 2023-01-01 RX ADMIN — POTASSIUM CHLORIDE, DEXTROSE MONOHYDRATE AND SODIUM CHLORIDE: 150; 5; 450 INJECTION, SOLUTION INTRAVENOUS at 02:21

## 2023-01-01 RX ADMIN — TAZOBACTAM SODIUM AND PIPERACILLIN SODIUM 4.5 G: 500; 4 INJECTION, SOLUTION INTRAVENOUS at 19:02

## 2023-01-01 RX ADMIN — HEPARIN SODIUM AND DEXTROSE 1000 UNITS/HR: 10000; 5 INJECTION INTRAVENOUS at 16:37

## 2023-01-01 RX ADMIN — SODIUM CHLORIDE, POTASSIUM CHLORIDE, SODIUM LACTATE AND CALCIUM CHLORIDE 1000 ML: 600; 310; 30; 20 INJECTION, SOLUTION INTRAVENOUS at 12:12

## 2023-01-01 RX ADMIN — POTASSIUM CHLORIDE, DEXTROSE MONOHYDRATE AND SODIUM CHLORIDE: 150; 5; 450 INJECTION, SOLUTION INTRAVENOUS at 06:01

## 2023-01-01 RX ADMIN — IOPAMIDOL 100 ML: 755 INJECTION, SOLUTION INTRAVENOUS at 14:37

## 2023-01-01 RX ADMIN — AMPICILLIN SODIUM AND SULBACTAM SODIUM 3 G: 2; 1 INJECTION, POWDER, FOR SOLUTION INTRAMUSCULAR; INTRAVENOUS at 15:40

## 2023-01-01 RX ADMIN — POTASSIUM CHLORIDE, DEXTROSE MONOHYDRATE AND SODIUM CHLORIDE: 150; 5; 450 INJECTION, SOLUTION INTRAVENOUS at 21:41

## 2023-01-01 RX ADMIN — THERA TABS 1 TABLET: TAB at 09:12

## 2023-01-01 RX ADMIN — SODIUM CHLORIDE 65 ML: 9 INJECTION, SOLUTION INTRAVENOUS at 13:13

## 2023-01-01 RX ADMIN — HYDROMORPHONE HYDROCHLORIDE 0.2 MG: 0.2 INJECTION, SOLUTION INTRAMUSCULAR; INTRAVENOUS; SUBCUTANEOUS at 10:33

## 2023-01-01 RX ADMIN — MORPHINE SULFATE 4 MG: 4 INJECTION, SOLUTION INTRAMUSCULAR; INTRAVENOUS at 18:28

## 2023-01-01 RX ADMIN — HYDROXYZINE HYDROCHLORIDE 25 MG: 25 TABLET, FILM COATED ORAL at 16:03

## 2023-01-01 RX ADMIN — POTASSIUM CHLORIDE, DEXTROSE MONOHYDRATE AND SODIUM CHLORIDE: 150; 5; 450 INJECTION, SOLUTION INTRAVENOUS at 18:12

## 2023-01-01 RX ADMIN — POLYETHYLENE GLYCOL 3350 17 G: 17 POWDER, FOR SOLUTION ORAL at 17:53

## 2023-01-01 RX ADMIN — APIXABAN 10 MG: 5 TABLET, FILM COATED ORAL at 10:58

## 2023-01-01 RX ADMIN — MORPHINE SULFATE 10 MG: 10 SOLUTION ORAL at 11:56

## 2023-01-01 RX ADMIN — SODIUM CHLORIDE 80 ML: 9 INJECTION, SOLUTION INTRAVENOUS at 14:37

## 2023-01-01 RX ADMIN — AMPICILLIN SODIUM AND SULBACTAM SODIUM 3 G: 2; 1 INJECTION, POWDER, FOR SOLUTION INTRAMUSCULAR; INTRAVENOUS at 01:54

## 2023-01-01 RX ADMIN — LIDOCAINE PATCH 4% 1 PATCH: 40 PATCH TOPICAL at 20:40

## 2023-01-01 RX ADMIN — POTASSIUM CHLORIDE, DEXTROSE MONOHYDRATE AND SODIUM CHLORIDE: 150; 5; 450 INJECTION, SOLUTION INTRAVENOUS at 21:26

## 2023-01-01 RX ADMIN — AMPICILLIN SODIUM AND SULBACTAM SODIUM 3 G: 2; 1 INJECTION, POWDER, FOR SOLUTION INTRAMUSCULAR; INTRAVENOUS at 21:34

## 2023-01-01 RX ADMIN — ACETAMINOPHEN 650 MG: 325 TABLET ORAL at 09:17

## 2023-01-01 RX ADMIN — SENNOSIDES AND DOCUSATE SODIUM 1 TABLET: 50; 8.6 TABLET ORAL at 20:40

## 2023-01-01 RX ADMIN — HEPARIN SODIUM (PORCINE) LOCK FLUSH IV SOLN 100 UNIT/ML 10 ML: 100 SOLUTION at 11:00

## 2023-01-01 RX ADMIN — PANTOPRAZOLE SODIUM 40 MG: 40 INJECTION, POWDER, FOR SOLUTION INTRAVENOUS at 09:31

## 2023-01-01 RX ADMIN — GABAPENTIN 200 MG: 100 CAPSULE ORAL at 21:45

## 2023-01-01 RX ADMIN — IOPAMIDOL 125 ML: 755 INJECTION, SOLUTION INTRAVENOUS at 13:00

## 2023-01-01 RX ADMIN — HYDROMORPHONE HYDROCHLORIDE 0.3 MG: 1 INJECTION, SOLUTION INTRAMUSCULAR; INTRAVENOUS; SUBCUTANEOUS at 22:17

## 2023-01-01 RX ADMIN — THIAMINE HCL TAB 100 MG 100 MG: 100 TAB at 09:12

## 2023-01-01 RX ADMIN — HYDROXYZINE HYDROCHLORIDE 25 MG: 25 TABLET, FILM COATED ORAL at 23:40

## 2023-01-01 RX ADMIN — ENOXAPARIN SODIUM 60 MG: 60 INJECTION SUBCUTANEOUS at 09:31

## 2023-01-01 RX ADMIN — HYDROMORPHONE HYDROCHLORIDE 0.2 MG: 0.2 INJECTION, SOLUTION INTRAMUSCULAR; INTRAVENOUS; SUBCUTANEOUS at 07:58

## 2023-01-01 RX ADMIN — HYDROMORPHONE HYDROCHLORIDE 0.5 MG: 1 INJECTION, SOLUTION INTRAMUSCULAR; INTRAVENOUS; SUBCUTANEOUS at 15:24

## 2023-01-01 RX ADMIN — GABAPENTIN 200 MG: 100 CAPSULE ORAL at 17:54

## 2023-01-01 RX ADMIN — ENOXAPARIN SODIUM 60 MG: 60 INJECTION SUBCUTANEOUS at 10:23

## 2023-01-01 RX ADMIN — HEPARIN SODIUM 1150 UNITS/HR: 10000 INJECTION, SOLUTION INTRAVENOUS at 18:12

## 2023-01-01 RX ADMIN — GABAPENTIN 200 MG: 100 CAPSULE ORAL at 09:13

## 2023-01-01 RX ADMIN — HYDROMORPHONE HYDROCHLORIDE 0.3 MG: 1 INJECTION, SOLUTION INTRAMUSCULAR; INTRAVENOUS; SUBCUTANEOUS at 06:11

## 2023-01-01 RX ADMIN — HYDROMORPHONE HYDROCHLORIDE 0.5 MG: 1 INJECTION, SOLUTION INTRAMUSCULAR; INTRAVENOUS; SUBCUTANEOUS at 21:19

## 2023-01-01 RX ADMIN — SCOPALAMINE 1 PATCH: 1 PATCH, EXTENDED RELEASE TRANSDERMAL at 11:06

## 2023-01-01 RX ADMIN — LORAZEPAM 1 MG: 2 INJECTION INTRAMUSCULAR; INTRAVENOUS at 16:39

## 2023-01-01 RX ADMIN — PANTOPRAZOLE SODIUM 40 MG: 40 INJECTION, POWDER, FOR SOLUTION INTRAVENOUS at 08:06

## 2023-01-01 RX ADMIN — HYDROXYZINE HYDROCHLORIDE 25 MG: 25 TABLET, FILM COATED ORAL at 06:17

## 2023-01-01 RX ADMIN — HEPARIN SODIUM 1300 UNITS/HR: 10000 INJECTION, SOLUTION INTRAVENOUS at 09:06

## 2023-01-01 RX ADMIN — POTASSIUM CHLORIDE, DEXTROSE MONOHYDRATE AND SODIUM CHLORIDE: 150; 5; 450 INJECTION, SOLUTION INTRAVENOUS at 07:57

## 2023-01-01 RX ADMIN — HYDROMORPHONE HYDROCHLORIDE 0.3 MG: 1 INJECTION, SOLUTION INTRAMUSCULAR; INTRAVENOUS; SUBCUTANEOUS at 01:44

## 2023-01-01 RX ADMIN — HYDROMORPHONE HYDROCHLORIDE 0.2 MG: 0.2 INJECTION, SOLUTION INTRAMUSCULAR; INTRAVENOUS; SUBCUTANEOUS at 13:21

## 2023-01-01 RX ADMIN — POTASSIUM CHLORIDE, DEXTROSE MONOHYDRATE AND SODIUM CHLORIDE: 150; 5; 450 INJECTION, SOLUTION INTRAVENOUS at 07:58

## 2023-01-01 RX ADMIN — FENTANYL 1 PATCH: 12 PATCH TRANSDERMAL at 16:27

## 2023-01-01 RX ADMIN — HYDROMORPHONE HYDROCHLORIDE 0.3 MG: 1 INJECTION, SOLUTION INTRAMUSCULAR; INTRAVENOUS; SUBCUTANEOUS at 13:12

## 2023-01-01 RX ADMIN — ENOXAPARIN SODIUM 60 MG: 60 INJECTION SUBCUTANEOUS at 22:23

## 2023-01-01 ASSESSMENT — ACTIVITIES OF DAILY LIVING (ADL)
ADLS_ACUITY_SCORE: 20
WEAR_GLASSES_OR_BLIND: YES
WALKING_OR_CLIMBING_STAIRS_DIFFICULTY: NO
ADLS_ACUITY_SCORE: 20
ADLS_ACUITY_SCORE: 35
DOING_ERRANDS_INDEPENDENTLY_DIFFICULTY: NO
ADLS_ACUITY_SCORE: 34
ADLS_ACUITY_SCORE: 36
FALL_HISTORY_WITHIN_LAST_SIX_MONTHS: NO
ADLS_ACUITY_SCORE: 20
ADLS_ACUITY_SCORE: 34
ADLS_ACUITY_SCORE: 34
CHANGE_IN_FUNCTIONAL_STATUS_SINCE_ONSET_OF_CURRENT_ILLNESS/INJURY: NO
ADLS_ACUITY_SCORE: 20
TOILETING_ISSUES: NO
ADLS_ACUITY_SCORE: 20
VISION_MANAGEMENT: GLASSES
ADLS_ACUITY_SCORE: 20
ADLS_ACUITY_SCORE: 20
ADLS_ACUITY_SCORE: 34
ADLS_ACUITY_SCORE: 34
ADLS_ACUITY_SCORE: 20
ADLS_ACUITY_SCORE: 20
DIFFICULTY_COMMUNICATING: NO
CONCENTRATING,_REMEMBERING_OR_MAKING_DECISIONS_DIFFICULTY: NO
ADLS_ACUITY_SCORE: 20
ADLS_ACUITY_SCORE: 20
WALKING_OR_CLIMBING_STAIRS_DIFFICULTY: NO
ADLS_ACUITY_SCORE: 20
CHANGE_IN_FUNCTIONAL_STATUS_SINCE_ONSET_OF_CURRENT_ILLNESS/INJURY: NO
ADLS_ACUITY_SCORE: 34
ADLS_ACUITY_SCORE: 20
ADLS_ACUITY_SCORE: 35
ADLS_ACUITY_SCORE: 34
WEAR_GLASSES_OR_BLIND: YES
ADLS_ACUITY_SCORE: 36
EATING/SWALLOWING: EATING;SWALLOWING SOLID FOOD
ADLS_ACUITY_SCORE: 20
ADLS_ACUITY_SCORE: 36
DRESSING/BATHING_DIFFICULTY: NO
ADLS_ACUITY_SCORE: 20
DRESSING/BATHING_DIFFICULTY: NO
ADLS_ACUITY_SCORE: 35
ADLS_ACUITY_SCORE: 34
TOILETING_ISSUES: NO
ADLS_ACUITY_SCORE: 34
ADLS_ACUITY_SCORE: 31
ADLS_ACUITY_SCORE: 34
ADLS_ACUITY_SCORE: 20
DIFFICULTY_COMMUNICATING: NO
ADLS_ACUITY_SCORE: 20
HEARING_DIFFICULTY_OR_DEAF: NO
ADLS_ACUITY_SCORE: 35
CONCENTRATING,_REMEMBERING_OR_MAKING_DECISIONS_DIFFICULTY: NO
ADLS_ACUITY_SCORE: 27
ADLS_ACUITY_SCORE: 35
ADLS_ACUITY_SCORE: 35
ADLS_ACUITY_SCORE: 36
ADLS_ACUITY_SCORE: 20
ADLS_ACUITY_SCORE: 20
DIFFICULTY_EATING/SWALLOWING: YES
ADLS_ACUITY_SCORE: 27
ADLS_ACUITY_SCORE: 35
ADLS_ACUITY_SCORE: 34
ADLS_ACUITY_SCORE: 20
ADLS_ACUITY_SCORE: 34
DOING_ERRANDS_INDEPENDENTLY_DIFFICULTY: NO
ADLS_ACUITY_SCORE: 20
ADLS_ACUITY_SCORE: 34
ADLS_ACUITY_SCORE: 36
FALL_HISTORY_WITHIN_LAST_SIX_MONTHS: NO
ADLS_ACUITY_SCORE: 36
DIFFICULTY_EATING/SWALLOWING: NO

## 2023-05-02 NOTE — PROGRESS NOTES
Writer spoke with Dr. Arce. She recommends that Edilberto get evaluated in the emergency room. Writer spoke to Radha about Dr. Arce's recommendations. She verbalized understanding and states that she will bring him to the ER.    Louise Willis RN on 5/2/2023 at 3:06 PM

## 2023-05-02 NOTE — PROGRESS NOTES
"Writer received a call from Edilberto's sister Radha. Edilberto is a previous patient of Dr. Ji. He was seen for esophageal cancer and was treated with FOLFOX + Nivolumab. Edilberto was doing great and decided to move back to [place].     Edilberto is now visiting his sister Radha in [place]. He started a new chemotherapy regimen prior to coming here. Radha states that Edilberto is really going downhill. \"I don't think he's going to make it back to [place].\" He has complaints of stabbing pain, no energy, and hasn't been able to eat. Radha and Edilberto would like to come back to see Dr. Arce and were very appreciative of the care they have received in the past.     Writer will route to Dr. Arce for recommendations.     Louise Willis RN on 5/2/2023 at 2:31 PM    "

## 2023-05-03 PROBLEM — D72.829 LEUKOCYTOSIS, UNSPECIFIED TYPE: Status: ACTIVE | Noted: 2023-01-01

## 2023-05-03 PROBLEM — C78.89: Status: ACTIVE | Noted: 2023-01-01

## 2023-05-03 NOTE — ED NOTES
[facility]  ED Nurse Handoff Report    Noah Morrison is a 58 year old male   ED Chief complaint: Abdominal Pain  . ED Diagnosis:   Final diagnoses:   Leukocytosis, unspecified type   Metastatic adenocarcinoma to esophagus (H)     Allergies: No Known Allergies    Code Status: Full Code  Activity level - Baseline/Home:  Independent. Activity Level - Current:   Independent. Lift room needed: No. Bariatric: No   Needed: No   Isolation: No. Infection: Not Applicable.     Vital Signs:   Vitals:    05/03/23 1401 05/03/23 1420 05/03/23 1430 05/03/23 1725   BP: 119/79 113/76 108/77 112/88   Pulse: 88  91 107   Resp:    16   Temp:       TempSrc:       SpO2: 95% 95% 93% 94%   Weight:           Cardiac Rhythm:  ,      Pain level:    Patient confused: No. Patient Falls Risk: No.   Elimination Status: Has voided   Patient Report - Initial Complaint: Noah Morrison is a 58 year old male with a past medical history significant for esophageal cancer currently undergoing chemotherapy with last treatment on 4/18/2023 consisting of cyramza, paclitaxel and neuroplasta prn who presents to the ED via/accompanied by sister with a chief complaint of periumbilical abdominal pain ongoing for the last year but worsening yesterday, worsened Cerubidin, severe, nonradiating without specific alleviating or worsening factors and improving after approximately 1 hour to currently 2/10.  Patient reports that he is decreased p.o. intake since starting chemotherapy.  Reports his last bowel movement was approximately 2 days ago which is not out of the ordinary for him particular given his antinausea medicine can (ondansetron) can cause constipation.  He denies shortness of breath, chest pain, fevers, chills, changes in urination... Focused Assessment:    Tests Performed:   Abnormal Labs Resulted from Time of ED Arrival to Time of ED Departure   COMPREHENSIVE METABOLIC PANEL - Abnormal       Result Value    Sodium 137       Potassium 3.9      Chloride 104      Carbon Dioxide (CO2) 23      Anion Gap 10      Urea Nitrogen 8.5      Creatinine 0.66 (*)     Calcium 8.2 (*)     Glucose 107 (*)     Alkaline Phosphatase 151 (*)     AST 18      ALT 20      Protein Total 6.4      Albumin 2.9 (*)     Bilirubin Total 0.5      GFR Estimate >90     LIPASE - Abnormal    Lipase 862 (*)    ROUTINE UA WITH MICROSCOPIC REFLEX TO CULTURE - Abnormal    Color Urine Yellow      Appearance Urine Clear      Glucose Urine Negative      Bilirubin Urine Negative      Ketones Urine Negative      Specific Gravity Urine 1.015      Blood Urine Negative      pH Urine 5.5      Protein Albumin Urine 30 (*)     Urobilinogen Urine Normal      Nitrite Urine Negative      Leukocyte Esterase Urine Negative      Bacteria Urine Moderate (*)     Mucus Urine Present (*)     RBC Urine 1      WBC Urine 1      Squamous Epithelials Urine 1     CBC WITH PLATELETS AND DIFFERENTIAL - Abnormal    WBC Count 16.5 (*)     RBC Count 3.99 (*)     Hemoglobin 12.2 (*)     Hematocrit 38.2 (*)     MCV 96      MCH 30.6      MCHC 31.9      RDW 15.2 (*)     Platelet Count 375      % Neutrophils 87      % Lymphocytes 4      % Monocytes 7      % Eosinophils 1      % Basophils 0      % Immature Granulocytes 1      NRBCs per 100 WBC 0      Absolute Neutrophils 14.4 (*)     Absolute Lymphocytes 0.7 (*)     Absolute Monocytes 1.1      Absolute Eosinophils 0.1      Absolute Basophils 0.1      Absolute Immature Granulocytes 0.2      Absolute NRBCs 0.0     ISTAT GASES LACTATE VENOUS POCT - Abnormal    Lactic Acid POCT 1.1      Bicarbonate Venous POCT 24      O2 Sat, Venous POCT 58 (*)     pCO2V Venous POCT 38 (*)     pH Venous POCT 7.41      pO2 Venous POCT 30        US Upper Extremity Venous Duplex Left   Final Result   IMPRESSION:    1.  Deep vein thrombosis involving the left internal jugular,   subclavian and axial veins as above. Surrounding venous collaterals   noted.   2.  Nonocclusive superficial  thrombus involving the basilic vein.      OMARI WAKEFIELD MD            SYSTEM ID:  [id number]      Cervical spine CT w/o contrast   Final Result   IMPRESSION:   1. Mild degenerative change.   2. No destructive or aggressive osseous lesions.   3. No high-grade stenoses.   4. Cervical lymphadenopathy. Refer to soft tissue neck report.      ANA PAULA KU MD            SYSTEM ID:  [id number]      Soft tissue neck CT w contrast   Final Result   IMPRESSION:     1. Multiple abnormal cervical lymph nodes concerning for metastatic   disease. Associated perirenal inflammatory stranding is present -   superimposed infectious/reactive adenitis cannot be excluded.   2. Filling defect within the left internal jugular vein concerning for   deep vein thrombosis with poor opacification of the inferior internal   jugular vein, brachiocephalic vein, and left subclavian vein.   Ultrasound correlation could be performed.        ANA PAULA KU MD            SYSTEM ID:  [id number]      CT Chest/Abdomen/Pelvis w Contrast   Final Result   IMPRESSION: Since 10/19/2022, overall disease progression   1.  Stable distal esophageal wall thickening.    2.  Increased linear and nodular opacities in the lungs with a new   discrete 1.6 cm right lower lobe nodule, suspicious for lymphangitic   carcinomatosis and progression of pulmonary metastases. Possible   superimposed pulmonary edema and/or atypical pneumonia. New small   right pleural effusion.   3.  New/progressive lymphadenopathy in the left axilla, visualized   lower neck, retroperitoneum and pelvis. Increased linear soft tissue   thickening around right sided bronchi may be due to worsening   lymphangitic carcinomatosis or lymphadenopathy.   4.  New 2 cm right adrenal nodule, likely metastatic.   5.  Increased soft tissue thickening in the hepatic hilum and around   intrahepatic bile ducts which may be due to cholangitis or metastases.   Pneumobilia has decreased suggesting possible stent  malfunction.   Increased gallbladder distention.   6.  Stable 1.9 cm nodule/mass abutting the pancreatic head, likely a   peripancreatic metastatic lymph node.   7.  Mild nonspecific fat stranding around the pancreas is stable.   8.  Previously seen colitis has resolved.      SAEED MORRISON MD            SYSTEM ID:  [id number]       . Abnormal Results: .   Treatments provided: Heparin, pain meds  Family Comments: Sister at bedside.   OBS brochure/video discussed/provided to patient:  No  ED Medications:   Medications   ondansetron (ZOFRAN) injection 4 mg (has no administration in time range)   morphine (PF) injection 4 mg (4 mg Intravenous $Given 5/3/23 1828)   piperacillin-tazobactam (ZOSYN) intermittent infusion 4.5 g (has no administration in time range)   heparin infusion 25,000 units in D5W 250 mL ANTICOAGULANT (1,150 Units/hr Intravenous $New Bag 5/3/23 1812)   lactated ringers BOLUS 1,000 mL (0 mLs Intravenous Stopped 5/3/23 1312)   iopamidol (ISOVUE-370) solution 500 mL (125 mLs Intravenous $Given 5/3/23 1300)   CT scan flush (65 mLs Intravenous $Given 5/3/23 1313)   heparin ANTICOAGULANT Loading dose for HIGH INTENSITY TREATMENT * Give BEFORE starting heparin infusion (5,200 Units Intravenous $Given 5/3/23 1811)     Drips infusing:  Yes  For the majority of the shift, the patient's behavior Green. Interventions performed were .    Sepsis treatment initiated: No     Patient tested for COVID 19 prior to admission: NO    ED Nurse Name/Phone Number: Chandler Kothari RN,   6:54 PM  RECEIVING UNIT ED HANDOFF REVIEW    Above ED Nurse Handoff Report was reviewed: Yes  Reviewed by: Genoveva Ross RN on May 3, 2023 at 8:11 PM

## 2023-05-03 NOTE — ED PROVIDER NOTES
History     Chief Complaint:  Abdominal Pain       HPI   Noah Morrison is a 58 year old male with a past medical history significant for esophageal cancer currently undergoing chemotherapy with last treatment on 4/18/2023 consisting of cyramza, paclitaxel and neuroplasta prn who presents to the ED via/accompanied by sister with a chief complaint of periumbilical abdominal pain ongoing for the last year but worsening yesterday, worsened Cerubidin, severe, nonradiating without specific alleviating or worsening factors and improving after approximately 1 hour to currently 2/10.  Patient reports that he is decreased p.o. intake since starting chemotherapy.  Reports his last bowel movement was approximately 2 days ago which is not out of the ordinary for him particular given his antinausea medicine can (ondansetron) can cause constipation.  He denies shortness of breath, chest pain, fevers, chills, changes in urination..      Independent Historian: The patient provides a history    Review of External Notes: See MDM    ROS:  Review of Systems  Full ROS completed and negative other than pertinent positives and negatives noted in HPI    Allergies:  No Known Allergies     Medications:    acetaminophen (TYLENOL) 325 MG tablet  dexamethasone (DECADRON) 4 MG tablet  ondansetron (ZOFRAN) 8 MG tablet  OXYCODONE HCL PO  prochlorperazine (COMPAZINE) 10 MG tablet  Rivaroxaban ANTICOAGULANT 15 & 20 MG TBPK Starter Therapy Pack        Past Medical History:    No past medical history on file.    Past Surgical History:    No past surgical history on file.     Family History:    family history is not on file.    Social History:   reports that he has quit smoking. He has never used smokeless tobacco. He reports current alcohol use. He reports current drug use. Drug: Marijuana.  PCP: No Ref-Primary, Physician     Physical Exam     Patient Vitals for the past 24 hrs:   BP Temp Temp src Pulse Resp SpO2 Weight   05/03/23 1725 112/88 -- --  107 16 94 % --   05/03/23 1430 108/77 -- -- 91 -- 93 % --   05/03/23 1420 113/76 -- -- -- -- 95 % --   05/03/23 1401 119/79 -- -- 88 -- 95 % --   05/03/23 1346 119/85 -- -- 87 -- 97 % --   05/03/23 1331 124/82 -- -- 89 -- 99 % --   05/03/23 1326 125/82 -- -- 92 -- 97 % --   05/03/23 1246 107/81 -- -- 87 -- 97 % --   05/03/23 1117 112/86 97.7  F (36.5  C) Temporal 113 18 98 % 65 kg (143 lb 4.8 oz)        Physical Exam  Constitutional: Well developed, nontox appearance  Head: Atraumatic.   Mouth/Throat: Oropharynx is clear and tacky  Neck:  no stridor  Eyes: no scleral icterus  Cardiovascular: Borderline regular tachycardia, 2+ bilat radial pulses  Pulmonary/Chest: Port to right chest, nml resp effort, Clear BS bilat  Abdominal: ND, soft, very minimal periumbilical tenderness, no rebound or guarding   Ext: Warm, well perfused, no edema  Neurological: A&O, symmetric facies, moves ext x4  Skin: Skin is warm and dry.   Psychiatric: Behavior is normal. Thought content normal.   Nursing note and vitals reviewed.    Emergency Department Course   ECG:  No results found for this or any previous visit.    Imaging:  US Upper Extremity Venous Duplex Left   Final Result   IMPRESSION:    1.  Deep vein thrombosis involving the left internal jugular,   subclavian and axial veins as above. Surrounding venous collaterals   noted.   2.  Nonocclusive superficial thrombus involving the basilic vein.      OMARI WAKEFIELD MD            SYSTEM ID:  [id number]      Cervical spine CT w/o contrast   Final Result   IMPRESSION:   1. Mild degenerative change.   2. No destructive or aggressive osseous lesions.   3. No high-grade stenoses.   4. Cervical lymphadenopathy. Refer to soft tissue neck report.      ANA PAULA KU MD            SYSTEM ID:  [id number]      Soft tissue neck CT w contrast   Final Result   IMPRESSION:     1. Multiple abnormal cervical lymph nodes concerning for metastatic   disease. Associated perirenal inflammatory stranding  is present -   superimposed infectious/reactive adenitis cannot be excluded.   2. Filling defect within the left internal jugular vein concerning for   deep vein thrombosis with poor opacification of the inferior internal   jugular vein, brachiocephalic vein, and left subclavian vein.   Ultrasound correlation could be performed.        ANA PAULA KU MD            SYSTEM ID:  [id number]      CT Chest/Abdomen/Pelvis w Contrast   Final Result   IMPRESSION: Since 10/19/2022, overall disease progression   1.  Stable distal esophageal wall thickening.    2.  Increased linear and nodular opacities in the lungs with a new   discrete 1.6 cm right lower lobe nodule, suspicious for lymphangitic   carcinomatosis and progression of pulmonary metastases. Possible   superimposed pulmonary edema and/or atypical pneumonia. New small   right pleural effusion.   3.  New/progressive lymphadenopathy in the left axilla, visualized   lower neck, retroperitoneum and pelvis. Increased linear soft tissue   thickening around right sided bronchi may be due to worsening   lymphangitic carcinomatosis or lymphadenopathy.   4.  New 2 cm right adrenal nodule, likely metastatic.   5.  Increased soft tissue thickening in the hepatic hilum and around   intrahepatic bile ducts which may be due to cholangitis or metastases.   Pneumobilia has decreased suggesting possible stent malfunction.   Increased gallbladder distention.   6.  Stable 1.9 cm nodule/mass abutting the pancreatic head, likely a   peripancreatic metastatic lymph node.   7.  Mild nonspecific fat stranding around the pancreas is stable.   8.  Previously seen colitis has resolved.      SAEED MORRISON MD            SYSTEM ID:  [id number]           Report per radiology unless otherwise specified in report or noted in MDM    Laboratory:  Labs Ordered and Resulted from Time of ED Arrival to Time of ED Departure   COMPREHENSIVE METABOLIC PANEL - Abnormal       Result Value    Sodium 137       Potassium 3.9      Chloride 104      Carbon Dioxide (CO2) 23      Anion Gap 10      Urea Nitrogen 8.5      Creatinine 0.66 (*)     Calcium 8.2 (*)     Glucose 107 (*)     Alkaline Phosphatase 151 (*)     AST 18      ALT 20      Protein Total 6.4      Albumin 2.9 (*)     Bilirubin Total 0.5      GFR Estimate >90     LIPASE - Abnormal    Lipase 862 (*)    ROUTINE UA WITH MICROSCOPIC REFLEX TO CULTURE - Abnormal    Color Urine Yellow      Appearance Urine Clear      Glucose Urine Negative      Bilirubin Urine Negative      Ketones Urine Negative      Specific Gravity Urine 1.015      Blood Urine Negative      pH Urine 5.5      Protein Albumin Urine 30 (*)     Urobilinogen Urine Normal      Nitrite Urine Negative      Leukocyte Esterase Urine Negative      Bacteria Urine Moderate (*)     Mucus Urine Present (*)     RBC Urine 1      WBC Urine 1      Squamous Epithelials Urine 1     CBC WITH PLATELETS AND DIFFERENTIAL - Abnormal    WBC Count 16.5 (*)     RBC Count 3.99 (*)     Hemoglobin 12.2 (*)     Hematocrit 38.2 (*)     MCV 96      MCH 30.6      MCHC 31.9      RDW 15.2 (*)     Platelet Count 375      % Neutrophils 87      % Lymphocytes 4      % Monocytes 7      % Eosinophils 1      % Basophils 0      % Immature Granulocytes 1      NRBCs per 100 WBC 0      Absolute Neutrophils 14.4 (*)     Absolute Lymphocytes 0.7 (*)     Absolute Monocytes 1.1      Absolute Eosinophils 0.1      Absolute Basophils 0.1      Absolute Immature Granulocytes 0.2      Absolute NRBCs 0.0     ISTAT GASES LACTATE VENOUS POCT - Abnormal    Lactic Acid POCT 1.1      Bicarbonate Venous POCT 24      O2 Sat, Venous POCT 58 (*)     pCO2V Venous POCT 38 (*)     pH Venous POCT 7.41      pO2 Venous POCT 30     RBC AND PLATELET MORPHOLOGY    Platelet Assessment        Value: Automated Count Confirmed. Platelet morphology is normal.    RBC Morphology Confirmed RBC Indices     BLOOD CULTURE   BLOOD CULTURE        Procedures       Emergency Department  Course & Assessments:             Interventions:  Medications   ondansetron (ZOFRAN) injection 4 mg (has no administration in time range)   morphine (PF) injection 4 mg (has no administration in time range)   piperacillin-tazobactam (ZOSYN) intermittent infusion 4.5 g (has no administration in time range)   heparin infusion 25,000 units in D5W 250 mL ANTICOAGULANT (1,150 Units/hr Intravenous $New Bag 5/3/23 1812)   lactated ringers BOLUS 1,000 mL (0 mLs Intravenous Stopped 5/3/23 1312)   iopamidol (ISOVUE-370) solution 500 mL (125 mLs Intravenous $Given 5/3/23 1300)   CT scan flush (65 mLs Intravenous $Given 5/3/23 1313)   heparin ANTICOAGULANT Loading dose for HIGH INTENSITY TREATMENT * Give BEFORE starting heparin infusion (5,200 Units Intravenous $Given 5/3/23 181)        Independent Interpretation (X-rays, CTs, rhythm strip):  See MDM    Consultations/Discussion of Management or Tests:  I discussed the patient with on-call oncology, Dr. Wright recommended that the patient stay for admission and further evaluation    Social Determinants of Health affecting care:  See MDM    Disposition:  The patient was admitted to the hospital under the care of Dr. Echeverria.     Impression & Plan    CMS Diagnoses: none  Medical Decision Makin year old male presenting w/ transient worsening abdominal pain    Social determin oncology telephone note from 2023 ants affecting patient's health include:  Patient continues to smoke and drink intermittently although rarely potentially increasing the complications from tobacco and alcohol use     I reviewed medical records from  oncology telephone note from 2023    DDx includes acute on chronic abdominal pain, constipation, obstruction, intra-abdominal mass or metastasis, intra-abdominal lymphadenopathy, hepatitis, pancreatitis, colitis, enteritis.  Doubt atypical ACS given history, duration of symptoms, physical exam.  Labs significant for leukocytosis, elevated lipase,  bilirubin normal.  Imaging sig for multiple findings as noted above significantly left IJ thrombus, decreased pneumobilia although the patient's labs are not indicative of acute obstruction and he has no right upper quadrant tenderness making cholangitis or acute stent failure seem less likely.  I discussed patient with oncology who recommended admission given leukocytosis and findings on CT.  Heparin infusion was started and consideration of possible future procedures the patient was given Zosyn and blood cultures drawn prior.  Patient counseled on all results, disposition and diagnosis.  They are understanding and agreeable to plan. Patient admitted in guarded condition.        Diagnosis:    ICD-10-CM    1. Leukocytosis, unspecified type  D72.829       2. Metastatic adenocarcinoma to esophagus (H)  C78.89            Discharge Medications:  New Prescriptions    No medications on file        5/3/2023   Aaron Gaxiola MD Vaughn, Christopher E, MD  05/03/23 1828

## 2023-05-03 NOTE — ED TRIAGE NOTES
Presents to ED with complaints of abdominal pain. Pt here visiting from [place] where he is undergoing chemotherapy for esophageal cancer. States he hasn't been feeling well, hasn't eaten much, and hasn't had a bowel movement in two days. Pt tachycardic. Last chemo 2 weeks ago, due for another infusion next week. ABCs intact.      Triage Assessment     Row Name 05/03/23 1117       Triage Assessment (Adult)    Airway WDL WDL       Respiratory WDL    Respiratory WDL WDL       Skin Circulation/Temperature WDL    Skin Circulation/Temperature WDL WDL       Cardiac WDL    Cardiac WDL X;rhythm    Pulse Rate & Regularity tachycardic       Peripheral/Neurovascular WDL    Peripheral Neurovascular WDL WDL       Cognitive/Neuro/Behavioral WDL    Cognitive/Neuro/Behavioral WDL WDL

## 2023-05-03 NOTE — H&P
[facility]    Hospitalist Admission Note    Name: Noah Morrison    MRN: [id number]  YOB: 1965    Age: 58 year old  Date of admission: 5/3/2023  Primary care provider: No Ref-Primary, Physician  Admitting physician : Allen Echeverria M.D. ,M.B.A.       Brief summary of admission assessment/MDM:    Noah Morrison is a 58 year old  male  who presents with abdominal pain.  Patient is from [place] visiting his sister here in [place].  He is originally from [place].  He has esophageal cancer undergoing chemotherapy with last treatment on April 18.    On presentation to ED, temp 97.7, heart rate 113, blood pressure 112/86, respiratory rate 18, oxygen saturation 98%.    Further evaluation revealed leukocytosis resolved specific focus of infection.  Lipase elevated at 862.      Imaging studies showed multiple abnormalities including stable distal esophageal wall thickening, increased linear and nodular opacities in the lungs with new discrete 1.6 cm right lower lobe nodule suspicious for lymphangitic carcinomatosis and progression of pulmonary metastatic disease.  New progressive lymphadenopathy in left axilla, adrenal nodule likely metastatic, concern for hepatic metastatic lesions, stable pancreatic mass.    Right upper extremity ultrasound showed deep venous thrombosis involving the left internal jugular, subclavian and axial veins.  Nonocclusive superficial thrombus involving the basilar vein    Assessment and Plan       #1.  Abdominal pain: Acute on chronic, suspect acute pancreatitis vs malignancy  -- Presents with acute on chronic abdominal pain epigastric, left upper quadrant likely secondary to underlying metastatic malignancy.  Elevated lipase concerning for acute pancreatitis.  Patient denies heavy use of alcohol.    Plan :   -- Admit to medical bed, pain control, keep n.p.o. for tonight, IV fluid hydration, IV pain medications, may advance diet  in the morning if he remains stable.    #2.  Leukocytosis: Afebrile, lactic acid 1.1, urinalysis unremarkable.  No evidence of chest infiltrates consistent with infection.  -- We will hold antibiotic, monitor cultures, CBC      #3.  DVT: DVT involving the left internal jugular, subclavian, axial veins.  Nonocclusive superficial thrombus involving the basilar vein  -- This is in the setting of malignancy.  Patient has been history of DVT and was treated with Xarelto in the past.  Currently not on anticoagulation.  -- Started on heparin in the ER, continue heparin, monitor closely and consult hematology oncology for further evaluation in light of his metastatic malignancy.      #4.  Metastatic esophageal cancer: Lungs, lymph nodes, adrenal, liver.  Stable pancreatic mass  -- Patient was diagnosed mid May 2022 when he presented with dysphagia and weight loss.  EGD showed obstructing mass in the thoracic esophagus  --He was treated with chemotherapy and radiation both in [place] and [place]  --Last chemotherapy was April 18.  --Patient was previously following with Dr. Arce and was previously treated with FOLFOX plus nivolumab.  Dr. Arce's office was contacted by his sister Radha yesterday   --We will consult oncology team for further evaluation recommendation.          # Admission Status: Will admit patient to hospitalist service as inpatient as patient likely need over two mid night stay  in the hospital.    # Diet:Keep NPO for now    # Activity:Advance activity as tolerated    # Education/Counseling :Discussed treatment plan with the patient    # Consults:Inpatient consult with oncology    # VTE prophylactic measures:prophylaxis against venous thromboembolism with heparin      # Code Status:Full Code    # Disposition:anticipate discharge to home and Anticipate discharge in 3 days        Disclaimer: This note consists of symbols derived from keyboarding, dictation and/or voice recognition software. As a result, there  may be errors in the script that have gone undetected. Please consider this when interpreting information found in this chart.             Chief Complaint:   Abdominal pain  History is obtained from the patient          History of Present Illness:      This patient is a 58 year old  male with a significant past medical history of esophageal cancer who presents with the following condition requiring a hospital admission:    Acute on chronic abdominal pain, DVT, metastatic malignancy    Edilberto 58-year-old male who is visiting his sister Radha from [place].  He is originally from [place] but moved to [place].  He has been having epigastric and left upper quadrant abdominal pain over the last 1 year.  His pain has been mild to moderate, on and off without any radiation.  His pain was severe yesterday 8 out of 10 in severity.  His pain got worse today and came to the emergency department for evaluation.  He denies any fever, chest pain or shortness of breath.  No leg pain, fever or chills.  He was treated with chemotherapy and radiation treatment [place]  He follows with Dr. Arce.  Patient's sister contacted Dr. Arce's office yesterday due to worsening abdominal pain and patient was instructed to come to emergency department for evaluation.  Otherwise patient denies any urinary symptoms, no nausea vomiting or diarrhea.  No cough.  He has no difficulty swallowing at this time.           Past Medical History:   Esophageal cancer         Past Surgical History:    Tonsillectomy  Port placement  PEG tube placement         Social History:     Social History     Tobacco Use     Smoking status: Former     Smokeless tobacco: Never   Vaping Use     Vaping status: Not on file   Substance Use Topics     Alcohol use: Yes             Family History:   Reviewed and non contributory        Allergies:   No Known Allergies          Medications:        Prior to Admission medications    Medication Sig Last  Dose Taking? Auth Provider Long Term End Date   acetaminophen (TYLENOL) 325 MG tablet Take 650 mg by mouth as needed   Reported, Patient     dexamethasone (DECADRON) 4 MG tablet Take 4 mg by mouth 2 times daily (with meals) 2-4 days after chemo   Reported, Patient Yes    ondansetron (ZOFRAN) 8 MG tablet Take 1 tablet (8 mg) by mouth every 8 hours as needed for nausea   Maria Alejandra Arce,      OXYCODONE HCL PO Take 20 mg by mouth every 6 hours as needed   Reported, Patient     prochlorperazine (COMPAZINE) 10 MG tablet Take 10 mg by mouth every 6 hours as needed for nausea or vomiting   Reported, Patient     Rivaroxaban ANTICOAGULANT 15 & 20 MG TBPK Starter Therapy Pack Take 15 mg by mouth 2 times daily (with meals) for 21 days, THEN 20 mg daily with food for 9 days.   Aaron Mercado MD Yes 11/18/22          Review of Systems:     A Comprehensive greater than 10 system review of systems was carried out.  Pertinent positives and negatives are noted above in HPI.  Otherwise negative for contributory information.           Physical Exam:     Vital signs were reviewed    Temp:  [97.7  F (36.5  C)] 97.7  F (36.5  C)  Pulse:  [] 107  Resp:  [16-18] 16  BP: (107-125)/(76-88) 112/88  SpO2:  [93 %-99 %] 94 %        GEN: awake, alert, cooperative, no apparent distress, oriented x 3    NECK:Supple ,no mass or thyromegaly     HEENT:  Normocephalic/atraumatic, no scleral icterus, no nasal discharge, mouth moist.    CV:  Regular rate and rhythm, no murmur or JVD.  S1 + S2 noted, no S3 or S4.    LUNGS:  Clear to auscultation bilaterally without rales/rhonchi/wheezing/retractions.  Symmetric chest rise on inhalation noted.    ABD:  Active bowel sounds, soft, non-tender/non-distended.  No rebound/guarding/rigidity.    EXT:  No edema.  No cyanosis.  No joint synovitis noted.Lower extremity pulses are normal bilaterally and     LGS: No cervical or axillary lymphadenopathy     SKIN:  Dry to touch, warm ,no exanthems  noted in the visualized areas.    Neurologic:Grossly intact,non focal . No acute focal neurologic deficit     Psychaitric exam:Mood and affect normal          Data:       All laboratory and imaging data in the past 24 hours reviewed     Results for orders placed or performed during the hospital encounter of 05/03/23   Soft tissue neck CT w contrast     Status: None    Narrative    CT SCAN OF THE NECK WITH CONTRAST  5/3/2023 1:18 PM     HISTORY: Posterior left cervical paraspinal muscle tenderness.    TECHNIQUE: Axial CT images of the neck following administration of  intravenous contrast with reformations. Radiation dose for this scan  was reduced using automated exposure control, adjustment of the mA  and/or kV according to patient size, or iterative reconstruction  technique. 125mL Isovue-370 per 2 exams IV.     COMPARISON: PET CT 8/8/2022.      FINDINGS: Slight nonspecific asymmetric soft tissue fullness within  the left oropharynx, presumably incidental (direct visualization could  be considered). The oral cavity, oropharynx, hypopharynx, and larynx  are otherwise unremarkable.    Abnormally enlarged and heterogeneously hyperenhancing left-sided  periparotid, level IIa, level IIb, level III, and level V lymph nodes  are present. Abnormally enlarged and hyperenhancing right-sided level  IIb, level IV, and level V lymph nodes. Lymphadenopathy appears to  have evolved compared to the prior PET/CT although comparison is  limited due to differences in technique.    Nonspecific enlarged subcutaneous lymph nodes involving the left upper  back/lower neck musculature with associated hazy inflammatory change  which may extend into the fascial layers along the posterior  paraspinal musculature (for example series 3 image 57), probably new  compared to the prior PET/CT.    Filling defect within the left internal jugular vein (series 3 image  78), with asymmetrically decreased opacification of the more inferior  left  internal jugular vein, left brachiocephalic vein, and left  subclavian vein.    Multiple hazy and irregular appearance superior mediastinal lymph  nodes are present. Right pleural effusion. Scattered nonspecific  pulmonary nodules/opacities (refer to chest report). Cervical spine  degenerative changes (refer to cervical spine report).      Impression    IMPRESSION:    1. Multiple abnormal cervical lymph nodes concerning for metastatic  disease. Associated perirenal inflammatory stranding is present -  superimposed infectious/reactive adenitis cannot be excluded.  2. Filling defect within the left internal jugular vein concerning for  deep vein thrombosis with poor opacification of the inferior internal  jugular vein, brachiocephalic vein, and left subclavian vein.  Ultrasound correlation could be performed.      ANA PAULA KU MD         SYSTEM ID:  [id number]   Cervical spine CT w/o contrast     Status: None    Narrative    CT CERVICAL SPINE WITHOUT CONTRAST   5/3/2023 1:19 PM     HISTORY: Focal C-spine tenderness, history of esophageal cancer.     TECHNIQUE: Axial images of the cervical spine were obtained without  intravenous contrast. Multiplanar reformations were performed.   Radiation dose for this scan was reduced using automated exposure  control, adjustment of the mA and/or kV according to patient size, or  iterative reconstruction technique.    COMPARISON: None.    FINDINGS: No fracture is identified. Multilevel mild degenerative disc  disease and mild facet arthropathy. Moderate facet arthropathy on the  left at C7-T1. No high-grade spinal canal or foraminal stenosis.  Cervical lymphadenopathy is present. Refer to soft tissue neck CT  report.      Impression    IMPRESSION:  1. Mild degenerative change.  2. No destructive or aggressive osseous lesions.  3. No high-grade stenoses.  4. Cervical lymphadenopathy. Refer to soft tissue neck report.    ANA PAULA KU MD         SYSTEM ID:  [id number]   CT  Chest/Abdomen/Pelvis w Contrast     Status: None    Narrative    CT CHEST/ABDOMEN/PELVIS W CONTRAST 5/3/2023 1:17 PM    CLINICAL HISTORY: hx of esophageal cancer, periumbilical abd pain and  tenderness    TECHNIQUE: CT scan of the chest, abdomen, and pelvis was performed  following injection of IV contrast. Multiplanar reformats were  obtained. Dose reduction techniques were used.   CONTRAST: 125mL Isovue-370 per 2 exams    COMPARISON: 10/19/2022    FINDINGS:   LUNGS AND PLEURA: Multifocal new areas of linear and nodular  intralobular septal thickening, predominantly in the right upper lobe,  and bilateral lower lobes, indeterminate but suspicious for  lymphangitic carcinomatosis. There may also be superimposed mild  pulmonary edema and infection. A few discrete new pulmonary nodules.  For example, a 16 mm nodule in the right lower lobe (series 5, image  121) is new. New small right pleural effusion and mild right basilar  atelectasis. A few previously seen pulmonary nodules are stable. For  example, a subpleural 7 mm left lower lobe nodule is unchanged (image  181). Increase linear soft tissue thickening around the right mainstem  bronchus and right upper, middle and lower lobar bronchi.    MEDIASTINUM/AXILLAE: Stable appearance of circumferential wall  thickening of the distal esophagus. Increased size of left axillary  lymph nodes. For example, 2 adjacent left axillary lymph nodes  measuring 9-12 mm (series 4, image 37) previously measured 4-5 mm.  Multiple small mediastinal lymph nodes have slightly increased in  size. For example, a 10 mm right precarinal lymph node previously  measured 5 mm. Lymphadenopathy/soft tissue thickening in the left  lateral neck measuring 12 x 16 mm (series 4, image 21) previously  measured 9 x 10 mm. Right IJ port catheter. No filling defects in the  central pulmonary arteries. No thoracic aortic aneurysm. Moderate  coronary artery calcifications.    HEPATOBILIARY: Metallic biliary  stent in place with decreased, mild  pneumobilia. Linear soft tissue thickening in the hepatic hilum and  linear enhancement of the common hepatic duct has increased. Findings  are suspicious for cholangitis and some strength obstruction. Few  scattered hypodense lesions in the liver are stable. Increased  distention of the gallbladder.    PANCREAS: A 1.9 cm mass in or abutting the pancreatic head (series 4,  image 146) is stable, previously measuring about 1.7 cm (series 4,  image 146). Stable mild prominence of the main pancreatic duct  throughout the pancreatic body measuring 5 mm. Mild peripancreatic  nonspecific fat stranding is stable.    SPLEEN: Normal.    ADRENAL GLANDS: New 2 cm nodule in the right adrenal gland and  increased thickening of the left adrenal gland without a discrete left  adrenal nodule.    KIDNEYS/BLADDER: Normal.    BOWEL: No small bowel or colonic obstruction or inflammatory changes.  Normal appendix. Previously seen colitis has resolved. Gastrostomy  tube has been removed.    PELVIC ORGANS: No pelvic masses.    ADDITIONAL FINDINGS: New retroperitoneal lymphadenopathy. For example,  there is a 13 mm left para-aortic lymph node (series 4, image 171),  previously measuring 6 mm and a few new pelvic lymph nodes including a  12 mm right external iliac lymph node (series 4, image 269),  previously measuring 6 mm.    No abdominal aortic aneurysm. Stable small pelvic ascites. No abscess  or free air.    MUSCULOSKELETAL: No destructive lesions in the bones.      Impression    IMPRESSION: Since 10/19/2022, overall disease progression  1.  Stable distal esophageal wall thickening.   2.  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 Possible  superimposed pulmonary edema and/or atypical pneumonia. New small  right pleural effusion.  3.  New/progressive  lymphadenopathy in the left axilla, visualized  lower neck, retroperitoneum and pelvis. Increased linear soft tissue  thickening around right sided bronchi may be due to worsening  lymphangitic carcinomatosis or lymphadenopathy.  4.  New 2 cm right adrenal nodule, likely metastatic.  5.  Increased soft tissue thickening in the hepatic hilum and around  intrahepatic bile ducts which may be due to cholangitis or metastases.  Pneumobilia has decreased suggesting possible stent malfunction.  Increased gallbladder distention.  6.  Stable 1.9 cm nodule/mass abutting the pancreatic head, likely a  peripancreatic metastatic lymph node.  7.  Mild nonspecific fat stranding around the pancreas is stable.  8.  Previously seen colitis has resolved.    SAEED MORRISON MD         SYSTEM ID:  [id number]   US Upper Extremity Venous Duplex Left     Status: None    Narrative    US UPPER EXTREMITY VENOUS DUPLEX LEFT 5/3/2023 3:55 PM    CLINICAL HISTORY: concern for L internal jugular clot on CT, further  eval  TECHNIQUE: Venous Duplex ultrasound of the left upper extremity with  (when possible) and without compression, augmentation, and duplex.  Color flow and spectral Doppler with waveform analysis performed.    COMPARISON: Same day neck CT    FINDINGS: Ultrasound includes evaluation of the internal jugular vein,  innominate vein, subclavian vein, axillary vein, and brachial vein.  The superficial cephalic and basilic veins were also evaluated where  seen.     LEFT: There is partially occlusive deep vein thrombus in the left  internal jugular vein which corresponds to findings on same day neck  CT. There is a likely medial collateral vein which appears patent.  Thrombus extends into the medial and mid subclavian vein where it is  completely occlusive. Lateral portion of the subclavian vein is  patent. There is also nonocclusive thrombus in the left axillary vein.  Nonocclusive thrombus in the left basilic vein in the upper arm.  Cephalic  vein is patent.      Impression    IMPRESSION:   1.  Deep vein thrombosis involving the left internal jugular,  subclavian and axial veins as above. Surrounding venous collaterals  noted.  2.  Nonocclusive superficial thrombus involving the basilic vein.    OMARI WAKEFIELD MD         SYSTEM ID:  [id number]   Comprehensive metabolic panel     Status: Abnormal   Result Value Ref Range    Sodium 137 136 - 145 mmol/L    Potassium 3.9 3.4 - 5.3 mmol/L    Chloride 104 98 - 107 mmol/L    Carbon Dioxide (CO2) 23 22 - 29 mmol/L    Anion Gap 10 7 - 15 mmol/L    Urea Nitrogen 8.5 8.0 - 23.0 mg/dL    Creatinine 0.66 (L) 0.67 - 1.17 mg/dL    Calcium 8.2 (L) 8.8 - 10.2 mg/dL    Glucose 107 (H) 70 - 99 mg/dL    Alkaline Phosphatase 151 (H) 40 - 129 U/L    AST 18 10 - 50 U/L    ALT 20 10 - 50 U/L    Protein Total 6.4 6.4 - 8.3 g/dL    Albumin 2.9 (L) 3.5 - 5.2 g/dL    Bilirubin Total 0.5 <=1.2 mg/dL    GFR Estimate >90 >60 mL/min/1.73m2   Lipase     Status: Abnormal   Result Value Ref Range    Lipase 862 (H) 13 - 60 U/L   UA with Microscopic reflex to Culture     Status: Abnormal    Specimen: Urine, Midstream   Result Value Ref Range    Color Urine Yellow Colorless, Straw, Light Yellow, Yellow    Appearance Urine Clear Clear    Glucose Urine Negative Negative mg/dL    Bilirubin Urine Negative Negative    Ketones Urine Negative Negative mg/dL    Specific Gravity Urine 1.015 1.003 - 1.035    Blood Urine Negative Negative    pH Urine 5.5 5.0 - 7.0    Protein Albumin Urine 30 (A) Negative mg/dL    Urobilinogen Urine Normal Normal, 2.0 mg/dL    Nitrite Urine Negative Negative    Leukocyte Esterase Urine Negative Negative    Bacteria Urine Moderate (A) None Seen /HPF    Mucus Urine Present (A) None Seen /LPF    RBC Urine 1 <=2 /HPF    WBC Urine 1 <=5 /HPF    Squamous Epithelials Urine 1 <=1 /HPF    Narrative    Urine Culture not indicated   [place] Draw     Status: None    Narrative    The following orders were created for panel order  [place] Draw.  Procedure                               Abnormality         Status                     ---------                               -----------         ------                     Extra Blue Top Tube[[id number]]                              Final result               Extra Green Top (Lithium...[[id number]]                      Final result                 Please view results for these tests on the individual orders.   CBC with platelets and differential     Status: Abnormal   Result Value Ref Range    WBC Count 16.5 (H) 4.0 - 11.0 10e3/uL    RBC Count 3.99 (L) 4.40 - 5.90 10e6/uL    Hemoglobin 12.2 (L) 13.3 - 17.7 g/dL    Hematocrit 38.2 (L) 40.0 - 53.0 %    MCV 96 78 - 100 fL    MCH 30.6 26.5 - 33.0 pg    MCHC 31.9 31.5 - 36.5 g/dL    RDW 15.2 (H) 10.0 - 15.0 %    Platelet Count 375 150 - 450 10e3/uL    % Neutrophils 87 %    % Lymphocytes 4 %    % Monocytes 7 %    % Eosinophils 1 %    % Basophils 0 %    % Immature Granulocytes 1 %    NRBCs per 100 WBC 0 <1 /100    Absolute Neutrophils 14.4 (H) 1.6 - 8.3 10e3/uL    Absolute Lymphocytes 0.7 (L) 0.8 - 5.3 10e3/uL    Absolute Monocytes 1.1 0.0 - 1.3 10e3/uL    Absolute Eosinophils 0.1 0.0 - 0.7 10e3/uL    Absolute Basophils 0.1 0.0 - 0.2 10e3/uL    Absolute Immature Granulocytes 0.2 <=0.4 10e3/uL    Absolute NRBCs 0.0 10e3/uL   Extra Blue Top Tube     Status: None   Result Value Ref Range    Hold Specimen JIC    Extra Green Top (Lithium Heparin) ON ICE     Status: None   Result Value Ref Range    Hold Specimen RECEIVED.    iStat Gases (lactate) venous, POCT     Status: Abnormal   Result Value Ref Range    Lactic Acid POCT 1.1 <=2.0 mmol/L    Bicarbonate Venous POCT 24 21 - 28 mmol/L    O2 Sat, Venous POCT 58 (L) 94 - 100 %    pCO2V Venous POCT 38 (L) 40 - 50 mm Hg    pH Venous POCT 7.41 7.32 - 7.43    pO2 Venous POCT 30 25 - 47 mm Hg   RBC and Platelet Morphology     Status: None   Result Value Ref Range    Platelet Assessment  Automated Count Confirmed.  Platelet morphology is normal.     Automated Count Confirmed. Platelet morphology is normal.    RBC Morphology Confirmed RBC Indices    CBC with platelets differential     Status: Abnormal    Narrative    The following orders were created for panel order CBC with platelets differential.  Procedure                               Abnormality         Status                     ---------                               -----------         ------                     CBC with platelets and d...[[id number]]  Abnormal            Final result               RBC and Platelet Morphology[[id number]]                      Final result                 Please view results for these tests on the individual orders.        Recent Results (from the past 48 hour(s))   CT Chest/Abdomen/Pelvis w Contrast    Narrative    CT CHEST/ABDOMEN/PELVIS W CONTRAST 5/3/2023 1:17 PM    CLINICAL HISTORY: hx of esophageal cancer, periumbilical abd pain and  tenderness    TECHNIQUE: CT scan of the chest, abdomen, and pelvis was performed  following injection of IV contrast. Multiplanar reformats were  obtained. Dose reduction techniques were used.   CONTRAST: 125mL Isovue-370 per 2 exams    COMPARISON: 10/19/2022    FINDINGS:   LUNGS AND PLEURA: Multifocal new areas of linear and nodular  intralobular septal thickening, predominantly in the right upper lobe,  and bilateral lower lobes, indeterminate but suspicious for  lymphangitic carcinomatosis. There may also be superimposed mild  pulmonary edema and infection. A few discrete new pulmonary nodules.  For example, a 16 mm nodule in the right lower lobe (series 5, image  121) is new. New small right pleural effusion and mild right basilar  atelectasis. A few previously seen pulmonary nodules are stable. For  example, a subpleural 7 mm left lower lobe nodule is unchanged (image  181). Increase linear soft tissue thickening around the right mainstem  bronchus and right upper, middle and lower lobar  bronchi.    MEDIASTINUM/AXILLAE: Stable appearance of circumferential wall  thickening of the distal esophagus. Increased size of left axillary  lymph nodes. For example, 2 adjacent left axillary lymph nodes  measuring 9-12 mm (series 4, image 37) previously measured 4-5 mm.  Multiple small mediastinal lymph nodes have slightly increased in  size. For example, a 10 mm right precarinal lymph node previously  measured 5 mm. Lymphadenopathy/soft tissue thickening in the left  lateral neck measuring 12 x 16 mm (series 4, image 21) previously  measured 9 x 10 mm. Right IJ port catheter. No filling defects in the  central pulmonary arteries. No thoracic aortic aneurysm. Moderate  coronary artery calcifications.    HEPATOBILIARY: Metallic biliary stent in place with decreased, mild  pneumobilia. Linear soft tissue thickening in the hepatic hilum and  linear enhancement of the common hepatic duct has increased. Findings  are suspicious for cholangitis and some strength obstruction. Few  scattered hypodense lesions in the liver are stable. Increased  distention of the gallbladder.    PANCREAS: A 1.9 cm mass in or abutting the pancreatic head (series 4,  image 146) is stable, previously measuring about 1.7 cm (series 4,  image 146). Stable mild prominence of the main pancreatic duct  throughout the pancreatic body measuring 5 mm. Mild peripancreatic  nonspecific fat stranding is stable.    SPLEEN: Normal.    ADRENAL GLANDS: New 2 cm nodule in the right adrenal gland and  increased thickening of the left adrenal gland without a discrete left  adrenal nodule.    KIDNEYS/BLADDER: Normal.    BOWEL: No small bowel or colonic obstruction or inflammatory changes.  Normal appendix. Previously seen colitis has resolved. Gastrostomy  tube has been removed.    PELVIC ORGANS: No pelvic masses.    ADDITIONAL FINDINGS: New retroperitoneal lymphadenopathy. For example,  there is a 13 mm left para-aortic lymph node (series 4, image  171),  previously measuring 6 mm and a few new pelvic lymph nodes including a  12 mm right external iliac lymph node (series 4, image 269),  previously measuring 6 mm.    No abdominal aortic aneurysm. Stable small pelvic ascites. No abscess  or free air.    MUSCULOSKELETAL: No destructive lesions in the bones.      Impression    IMPRESSION: Since 10/19/2022, overall disease progression  1.  Stable distal esophageal wall thickening.   2.  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 Possible  superimposed pulmonary edema and/or atypical pneumonia. New small  right pleural effusion.  3.  New/progressive lymphadenopathy in the left axilla, visualized  lower neck, retroperitoneum and pelvis. Increased linear soft tissue  thickening around right sided bronchi may be due to worsening  lymphangitic carcinomatosis or lymphadenopathy.  4.  New 2 cm right adrenal nodule, likely metastatic.  5.  Increased soft tissue thickening in the hepatic hilum and around  intrahepatic bile ducts which may be due to cholangitis or metastases.  Pneumobilia has decreased suggesting possible stent malfunction.  Increased gallbladder distention.  6.  Stable 1.9 cm nodule/mass abutting the pancreatic head, likely a  peripancreatic metastatic lymph node.  7.  Mild nonspecific fat stranding around the pancreas is stable.  8.  Previously seen colitis has resolved.    SAEED MORRISON MD         SYSTEM ID:  [id number]   Soft tissue neck CT w contrast    Narrative    CT SCAN OF THE NECK WITH CONTRAST  5/3/2023 1:18 PM     HISTORY: Posterior left cervical paraspinal muscle tenderness.    TECHNIQUE: Axial CT images of the neck following administration of  intravenous contrast with reformations. Radiation dose for this scan  was reduced using automated exposure control, adjustment of the mA  and/or kV according to  patient size, or iterative reconstruction  technique. 125mL Isovue-370 per 2 exams IV.     COMPARISON: PET CT 8/8/2022.      FINDINGS: Slight nonspecific asymmetric soft tissue fullness within  the left oropharynx, presumably incidental (direct visualization could  be considered). The oral cavity, oropharynx, hypopharynx, and larynx  are otherwise unremarkable.    Abnormally enlarged and heterogeneously hyperenhancing left-sided  periparotid, level IIa, level IIb, level III, and level V lymph nodes  are present. Abnormally enlarged and hyperenhancing right-sided level  IIb, level IV, and level V lymph nodes. Lymphadenopathy appears to  have evolved compared to the prior PET/CT although comparison is  limited due to differences in technique.    Nonspecific enlarged subcutaneous lymph nodes involving the left upper  back/lower neck musculature with associated hazy inflammatory change  which may extend into the fascial layers along the posterior  paraspinal musculature (for example series 3 image 57), probably new  compared to the prior PET/CT.    Filling defect within the left internal jugular vein (series 3 image  78), with asymmetrically decreased opacification of the more inferior  left internal jugular vein, left brachiocephalic vein, and left  subclavian vein.    Multiple hazy and irregular appearance superior mediastinal lymph  nodes are present. Right pleural effusion. Scattered nonspecific  pulmonary nodules/opacities (refer to chest report). Cervical spine  degenerative changes (refer to cervical spine report).      Impression    IMPRESSION:    1. Multiple abnormal cervical lymph nodes concerning for metastatic  disease. Associated perirenal inflammatory stranding is present -  superimposed infectious/reactive adenitis cannot be excluded.  2. Filling defect within the left internal jugular vein concerning for  deep vein thrombosis with poor opacification of the inferior internal  jugular vein, brachiocephalic  vein, and left subclavian vein.  Ultrasound correlation could be performed.      ANA PAULA KU MD         SYSTEM ID:  [id number]   Cervical spine CT w/o contrast    Narrative    CT CERVICAL SPINE WITHOUT CONTRAST   5/3/2023 1:19 PM     HISTORY: Focal C-spine tenderness, history of esophageal cancer.     TECHNIQUE: Axial images of the cervical spine were obtained without  intravenous contrast. Multiplanar reformations were performed.   Radiation dose for this scan was reduced using automated exposure  control, adjustment of the mA and/or kV according to patient size, or  iterative reconstruction technique.    COMPARISON: None.    FINDINGS: No fracture is identified. Multilevel mild degenerative disc  disease and mild facet arthropathy. Moderate facet arthropathy on the  left at C7-T1. No high-grade spinal canal or foraminal stenosis.  Cervical lymphadenopathy is present. Refer to soft tissue neck CT  report.      Impression    IMPRESSION:  1. Mild degenerative change.  2. No destructive or aggressive osseous lesions.  3. No high-grade stenoses.  4. Cervical lymphadenopathy. Refer to soft tissue neck report.    ANA PAULA KU MD         SYSTEM ID:  [id number]   US Upper Extremity Venous Duplex Left    Narrative    US UPPER EXTREMITY VENOUS DUPLEX LEFT 5/3/2023 3:55 PM    CLINICAL HISTORY: concern for L internal jugular clot on CT, further  eval  TECHNIQUE: Venous Duplex ultrasound of the left upper extremity with  (when possible) and without compression, augmentation, and duplex.  Color flow and spectral Doppler with waveform analysis performed.    COMPARISON: Same day neck CT    FINDINGS: Ultrasound includes evaluation of the internal jugular vein,  innominate vein, subclavian vein, axillary vein, and brachial vein.  The superficial cephalic and basilic veins were also evaluated where  seen.     LEFT: There is partially occlusive deep vein thrombus in the left  internal jugular vein which corresponds to findings on  same day neck  CT. There is a likely medial collateral vein which appears patent.  Thrombus extends into the medial and mid subclavian vein where it is  completely occlusive. Lateral portion of the subclavian vein is  patent. There is also nonocclusive thrombus in the left axillary vein.  Nonocclusive thrombus in the left basilic vein in the upper arm.  Cephalic vein is patent.      Impression    IMPRESSION:   1.  Deep vein thrombosis involving the left internal jugular,  subclavian and axial veins as above. Surrounding venous collaterals  noted.  2.  Nonocclusive superficial thrombus involving the basilic vein.    OMARI WAKEFIELD MD         SYSTEM ID:  [id number]        All imaging studies reviewed by me.       Patient`s old medical records reviewed and case discussed with the ED physician.    ED course-Reviewed  and care plan discussed with Dr. Rick Gaxiola

## 2023-05-04 NOTE — CONSULTS
Patient has Multiplan through an employer.    Xarelto/Eliquis:  $0/mo.     Yamila Friedman  Pharmacy Technician/Liaison, Discharge Pharmacy   [phone] (voice or text)  [email or web address]

## 2023-05-04 NOTE — PHARMACY-ADMISSION MEDICATION HISTORY
Pharmacist Admission Medication History    Admission medication history is complete. The information provided in this note is only as accurate as the sources available at the time of the update.    Medication reconciliation/reorder completed by provider prior to medication history? Yes    Information Source(s): Patient via in-person    Pertinent Information: patient confirmed currently not taking any blood thinner.    Changes made to PTA medication list:    Added: None    Deleted: old oxycodone entry, Xarelto starter pack    Changed: None    Medication Affordability:  Not including over the counter (OTC) medications, was there a time in the past 12 months when you did not take your medications as prescribed because of cost?: No    Allergies reviewed with patient and updates made in EHR: yes    Medication History Completed By: Rafael Mike RP 5/3/2023 7:25 PM    Prior to Admission medications    Medication Sig Last Dose Taking? Auth Provider Long Term End Date   acetaminophen (TYLENOL) 325 MG tablet Take 650 mg by mouth as needed Unknown Yes Reported, Patient     ondansetron (ZOFRAN) 8 MG tablet Take 1 tablet (8 mg) by mouth every 8 hours as needed for nausea 5/2/2023 Yes Maria Alejandra Arce,      oxyCODONE (ROXICODONE) 5 MG tablet take 1 tablet by mouth every 4 to 6 hours as needed for pain 5/3/2023 Yes Unknown, Entered By History     dexamethasone (DECADRON) 4 MG tablet Take 4 mg by mouth 2 times daily (with meals) 2-4 days after chemo   Reported, Patient Yes    prochlorperazine (COMPAZINE) 10 MG tablet Take 10 mg by mouth every 6 hours as needed for nausea or vomiting   Reported, Patient

## 2023-05-04 NOTE — CONSULTS
[facility] Cancer Care Consultation      Noah Morrison MRN# [id number]   YOB: 1965 Age: 58 year old   Date of Admission: 5/3/2023  Requesting physician: Allen Echeverria MD  Reason for consult: Metastatic cancer, DVT.           Assessment and Plan:   58 year old male with metastatic esophageal adenocarcinoma admitted with abdominal pain, found to have DVT and progressive cancer.    1. Metastatic esophageal adenocarcinoma  2. 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  He is due for his next chemo cycle in about a week.  --He intends to return to [place] as soon as possible and I encouraged him to do so to resume his chemo with Dr. Laughlin.    3. Left internal jugular, subclavian and axial DVT  --Related to hypercoagulability of malignancy.  --I advised transition from heparin drip to Eliquis or Xarelto.  Recommend pharmacy consult for cost comparison.  If cost is similar between these 2, would favor Eliquis.  --He will need long-term anticoagulation.    4. Abdominal pain  --Related to #1  --Continue Dilaudid as needed -- can transition to oral equivalent at discharge.    Thank you for the consult.  Please call if questions.    Mireya Garnett MD  Hematology/Oncology  [facility]    Total time spent: 80 minutes in chart review, patient evaluation, counseling, documentation, and coordination of care.               Chief Complaint:   Abdominal Pain           History of Present Illness:   This patient is a 58 year old male with metastatic esophageal adenocarcinoma (PDL1 CPS5, HER2 negative, JUNIOR), previously followed with Dr. Maria Alejandra Arce, and has since moved to [place], receiving palliative chemotherapy with Dr. Laughlin.  He is now admitted with central\epigastric pain.   He is currently visiting his sister here in [place] and intends to return to [place] very soon.    Oncologic history:  -6/30/22: Esophageal biopsy: poorly differentiated adenocarcinoma with focal signet ring morphology (HER2 0+; no evidence of MSI-H).  -7/12/22: CEA 21.1.  -8/8/22: PET scan: Distal esophageal thickening with intense hypermetabolic activity consistent with known esophgeal cancer. Abnormal hypermetabolic metastatic cervical, supraclavicular, peritoneal, and retroperitoneal LAD.   -8/9-8/23/22: Concurrent chemoradiation with weekly carboplatin plus paclitaxel.  -CARIS: PDL CPS5, ERBB2 negative, MSI stable, MMR proficient, TMB low, mutations identified in AXIN1, CDKN21, and TP53.  -Started on FOLFOX plus nivolumab on 9/15/22.   -In the last 1-2 weeks started ramucirumab (Cyramza) with paclitaxel and Neulasta support in [place] due to disease progression (with new lung metastases) under the care of Dr. Laughlin.    5/3/2023 CT C/A/P showed, compared to 10/19/2022:  1.  Stable distal esophageal wall thickening.   2.  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 Possible  superimposed pulmonary edema and/or atypical pneumonia. New small  right pleural effusion.  3.  New/progressive lymphadenopathy in the left axilla, visualized  lower neck, retroperitoneum and pelvis. Increased linear soft tissue  thickening around right sided bronchi may be due to worsening  lymphangitic carcinomatosis or lymphadenopathy.  4.  New 2 cm right adrenal nodule, likely metastatic.  5.  Increased soft tissue thickening in the hepatic hilum and around  intrahepatic bile ducts which may be due to cholangitis or metastases.  Pneumobilia has decreased suggesting possible stent malfunction.  Increased gallbladder distention.  6.  Stable 1.9 cm nodule/mass abutting the pancreatic  head, likely a  peripancreatic metastatic lymph node.  7.  Mild nonspecific fat stranding around the pancreas is stable.     We do not have the recent PET scan from [place] available for review.    5/3/2023 CT neck w\contrast:  1. Multiple abnormal cervical lymph nodes concerning for metastatic  disease. Associated perirenal inflammatory stranding is present -  superimposed infectious/reactive adenitis cannot be excluded.  2. Filling defect within the left internal jugular vein concerning for  deep vein thrombosis with poor opacification of the inferior internal  jugular vein, brachiocephalic vein, and left subclavian vein.    5/3/2023 Left upper extremity U/S:  1.  Deep vein thrombosis involving the left internal jugular,  subclavian and axial veins as above. Surrounding venous collaterals  noted.  2.  Nonocclusive superficial thrombus involving the basilic vein.    He was then started on heparin drip.  Currently, he reports pain is under good control with Dilaudid.             Physical Exam:   Vitals were reviewed  Blood pressure 113/72, pulse 82, temperature 98.7  F (37.1  C), temperature source Oral, resp. rate 18, weight 65 kg (143 lb 4.8 oz), SpO2 94 %.  Temperatures:  Current - Temp: 98.7  F (37.1  C); Max - Temp  Av.5  F (36.9  C)  Min: 97.7  F (36.5  C)  Max: 99.1  F (37.3  C)  Respiration range: Resp  Av.7  Min: 16  Max: 18  Pulse range: Pulse  Av.6  Min: 82  Max: 113  Blood pressure range: Systolic (24hrs), Av , Min:107 , Max:125   ; Diastolic (24hrs), Av, Min:71, Max:88    Pulse oximetry range: SpO2  Av.2 %  Min: 92 %  Max: 99 %  No intake or output data in the 24 hours ending 23 0945    GENERAL: No acute distress.  SKIN: No rashes or jaundice.  HEENT: Normocephalic, atraumatic. Eyes anicteric. Oropharynx is clear. Pain to palpation at bony prominence at lower posterior neck.  LYMPH: No palpable lymphadenopathy in the cervical, supraclavicular regions.  HEART: No PMI  displacement.  LUNGS: No audible cough or wheezing.  ABDOMEN: Soft, nontender, nondistended with no palpable hepatosplenomegaly.  EXTREMITIES: No clubbing, cyanosis, or edema.  MENTAL: Alert and oriented to person, place, and time.  NEURO: No focal motor deficits.              Past Medical History:   Metastatic esophageal adenocarcinoma.          Past Surgical History:   Tonsillectomy  Port placement  PEG tube placement            Social History:   I have reviewed this patient's social history  Social History     Tobacco Use     Smoking status: Former     Smokeless tobacco: Never   Vaping Use     Vaping status: Not on file   Substance Use Topics     Alcohol use: Yes             Family History:   Father: 84, living, esophageal cancer.   Mother: 80.         Allergies:   No Known Allergies          Medications:   I have reviewed this patient's current medications  Medications Prior to Admission   Medication Sig Dispense Refill Last Dose     acetaminophen (TYLENOL) 325 MG tablet Take 650 mg by mouth as needed   Unknown     ondansetron (ZOFRAN) 8 MG tablet Take 1 tablet (8 mg) by mouth every 8 hours as needed for nausea 30 tablet 1 5/2/2023     oxyCODONE (ROXICODONE) 5 MG tablet take 1 tablet by mouth every 4 to 6 hours as needed for pain   5/3/2023     dexamethasone (DECADRON) 4 MG tablet Take 4 mg by mouth 2 times daily (with meals) 2-4 days after chemo        prochlorperazine (COMPAZINE) 10 MG tablet Take 10 mg by mouth every 6 hours as needed for nausea or vomiting        Current Facility-Administered Medications Ordered in Epic   Medication Dose Route Frequency Last Rate Last Admin     dextrose 5% and 0.45% NaCl + KCl 20 mEq/L infusion   Intravenous Continuous 100 mL/hr at 05/04/23 0757 New Bag at 05/04/23 0757     heparin infusion 25,000 units in D5W 250 mL ANTICOAGULANT  0-5,000 Units/hr Intravenous Continuous 13 mL/hr at 05/04/23 0906 1,300 Units/hr at 05/04/23 0906     HYDROmorphone (PF) (DILAUDID) injection 0.3  mg  0.3 mg Intravenous Q3H PRN   0.3 mg at 05/04/23 0611     lidocaine (LMX4) cream   Topical Q1H PRN         lidocaine 1 % 0.1-1 mL  0.1-1 mL Other Q1H PRN         melatonin tablet 1 mg  1 mg Oral At Bedtime PRN         morphine (PF) injection 4 mg  4 mg Intravenous Q15 Min PRN   4 mg at 05/03/23 1828     naloxone (NARCAN) injection 0.2 mg  0.2 mg Intravenous Q2 Min PRN        Or     naloxone (NARCAN) injection 0.4 mg  0.4 mg Intravenous Q2 Min PRN        Or     naloxone (NARCAN) injection 0.2 mg  0.2 mg Intramuscular Q2 Min PRN        Or     naloxone (NARCAN) injection 0.4 mg  0.4 mg Intramuscular Q2 Min PRN         ondansetron (ZOFRAN ODT) ODT tab 4 mg  4 mg Oral Q6H PRN        Or     ondansetron (ZOFRAN) injection 4 mg  4 mg Intravenous Q6H PRN         Patient is already receiving anticoagulation with heparin, enoxaparin (LOVENOX), warfarin (COUMADIN)  or other anticoagulant medication   Does not apply Continuous PRN         prochlorperazine (COMPAZINE) injection 10 mg  10 mg Intravenous Q6H PRN        Or     prochlorperazine (COMPAZINE) tablet 10 mg  10 mg Oral Q6H PRN        Or     prochlorperazine (COMPAZINE) suppository 25 mg  25 mg Rectal Q12H PRN         sodium chloride (PF) 0.9% PF flush 3 mL  3 mL Intracatheter Q8H   3 mL at 05/03/23 2134     sodium chloride (PF) 0.9% PF flush 3 mL  3 mL Intracatheter q1 min prn         No current [facility]-ordered outpatient medications on file.             Review of Systems:     The 14 point Review of Systems is negative other than noted in the HPI.            Data:   All laboratory data reviewed  Results for orders placed or performed during the hospital encounter of 05/03/23 (from the past 24 hour(s))   CBC with platelets differential    Narrative    The following orders were created for panel order CBC with platelets differential.  Procedure                               Abnormality         Status                     ---------                               -----------          ------                     CBC with platelets and d...[[id number]]  Abnormal            Final result               RBC and Platelet Morphology[[id number]]                      Final result                 Please view results for these tests on the individual orders.   Comprehensive metabolic panel   Result Value Ref Range    Sodium 137 136 - 145 mmol/L    Potassium 3.9 3.4 - 5.3 mmol/L    Chloride 104 98 - 107 mmol/L    Carbon Dioxide (CO2) 23 22 - 29 mmol/L    Anion Gap 10 7 - 15 mmol/L    Urea Nitrogen 8.5 8.0 - 23.0 mg/dL    Creatinine 0.66 (L) 0.67 - 1.17 mg/dL    Calcium 8.2 (L) 8.8 - 10.2 mg/dL    Glucose 107 (H) 70 - 99 mg/dL    Alkaline Phosphatase 151 (H) 40 - 129 U/L    AST 18 10 - 50 U/L    ALT 20 10 - 50 U/L    Protein Total 6.4 6.4 - 8.3 g/dL    Albumin 2.9 (L) 3.5 - 5.2 g/dL    Bilirubin Total 0.5 <=1.2 mg/dL    GFR Estimate >90 >60 mL/min/1.73m2   Lipase   Result Value Ref Range    Lipase 862 (H) 13 - 60 U/L   [place] Draw    Narrative    The following orders were created for panel order [place] Draw.  Procedure                               Abnormality         Status                     ---------                               -----------         ------                     Extra Blue Top Tube[[id number]]                              Final result               Extra Green Top (Lithium...[[id number]]                      Final result                 Please view results for these tests on the individual orders.   CBC with platelets and differential   Result Value Ref Range    WBC Count 16.5 (H) 4.0 - 11.0 10e3/uL    RBC Count 3.99 (L) 4.40 - 5.90 10e6/uL    Hemoglobin 12.2 (L) 13.3 - 17.7 g/dL    Hematocrit 38.2 (L) 40.0 - 53.0 %    MCV 96 78 - 100 fL    MCH 30.6 26.5 - 33.0 pg    MCHC 31.9 31.5 - 36.5 g/dL    RDW 15.2 (H) 10.0 - 15.0 %    Platelet Count 375 150 - 450 10e3/uL    % Neutrophils 87 %    % Lymphocytes 4 %    % Monocytes 7 %    % Eosinophils 1 %    % Basophils 0 %    % Immature Granulocytes  1 %    NRBCs per 100 WBC 0 <1 /100    Absolute Neutrophils 14.4 (H) 1.6 - 8.3 10e3/uL    Absolute Lymphocytes 0.7 (L) 0.8 - 5.3 10e3/uL    Absolute Monocytes 1.1 0.0 - 1.3 10e3/uL    Absolute Eosinophils 0.1 0.0 - 0.7 10e3/uL    Absolute Basophils 0.1 0.0 - 0.2 10e3/uL    Absolute Immature Granulocytes 0.2 <=0.4 10e3/uL    Absolute NRBCs 0.0 10e3/uL   Extra Blue Top Tube   Result Value Ref Range    Hold Specimen JI    Extra Green Top (Lithium Heparin) ON ICE   Result Value Ref Range    Hold Specimen RECEIVED.    RBC and Platelet Morphology   Result Value Ref Range    Platelet Assessment  Automated Count Confirmed. Platelet morphology is normal.     Automated Count Confirmed. Platelet morphology is normal.    RBC Morphology Confirmed RBC Indices    iStat Gases (lactate) venous, POCT   Result Value Ref Range    Lactic Acid POCT 1.1 <=2.0 mmol/L    Bicarbonate Venous POCT 24 21 - 28 mmol/L    O2 Sat, Venous POCT 58 (L) 94 - 100 %    pCO2V Venous POCT 38 (L) 40 - 50 mm Hg    pH Venous POCT 7.41 7.32 - 7.43    pO2 Venous POCT 30 25 - 47 mm Hg   CT Chest/Abdomen/Pelvis w Contrast    Narrative    CT CHEST/ABDOMEN/PELVIS W CONTRAST 5/3/2023 1:17 PM    CLINICAL HISTORY: hx of esophageal cancer, periumbilical abd pain and  tenderness    TECHNIQUE: CT scan of the chest, abdomen, and pelvis was performed  following injection of IV contrast. Multiplanar reformats were  obtained. Dose reduction techniques were used.   CONTRAST: 125mL Isovue-370 per 2 exams    COMPARISON: 10/19/2022    FINDINGS:   LUNGS AND PLEURA: Multifocal new areas of linear and nodular  intralobular septal thickening, predominantly in the right upper lobe,  and bilateral lower lobes, indeterminate but suspicious for  lymphangitic carcinomatosis. There may also be superimposed mild  pulmonary edema and infection. A few discrete new pulmonary nodules.  For example, a 16 mm nodule in the right lower lobe (series 5, image  121) is new. New small right pleural  effusion and mild right basilar  atelectasis. A few previously seen pulmonary nodules are stable. For  example, a subpleural 7 mm left lower lobe nodule is unchanged (image  181). Increase linear soft tissue thickening around the right mainstem  bronchus and right upper, middle and lower lobar bronchi.    MEDIASTINUM/AXILLAE: Stable appearance of circumferential wall  thickening of the distal esophagus. Increased size of left axillary  lymph nodes. For example, 2 adjacent left axillary lymph nodes  measuring 9-12 mm (series 4, image 37) previously measured 4-5 mm.  Multiple small mediastinal lymph nodes have slightly increased in  size. For example, a 10 mm right precarinal lymph node previously  measured 5 mm. Lymphadenopathy/soft tissue thickening in the left  lateral neck measuring 12 x 16 mm (series 4, image 21) previously  measured 9 x 10 mm. Right IJ port catheter. No filling defects in the  central pulmonary arteries. No thoracic aortic aneurysm. Moderate  coronary artery calcifications.    HEPATOBILIARY: Metallic biliary stent in place with decreased, mild  pneumobilia. Linear soft tissue thickening in the hepatic hilum and  linear enhancement of the common hepatic duct has increased. Findings  are suspicious for cholangitis and some strength obstruction. Few  scattered hypodense lesions in the liver are stable. Increased  distention of the gallbladder.    PANCREAS: A 1.9 cm mass in or abutting the pancreatic head (series 4,  image 146) is stable, previously measuring about 1.7 cm (series 4,  image 146). Stable mild prominence of the main pancreatic duct  throughout the pancreatic body measuring 5 mm. Mild peripancreatic  nonspecific fat stranding is stable.    SPLEEN: Normal.    ADRENAL GLANDS: New 2 cm nodule in the right adrenal gland and  increased thickening of the left adrenal gland without a discrete left  adrenal nodule.    KIDNEYS/BLADDER: Normal.    BOWEL: No small bowel or colonic obstruction or  inflammatory changes.  Normal appendix. Previously seen colitis has resolved. Gastrostomy  tube has been removed.    PELVIC ORGANS: No pelvic masses.    ADDITIONAL FINDINGS: New retroperitoneal lymphadenopathy. For example,  there is a 13 mm left para-aortic lymph node (series 4, image 171),  previously measuring 6 mm and a few new pelvic lymph nodes including a  12 mm right external iliac lymph node (series 4, image 269),  previously measuring 6 mm.    No abdominal aortic aneurysm. Stable small pelvic ascites. No abscess  or free air.    MUSCULOSKELETAL: No destructive lesions in the bones.      Impression    IMPRESSION: Since 10/19/2022, overall disease progression  1.  Stable distal esophageal wall thickening.   2.  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 Possible  superimposed pulmonary edema and/or atypical pneumonia. New small  right pleural effusion.  3.  New/progressive lymphadenopathy in the left axilla, visualized  lower neck, retroperitoneum and pelvis. Increased linear soft tissue  thickening around right sided bronchi may be due to worsening  lymphangitic carcinomatosis or lymphadenopathy.  4.  New 2 cm right adrenal nodule, likely metastatic.  5.  Increased soft tissue thickening in the hepatic hilum and around  intrahepatic bile ducts which may be due to cholangitis or metastases.  Pneumobilia has decreased suggesting possible stent malfunction.  Increased gallbladder distention.  6.  Stable 1.9 cm nodule/mass abutting the pancreatic head, likely a  peripancreatic metastatic lymph node.  7.  Mild nonspecific fat stranding around the pancreas is stable.  8.  Previously seen colitis has resolved.    SAEED MORRISON MD         SYSTEM ID:  [id number]   Soft tissue neck CT w contrast    Narrative    CT SCAN OF THE NECK WITH CONTRAST  5/3/2023 1:18 PM     HISTORY:  Posterior left cervical paraspinal muscle tenderness.    TECHNIQUE: Axial CT images of the neck following administration of  intravenous contrast with reformations. Radiation dose for this scan  was reduced using automated exposure control, adjustment of the mA  and/or kV according to patient size, or iterative reconstruction  technique. 125mL Isovue-370 per 2 exams IV.     COMPARISON: PET CT 8/8/2022.      FINDINGS: Slight nonspecific asymmetric soft tissue fullness within  the left oropharynx, presumably incidental (direct visualization could  be considered). The oral cavity, oropharynx, hypopharynx, and larynx  are otherwise unremarkable.    Abnormally enlarged and heterogeneously hyperenhancing left-sided  periparotid, level IIa, level IIb, level III, and level V lymph nodes  are present. Abnormally enlarged and hyperenhancing right-sided level  IIb, level IV, and level V lymph nodes. Lymphadenopathy appears to  have evolved compared to the prior PET/CT although comparison is  limited due to differences in technique.    Nonspecific enlarged subcutaneous lymph nodes involving the left upper  back/lower neck musculature with associated hazy inflammatory change  which may extend into the fascial layers along the posterior  paraspinal musculature (for example series 3 image 57), probably new  compared to the prior PET/CT.    Filling defect within the left internal jugular vein (series 3 image  78), with asymmetrically decreased opacification of the more inferior  left internal jugular vein, left brachiocephalic vein, and left  subclavian vein.    Multiple hazy and irregular appearance superior mediastinal lymph  nodes are present. Right pleural effusion. Scattered nonspecific  pulmonary nodules/opacities (refer to chest report). Cervical spine  degenerative changes (refer to cervical spine report).      Impression    IMPRESSION:    1. Multiple abnormal cervical lymph nodes concerning for metastatic  disease.  Associated perirenal inflammatory stranding is present -  superimposed infectious/reactive adenitis cannot be excluded.  2. Filling defect within the left internal jugular vein concerning for  deep vein thrombosis with poor opacification of the inferior internal  jugular vein, brachiocephalic vein, and left subclavian vein.  Ultrasound correlation could be performed.      ANA PAULA KU MD         SYSTEM ID:  [id number]   Cervical spine CT w/o contrast    Narrative    CT CERVICAL SPINE WITHOUT CONTRAST   5/3/2023 1:19 PM     HISTORY: Focal C-spine tenderness, history of esophageal cancer.     TECHNIQUE: Axial images of the cervical spine were obtained without  intravenous contrast. Multiplanar reformations were performed.   Radiation dose for this scan was reduced using automated exposure  control, adjustment of the mA and/or kV according to patient size, or  iterative reconstruction technique.    COMPARISON: None.    FINDINGS: No fracture is identified. Multilevel mild degenerative disc  disease and mild facet arthropathy. Moderate facet arthropathy on the  left at C7-T1. No high-grade spinal canal or foraminal stenosis.  Cervical lymphadenopathy is present. Refer to soft tissue neck CT  report.      Impression    IMPRESSION:  1. Mild degenerative change.  2. No destructive or aggressive osseous lesions.  3. No high-grade stenoses.  4. Cervical lymphadenopathy. Refer to soft tissue neck report.    ANA PAULA KU MD         SYSTEM ID:  [id number]   UA with Microscopic reflex to Culture    Specimen: Urine, Midstream   Result Value Ref Range    Color Urine Yellow Colorless, Straw, Light Yellow, Yellow    Appearance Urine Clear Clear    Glucose Urine Negative Negative mg/dL    Bilirubin Urine Negative Negative    Ketones Urine Negative Negative mg/dL    Specific Gravity Urine 1.015 1.003 - 1.035    Blood Urine Negative Negative    pH Urine 5.5 5.0 - 7.0    Protein Albumin Urine 30 (A) Negative mg/dL    Urobilinogen  Urine Normal Normal, 2.0 mg/dL    Nitrite Urine Negative Negative    Leukocyte Esterase Urine Negative Negative    Bacteria Urine Moderate (A) None Seen /HPF    Mucus Urine Present (A) None Seen /LPF    RBC Urine 1 <=2 /HPF    WBC Urine 1 <=5 /HPF    Squamous Epithelials Urine 1 <=1 /HPF    Narrative    Urine Culture not indicated   US Upper Extremity Venous Duplex Left    Narrative    US UPPER EXTREMITY VENOUS DUPLEX LEFT 5/3/2023 3:55 PM    CLINICAL HISTORY: concern for L internal jugular clot on CT, further  eval  TECHNIQUE: Venous Duplex ultrasound of the left upper extremity with  (when possible) and without compression, augmentation, and duplex.  Color flow and spectral Doppler with waveform analysis performed.    COMPARISON: Same day neck CT    FINDINGS: Ultrasound includes evaluation of the internal jugular vein,  innominate vein, subclavian vein, axillary vein, and brachial vein.  The superficial cephalic and basilic veins were also evaluated where  seen.     LEFT: There is partially occlusive deep vein thrombus in the left  internal jugular vein which corresponds to findings on same day neck  CT. There is a likely medial collateral vein which appears patent.  Thrombus extends into the medial and mid subclavian vein where it is  completely occlusive. Lateral portion of the subclavian vein is  patent. There is also nonocclusive thrombus in the left axillary vein.  Nonocclusive thrombus in the left basilic vein in the upper arm.  Cephalic vein is patent.      Impression    IMPRESSION:   1.  Deep vein thrombosis involving the left internal jugular,  subclavian and axial veins as above. Surrounding venous collaterals  noted.  2.  Nonocclusive superficial thrombus involving the basilic vein.    OMARI WAKEFIELD MD         SYSTEM ID:  [id number]   Blood Culture Arm, Left    Specimen: Arm, Left; Blood   Result Value Ref Range    Culture No growth after 12 hours    Blood Culture Peripheral Blood    Specimen:  Peripheral Blood   Result Value Ref Range    Culture No growth after 12 hours    Heparin Unfractionated Anti Xa Level   Result Value Ref Range    Anti Xa Unfractionated Heparin 0.29 For Reference Range, See Comment IU/mL    Narrative    Therapeutic Range: UFH: 0.25-0.50 IU/mL for low intensity dosing,  0.30-0.70 IU/mL for high intensity dosing DVT and PE.  This test is not validated for other direct factor X inhibitors (e.g. rivaroxaban, apixaban, edoxaban, betrixaban, fondaparinux) and should not be used for monitoring of other medications.   CBC with platelets   Result Value Ref Range    WBC Count 14.5 (H) 4.0 - 11.0 10e3/uL    RBC Count 3.61 (L) 4.40 - 5.90 10e6/uL    Hemoglobin 10.9 (L) 13.3 - 17.7 g/dL    Hematocrit 34.6 (L) 40.0 - 53.0 %    MCV 96 78 - 100 fL    MCH 30.2 26.5 - 33.0 pg    MCHC 31.5 31.5 - 36.5 g/dL    RDW 15.3 (H) 10.0 - 15.0 %    Platelet Count 320 150 - 450 10e3/uL   Heparin Unfractionated Anti Xa Level   Result Value Ref Range    Anti Xa Unfractionated Heparin 0.61 For Reference Range, See Comment IU/mL    Narrative    Therapeutic Range: UFH: 0.25-0.50 IU/mL for low intensity dosing,  0.30-0.70 IU/mL for high intensity dosing DVT and PE.  This test is not validated for other direct factor X inhibitors (e.g. rivaroxaban, apixaban, edoxaban, betrixaban, fondaparinux) and should not be used for monitoring of other medications.

## 2023-05-04 NOTE — PLAN OF CARE
To Do:  End of Shift Summary  For vital signs and complete assessments, please see documentation flowsheets.     Pertinent assessments: bowel sounds normoactive, pain 4-6 this shift, endorses pain medications work well. No significant changes to pt condition. Independent in room. Calls appropriately.     Major Shift Events: Blood return re-established after none seen, Heparin bolus 1950 @ 0150 for Xa of 0.29. Rate increase to 1300 units per hour with rate of 13mL/hr.     Treatment Plan: Continue Heparin IV @ 1300 units, 13 ml/hr, dilaudid for pain Q3 as needed    Cluster cares in effect to promote sleep and reduce risks for delirium. RN will continue to maintain care and the treatment plan this shift, handing off care to oncoming RN at 0700 5/4/2023      Bedside Nurse: Lakshmi Garza RN

## 2023-05-04 NOTE — CONSULTS
[facility] ACUTE PAIN SERVICE CONSULTATION   [facility]   Text Page Pain Via [employer] [facility]    Date of Admission:  5/3/2023  Date of Consult (When I saw the patient): 05/04/23  Physician requesting consult: Zana Mckenzie MD  Service: hospitalist  Reason for consult:  Acute pain management in cancer with disease progression     Assessment/Plan:     Noah Morrison is a 58 year old male who was admitted on 5/3/2023 with leukocytosis and elevated lipase.  I was asked to see the patient for abdominal pain management  with pain type visceral. In setting of metastatic esophageal cancer to lymph nodes of left axilla, lower neck retroperitoneum and pelvis . Pain is ongoing located in epigastric, RUQ. Describes pain as sharp and hollow  2-7/10. The patient denies shortness of breath, dysphagia, chest pain confusion. The patient does not smoke and  nochemical dependency history. Opioid tolerance is  Low .     Opioid Induced Respiratory Depression Risk Assessment: moderate  (Low 0-1; Moderate 2-4; or High >4 or >/= 3 if two of the risk factors are age > 60 and opioid naive) due to the following risk factors: AXEL, COPD/Asthma/pulmonary disease, CHF, renal dysfunction, hepatic dysfunction, Obesity, Smoker, Age>60, >2 opioid therapies, concomitant CNS depressants, opioid naive status, or post surgical ?   Chronic opioid use = 0 mg MME, opioid used this past 24 hours 24 mg MME  .   PLAN:   1) Treatment plan includes multimodal pain approach, medications as listed below, reviewed.  Discharge medications, advised keeping track of doses, educated on tapering off as pain improves, watch for constipation and stool softeners, no driving or alcohol with opioids, store medications in a safe place, advised to take no more than the prescribed dose as increased doses may cause respiratory depression or death. Patient is understanding of the plan. All questions and concerns addressed to patient's satisfaction.  He  "is hopeful to return soon to [place] to resume scheduled palliative chemotherapy scheduled for May 11.    2)Multimodal Medication Therapy  Topical:  Lidocaine patch 4% Apply to RUQ at hs .   Adjuvants:  CrCl is  normal mg/dl and Tylenol  650 mg every 8 hrs \", Hydroxyzine 25-50 mg every 6 hrs\" Gabapentin 200 mg tid , Muscle relaxer's  not indicated. \"  Antidepressants/anxiolytics:none  Opioids:  Hydromorphone(Dilaudid)  4-6 mg every 3 hrs prn   IV Pain medication: Dilaudid 0.3 mg every 2 hrs prn   3)Non-medication interventions- Ice, Heat, physical therapy, distraction aroma therapy  4)Constipation Prophylaxis- senna and miralax  . Continue to monitor.   5) Follow up   -acute pain team will   continue to follow   -Opioid prescriber has been  David Argueta MD . PCP is No Ref-Primary, Physician.    -Discharge Recommendations - We recommend prescribing the following at the time of discharge:   Dilaudid 4 mg every 4 hrs prn  Gabapentin 200 mg tid  Hydroxyzine 25-50 mg tid prn     Disposition: home     Prescribing at discharge: hospitalist        History of Present Illness (HPI):       Noah Morrison is a 58 year old old male with esophogeal cancer with progression of lymph node spread admitted for leukocytosis and elevated lipase who has persistent abdominal pain. Soft tissue thickening in the hepatic hilum and intrhepatic bile ducts with increase gallbladder distension is also noted on CT imaging  He receives his oncology care in [place] with first treatment April 18th. He visits [place] and in past had some cancer care with [facility]. The patient reports pain that is in the RUQ and does not radiates. Prior to treatment he had significant dysphagia, weight loss.  He now has no dyspahgia but does report weight loss of 20 lbs. Current pain is rated at 2/10 and goal is 2/10.  The patient does not have history of chronic pain The patient has a low opioid tolerance. Opioid induced side effects including sedation, respiratory " suppression, nausea, and constipation are not appreciated. The patient does not history of AXEL no, substance use disorder.     Discussed multimodal interventions, interventions other than systemic pharmacologic treatments for pain    Review of medical record/Summary of labs and care everywhere.      Past pain treatments have included chemotherapy, opioids    Last UDS  NA          [place] -pulled from system on 05/04/23 not minimal use of opioids.       Medical History   has no past medical history on file.       Surgical History   has no past surgical history on file.     Allergies   No Known Allergies     Current Home Medications   Prior to Admission medications    Medication Sig Start Date End Date Taking? Authorizing Provider   acetaminophen (TYLENOL) 325 MG tablet Take 650 mg by mouth as needed   Yes Reported, Patient   ondansetron (ZOFRAN) 8 MG tablet Take 1 tablet (8 mg) by mouth every 8 hours as needed for nausea 10/12/22  Yes Maria Alejandra Arce,    oxyCODONE (ROXICODONE) 5 MG tablet take 1 tablet by mouth every 4 to 6 hours as needed for pain 4/26/23  Yes Unknown, Entered By History   dexamethasone (DECADRON) 4 MG tablet Take 4 mg by mouth 2 times daily (with meals) 2-4 days after chemo    Reported, Patient   prochlorperazine (COMPAZINE) 10 MG tablet Take 10 mg by mouth every 6 hours as needed for nausea or vomiting    Reported, Patient          Social History  Reviewed, and he  reports that he has quit smoking. He has never used smokeless tobacco. He reports current alcohol use. He reports current drug use. Drug: Marijuana.      Family History- Reviewed care everywhere to find family history Nothing relevant to pain consult    Reviewed, and family history is not on file.    Review of Systems  Complete ROS reviewed, unless noted  , all other systems reviewed (with patient) and all others found to be negative.         Objective:     Vitals:  B/P: 118/73, T: 97.9, P: 99, R: 18     Weight:   143 lbs 4.78 oz  Body  mass index is 21.79 kg/m .      Physical Exam:     General Appearance:  Alert, cooperative, no distress, appears stated age. Is pleasent     Head:  Normocephalic, without obvious abnormality, atraumatic   Eyes:  Pupils are  Reactive and midposition    ENT/Throat: Lips moist    Lymph/Neck: Supple, symmetrical, trachea midline, no adenopathy, thyroid: not enlarged, symmetric    Lungs:   Clear to auscultation bilaterally, respirations unlabored   Chest Wall:  No tenderness or deformity   Cardiovascular/Heart:  Regular rate and rhythm, S1, S2    Abdomen:   Soft, non-tender, bowel sounds active all four quadrants,  no masses, no organomegaly   Musculoskeletal: Extremities normal, atraumatic  Incision none    Skin: Skin color good, lesions  None    Neurologic: Alert and oriented X 3, Moves all 4 extremities          Imaging Reviewed Personally By Myself      Results for orders placed or performed during the hospital encounter of 05/03/23   Soft tissue neck CT w contrast    Impression    IMPRESSION:    1. Multiple abnormal cervical lymph nodes concerning for metastatic  disease. Associated perirenal inflammatory stranding is present -  superimposed infectious/reactive adenitis cannot be excluded.  2. Filling defect within the left internal jugular vein concerning for  deep vein thrombosis with poor opacification of the inferior internal  jugular vein, brachiocephalic vein, and left subclavian vein.  Ultrasound correlation could be performed.      ANA PAULA KU MD         SYSTEM ID:  [id number]   Cervical spine CT w/o contrast    Impression    IMPRESSION:  1. Mild degenerative change.  2. No destructive or aggressive osseous lesions.  3. No high-grade stenoses.  4. Cervical lymphadenopathy. Refer to soft tissue neck report.    ANA PAULA KU MD         SYSTEM ID:  [id number]   CT Chest/Abdomen/Pelvis w Contrast    Impression    IMPRESSION: Since 10/19/2022, overall disease progression  1.  Stable distal esophageal wall  thickening.   2.  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 Possible  superimposed pulmonary edema and/or atypical pneumonia. New small  right pleural effusion.  3.  New/progressive lymphadenopathy in the left axilla, visualized  lower neck, retroperitoneum and pelvis. Increased linear soft tissue  thickening around right sided bronchi may be due to worsening  lymphangitic carcinomatosis or lymphadenopathy.  4.  New 2 cm right adrenal nodule, likely metastatic.  5.  Increased soft tissue thickening in the hepatic hilum and around  intrahepatic bile ducts which may be due to cholangitis or metastases.  Pneumobilia has decreased suggesting possible stent malfunction.  Increased gallbladder distention.  6.  Stable 1.9 cm nodule/mass abutting the pancreatic head, likely a  peripancreatic metastatic lymph node.  7.  Mild nonspecific fat stranding around the pancreas is stable.  8.  Previously seen colitis has resolved.    SAEED MORRISON MD         SYSTEM ID:  [id number]   US Upper Extremity Venous Duplex Left    Impression    IMPRESSION:   1.  Deep vein thrombosis involving the left internal jugular,  subclavian and axial veins as above. Surrounding venous collaterals  noted.  2.  Nonocclusive superficial thrombus involving the basilic vein.    OMARI WAKEFIELD MD         SYSTEM ID:  [id number]        Labs Reviewed Personally By Myself    Sodium   Date Value Ref Range Status   05/04/2023 133 (L) 136 - 145 mmol/L Final     Potassium   Date Value Ref Range Status   05/04/2023 4.0 3.4 - 5.3 mmol/L Final   11/01/2022 3.9 3.4 - 5.3 mmol/L Final     Chloride   Date Value Ref Range Status   05/04/2023 99 98 - 107 mmol/L Final   11/01/2022 106 94 - 109 mmol/L Final     Carbon Dioxide (CO2)   Date Value Ref Range Status   05/04/2023 22 22 - 29 mmol/L Final   11/01/2022 29 20 - 32 mmol/L  Final     Anion Gap   Date Value Ref Range Status   05/04/2023 12 7 - 15 mmol/L Final   11/01/2022 3 3 - 14 mmol/L Final     Glucose   Date Value Ref Range Status   05/04/2023 122 (H) 70 - 99 mg/dL Final   11/01/2022 139 (H) 70 - 99 mg/dL Final     Urea Nitrogen   Date Value Ref Range Status   05/04/2023 6.0 (L) 8.0 - 23.0 mg/dL Final   11/01/2022 13 7 - 30 mg/dL Final     Creatinine   Date Value Ref Range Status   05/04/2023 0.66 (L) 0.67 - 1.17 mg/dL Final     GFR Estimate   Date Value Ref Range Status   05/04/2023 >90 >60 mL/min/1.73m2 Final     Comment:     eGFR calculated using 2021 CKD-EPI equation.     Calcium   Date Value Ref Range Status   05/04/2023 8.6 (L) 8.8 - 10.2 mg/dL Final            Please see A&P for additional details of medical decision making.  MANAGEMENT DISCUSSED with the following over the past 24 hours: bedside nurse    Tests REVIEWED in the past 24 hours:  - CMP  - CBC  - Lipase  - Imaging  Medical complexity over the past 24 hours:  - Parenteral (IV) CONTROLLED SUBSTANCES ordered  - Decision regarding ESCALATION OF LEVEL OF CARE  - Prescription DRUG MANAGEMENT performed  60 MINUTES SPENT BY ME on the date of service doing chart review, history, exam, documentation & further activities per the note.    Thank you for this consultation.      Adrienne Sullivan RN, PGMT-BC APRN,CNP,ACHPN   Acute Pain Team ( [facility]) 8-4:30 after 3:30 page house officer   No weekend coverage   [facility] Paging/Directory Pain  Securely message with the Vocera Web Console (learn more here)

## 2023-05-04 NOTE — PLAN OF CARE
Goal Outcome Evaluation:    End of Shift Summary  For vital signs and complete assessments, please see documentation flowsheets.     Pertinent assessments:   A&Ox4. Up independently, steady, denies dizziness. Abd pain well controlled w/ scheduled tylenol, gabapentin, atarax, IV and PO dilaudid. Tolerating full liquid diet.     Major Shift Events: Heparin gtt continued at 1300u/hr. Xa recheck in am.     Treatment Plan: Pain management, diet advancement. Heparin gtt.

## 2023-05-04 NOTE — PROGRESS NOTES
[facility]    Medicine Progress Note - Hospitalist Service    Date of Admission:  5/3/2023    Assessment & Plan     58-year-old male who lives in [place] and is visiting his sister in [place].  He has history of metastatic esophageal cancer and is on palliative chemotherapy.  He presented to ER with acute on chronic abdominal pain.  He was hemodynamically stable and CT abdomen showed stable distal esophageal thickening with nodular opacities in the lung suspicious for lymphangitic carcinomatosis or pulmonary mets, adrenal nodules, hepatic metastasis and stable pancreatic mass.    His lipase was elevated at 900.  He was also found to have DVT in the left internal jugular, subclavian and axillary vein on CT scan.    He has chronic pain and has been on oxycodone 20 mg.  He forgot his medication at home and his physician from [place] called in for oxycodone 5 mg but patient pain was not controlled with it.      1. Acute on chronic abdominal pain.    This is most likely worsening of his chronic abdominal pain due to his esophageal cancer with intra-abdominal metastasis and carcinomatosis.  We will add p.o. Dilaudid and consult pain management.    Pancreatic mass is stable which is likely causing elevation of lipase but could have had mild pancreatitis.  Will start p.o. diet.    He has outpatient palliative chemotherapy scheduled in [place] in 1 week.    2. Left internal jugular, subclavian and axillary vein DVT.    Likely due to hypercoagulable state with metastatic cancer, started on heparin drip.  The port is on the right side and is not contributing to the DVT.    Transition to Eliquis on discharge.       Diet: Advance Diet as Tolerated: Clear Liquid Diet    DVT Prophylaxis: Heparin drip  Lawler Catheter: Not present  Lines: PRESENT      Port A Cath Single 07/21/22 Right Chest wall-Site Assessment: WDL      Cardiac Monitoring: None  Code Status: Full Code      Clinically Significant  Risk Factors Present on Admission              # Hypoalbuminemia: Lowest albumin = 2.8 g/dL at 5/4/2023  6:02 AM, will monitor as appropriate                  Disposition Plan      Expected Discharge Date: 05/06/2023                  Zana Mckenzie MD  Hospitalist Service  [facility]  Securely message with [employer] (more info)  Text page via [facility] Paging/Directory   ______________________________________________________________________    Interval History   Pain is better, denies nausea.    Physical Exam   Vital Signs: Temp: 98.7  F (37.1  C) Temp src: Oral BP: 113/72 Pulse: 82   Resp: 18 SpO2: 94 % O2 Device: None (Room air)    Weight: 143 lbs 4.78 oz    Alert, awake and oriented x3.  Chest is clear to auscultation   Mild diffuse abdominal tenderness.  Heart: S1-S2 normal  Extremities no edema.    Medical Decision Making       MANAGEMENT DISCUSSED with the following over the past 24 hours: Patient, RN and patient's sister at bedside       Data   Imaging results reviewed over the past 24 hrs:   Recent Results (from the past 24 hour(s))   US Upper Extremity Venous Duplex Left    Narrative    US UPPER EXTREMITY VENOUS DUPLEX LEFT 5/3/2023 3:55 PM    CLINICAL HISTORY: concern for L internal jugular clot on CT, further  eval  TECHNIQUE: Venous Duplex ultrasound of the left upper extremity with  (when possible) and without compression, augmentation, and duplex.  Color flow and spectral Doppler with waveform analysis performed.    COMPARISON: Same day neck CT    FINDINGS: Ultrasound includes evaluation of the internal jugular vein,  innominate vein, subclavian vein, axillary vein, and brachial vein.  The superficial cephalic and basilic veins were also evaluated where  seen.     LEFT: There is partially occlusive deep vein thrombus in the left  internal jugular vein which corresponds to findings on same day neck  CT. There is a likely medial collateral vein which appears patent.  Thrombus extends into  the medial and mid subclavian vein where it is  completely occlusive. Lateral portion of the subclavian vein is  patent. There is also nonocclusive thrombus in the left axillary vein.  Nonocclusive thrombus in the left basilic vein in the upper arm.  Cephalic vein is patent.      Impression    IMPRESSION:   1.  Deep vein thrombosis involving the left internal jugular,  subclavian and axial veins as above. Surrounding venous collaterals  noted.  2.  Nonocclusive superficial thrombus involving the basilic vein.    OMARI WAKEFIELD MD         SYSTEM ID:  [id number]     Recent Labs   Lab 05/04/23  0602 05/03/23  1207   WBC 14.5* 16.5*   HGB 10.9* 12.2*   MCV 96 96    375   * 137   POTASSIUM 4.0 3.9   CHLORIDE 99 104   CO2 22 23   BUN 6.0* 8.5   CR 0.66* 0.66*   ANIONGAP 12 10   JILL 8.6* 8.2*   * 107*   ALBUMIN 2.8* 2.9*   PROTTOTAL 6.0* 6.4   BILITOTAL 0.3 0.5   ALKPHOS 148* 151*   ALT 20 20   AST 23 18   LIPASE 763* 862*

## 2023-05-04 NOTE — PLAN OF CARE
End of Shift Summary  For vital signs and complete assessments, please see documentation flowsheets.     Pertinent assessments: Pt admitted from ED with abdominal pain and DVT. C/o pain of 6/10, IV dilaudid given. Heparin infusing @ 1150, recheck due at 0000. Up independently in room. NPO with ice.    Major Shift Events: admitted from ED    Treatment Plan: Bowel rest, IV fluids, heparin, advance diet in am.

## 2023-05-05 NOTE — PROGRESS NOTES
Pt and wife state understanding of discharge meds -- follow up and instructions ---- pt given pain meds and will  others

## 2023-05-05 NOTE — DISCHARGE SUMMARY
[facility]  Hospitalist Discharge Summary      Date of Admission:  5/3/2023  Date of Discharge:  5/5/2023  Discharging Provider: Zana Mckenzie MD  Discharge Service: Hospitalist Service    Discharge Diagnoses       Acute on chronic abdominal pain due to metastatic esophageal cancer    Left internal jugular, subclavian and axillary vein DVT    Possible pancreatitis versus elevated lipase due to known pancreatic mass    Moderate Protein-Calorie Malnutrition        Follow-ups Needed After Discharge   Follow-up Appointments     Follow-up and recommended labs and tests       Follow up with primary care provider, Physician No Ref-Primary, within 7   days for hospital follow- up.  The following labs/tests are recommended:   CBC and BMP.             Unresulted Labs Ordered in the Past 30 Days of this Admission     Date and Time Order Name Status Description    5/3/2023  5:35 PM Blood Culture Arm, Left Preliminary     5/3/2023  5:25 PM Blood Culture Peripheral Blood Preliminary       These results will be followed up by Zana Mckenzie      Discharge Disposition   Discharged to home  Condition at discharge: Stable    Hospital Course   58-year-old male who lives in [place] and is visiting his sister in [place].  He has history of metastatic esophageal cancer and is on palliative chemotherapy.  He presented to ER with acute on chronic abdominal pain.  He was hemodynamically stable and CT abdomen showed stable distal esophageal thickening with nodular opacities in the lung suspicious for lymphangitic carcinomatosis or pulmonary mets, adrenal nodules, hepatic metastasis and stable pancreatic mass.    His lipase was elevated at 900.  He was also found to have DVT in the left internal jugular, subclavian and axillary vein on CT scan.    He has chronic pain and has been on oxycodone 20 mg.  He forgot his medication at home and his physician from [place] called in for oxycodone 5 mg but patient pain was not  controlled with it.      1. Acute on chronic abdominal pain.    This is most likely worsening of his chronic abdominal pain due to his esophageal cancer with intra-abdominal metastasis and carcinomatosis.  Pain management was consulted, pain is now controlled with p.o. Dilaudid, gabapentin and hydroxyzine.  We will give him 1 week supply of Dilaudid as he is going back to [place] over the weekend and can continue to get further pain management from his oncologist.    Pancreatic mass is stable which is likely causing elevation of lipase but could have had mild pancreatitis.  Will start p.o. diet.    He has outpatient palliative chemotherapy scheduled in [place] in 1 week.    2. Left internal jugular, subclavian and axillary vein DVT.    Likely due to hypercoagulable state with metastatic cancer, started on heparin drip.  The port is on the right side and is not contributing to the DVT.    Transitioned to Eliquis on discharge.      Consultations This Hospital Stay   PHARMACY IP CONSULT  HEMATOLOGY & ONCOLOGY IP CONSULT  PAIN MANAGEMENT ADULT IP CONSULT  PHARMACY LIAISON FOR MEDICATION COVERAGE CONSULT  PHARMACY DISCHARGE EDUCATION BY PHARMACIST    Code Status   Full Code    Time Spent on this Encounter   I, Zana Mckenzie MD, personally saw the patient today and spent greater than 30 minutes discharging this patient.       Zana Mckenzie MD  [place] [address] [facility]  [address] [place] [address]  Phone: [phone]  Fax: [phone]  ______________________________________________________________________    Physical Exam   Vital Signs: Temp: 98.5  F (36.9  C) Temp src: Oral BP: 123/83 Pulse: 94   Resp: 20 SpO2: 96 % O2 Device: None (Room air)    Weight: 140 lbs 4.8 oz  Alert, awake and oriented x3.  Chest is clear to auscultation   Mild diffuse abdominal tenderness has now resolved.  Heart: S1-S2 normal  Extremities no edema.          Primary Care Physician   Physician No  Ref-Primary    Discharge Orders      Reason for your hospital stay    Abdominal pain due to metastatic cancer     Follow-up and recommended labs and tests     Follow up with primary care provider, Physician No Ref-Primary, within 7 days for hospital follow- up.  The following labs/tests are recommended: CBC and BMP.     Activity    Your activity upon discharge: activity as tolerated     Diet    Follow this diet upon discharge: Orders Placed This Encounter      Snacks/Supplements Adult: Ensure Enlive; With Meals      Snacks/Supplements Adult: Gelatein Plus; With Meals      Regular Diet Adult       Significant Results and Procedures   Most Recent 3 CBC's:Recent Labs   Lab Test 05/04/23  0602 05/03/23  1207 11/01/22  1230   WBC 14.5* 16.5* 4.4   HGB 10.9* 12.2* 11.3*   MCV 96 96 100    375 261     Most Recent 3 BMP's:Recent Labs   Lab Test 05/04/23  0602 05/03/23  1207 11/01/22  1230   * 137 138   POTASSIUM 4.0 3.9 3.9   CHLORIDE 99 104 106   CO2 22 23 29   BUN 6.0* 8.5 13   CR 0.66* 0.66* 0.71   ANIONGAP 12 10 3   JILL 8.6* 8.2* 8.6   * 107* 139*     Most Recent 2 LFT's:Recent Labs   Lab Test 05/04/23  0602 05/03/23  1207   AST 23 18   ALT 20 20   ALKPHOS 148* 151*   BILITOTAL 0.3 0.5   ,   Results for orders placed or performed during the hospital encounter of 05/03/23   Soft tissue neck CT w contrast    Narrative    CT SCAN OF THE NECK WITH CONTRAST  5/3/2023 1:18 PM     HISTORY: Posterior left cervical paraspinal muscle tenderness.    TECHNIQUE: Axial CT images of the neck following administration of  intravenous contrast with reformations. Radiation dose for this scan  was reduced using automated exposure control, adjustment of the mA  and/or kV according to patient size, or iterative reconstruction  technique. 125mL Isovue-370 per 2 exams IV.     COMPARISON: PET CT 8/8/2022.      FINDINGS: Slight nonspecific asymmetric soft tissue fullness within  the left oropharynx, presumably incidental  (direct visualization could  be considered). The oral cavity, oropharynx, hypopharynx, and larynx  are otherwise unremarkable.    Abnormally enlarged and heterogeneously hyperenhancing left-sided  periparotid, level IIa, level IIb, level III, and level V lymph nodes  are present. Abnormally enlarged and hyperenhancing right-sided level  IIb, level IV, and level V lymph nodes. Lymphadenopathy appears to  have evolved compared to the prior PET/CT although comparison is  limited due to differences in technique.    Nonspecific enlarged subcutaneous lymph nodes involving the left upper  back/lower neck musculature with associated hazy inflammatory change  which may extend into the fascial layers along the posterior  paraspinal musculature (for example series 3 image 57), probably new  compared to the prior PET/CT.    Filling defect within the left internal jugular vein (series 3 image  78), with asymmetrically decreased opacification of the more inferior  left internal jugular vein, left brachiocephalic vein, and left  subclavian vein.    Multiple hazy and irregular appearance superior mediastinal lymph  nodes are present. Right pleural effusion. Scattered nonspecific  pulmonary nodules/opacities (refer to chest report). Cervical spine  degenerative changes (refer to cervical spine report).      Impression    IMPRESSION:    1. Multiple abnormal cervical lymph nodes concerning for metastatic  disease. Associated perirenal inflammatory stranding is present -  superimposed infectious/reactive adenitis cannot be excluded.  2. Filling defect within the left internal jugular vein concerning for  deep vein thrombosis with poor opacification of the inferior internal  jugular vein, brachiocephalic vein, and left subclavian vein.  Ultrasound correlation could be performed.      ANA PAULA KU MD         SYSTEM ID:  [id number]   Cervical spine CT w/o contrast    Narrative    CT CERVICAL SPINE WITHOUT CONTRAST   5/3/2023 1:19 PM      HISTORY: Focal C-spine tenderness, history of esophageal cancer.     TECHNIQUE: Axial images of the cervical spine were obtained without  intravenous contrast. Multiplanar reformations were performed.   Radiation dose for this scan was reduced using automated exposure  control, adjustment of the mA and/or kV according to patient size, or  iterative reconstruction technique.    COMPARISON: None.    FINDINGS: No fracture is identified. Multilevel mild degenerative disc  disease and mild facet arthropathy. Moderate facet arthropathy on the  left at C7-T1. No high-grade spinal canal or foraminal stenosis.  Cervical lymphadenopathy is present. Refer to soft tissue neck CT  report.      Impression    IMPRESSION:  1. Mild degenerative change.  2. No destructive or aggressive osseous lesions.  3. No high-grade stenoses.  4. Cervical lymphadenopathy. Refer to soft tissue neck report.    ANA PAULA KU MD         SYSTEM ID:  [id number]   CT Chest/Abdomen/Pelvis w Contrast    Narrative    CT CHEST/ABDOMEN/PELVIS W CONTRAST 5/3/2023 1:17 PM    CLINICAL HISTORY: hx of esophageal cancer, periumbilical abd pain and  tenderness    TECHNIQUE: CT scan of the chest, abdomen, and pelvis was performed  following injection of IV contrast. Multiplanar reformats were  obtained. Dose reduction techniques were used.   CONTRAST: 125mL Isovue-370 per 2 exams    COMPARISON: 10/19/2022    FINDINGS:   LUNGS AND PLEURA: Multifocal new areas of linear and nodular  intralobular septal thickening, predominantly in the right upper lobe,  and bilateral lower lobes, indeterminate but suspicious for  lymphangitic carcinomatosis. There may also be superimposed mild  pulmonary edema and infection. A few discrete new pulmonary nodules.  For example, a 16 mm nodule in the right lower lobe (series 5, image  121) is new. New small right pleural effusion and mild right basilar  atelectasis. A few previously seen pulmonary nodules are stable. For  example, a  subpleural 7 mm left lower lobe nodule is unchanged (image  181). Increase linear soft tissue thickening around the right mainstem  bronchus and right upper, middle and lower lobar bronchi.    MEDIASTINUM/AXILLAE: Stable appearance of circumferential wall  thickening of the distal esophagus. Increased size of left axillary  lymph nodes. For example, 2 adjacent left axillary lymph nodes  measuring 9-12 mm (series 4, image 37) previously measured 4-5 mm.  Multiple small mediastinal lymph nodes have slightly increased in  size. For example, a 10 mm right precarinal lymph node previously  measured 5 mm. Lymphadenopathy/soft tissue thickening in the left  lateral neck measuring 12 x 16 mm (series 4, image 21) previously  measured 9 x 10 mm. Right IJ port catheter. No filling defects in the  central pulmonary arteries. No thoracic aortic aneurysm. Moderate  coronary artery calcifications.    HEPATOBILIARY: Metallic biliary stent in place with decreased, mild  pneumobilia. Linear soft tissue thickening in the hepatic hilum and  linear enhancement of the common hepatic duct has increased. Findings  are suspicious for cholangitis and some strength obstruction. Few  scattered hypodense lesions in the liver are stable. Increased  distention of the gallbladder.    PANCREAS: A 1.9 cm mass in or abutting the pancreatic head (series 4,  image 146) is stable, previously measuring about 1.7 cm (series 4,  image 146). Stable mild prominence of the main pancreatic duct  throughout the pancreatic body measuring 5 mm. Mild peripancreatic  nonspecific fat stranding is stable.    SPLEEN: Normal.    ADRENAL GLANDS: New 2 cm nodule in the right adrenal gland and  increased thickening of the left adrenal gland without a discrete left  adrenal nodule.    KIDNEYS/BLADDER: Normal.    BOWEL: No small bowel or colonic obstruction or inflammatory changes.  Normal appendix. Previously seen colitis has resolved. Gastrostomy  tube has been  removed.    PELVIC ORGANS: No pelvic masses.    ADDITIONAL FINDINGS: New retroperitoneal lymphadenopathy. For example,  there is a 13 mm left para-aortic lymph node (series 4, image 171),  previously measuring 6 mm and a few new pelvic lymph nodes including a  12 mm right external iliac lymph node (series 4, image 269),  previously measuring 6 mm.    No abdominal aortic aneurysm. Stable small pelvic ascites. No abscess  or free air.    MUSCULOSKELETAL: No destructive lesions in the bones.      Impression    IMPRESSION: Since 10/19/2022, overall disease progression  1.  Stable distal esophageal wall thickening.   2.  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 Possible  superimposed pulmonary edema and/or atypical pneumonia. New small  right pleural effusion.  3.  New/progressive lymphadenopathy in the left axilla, visualized  lower neck, retroperitoneum and pelvis. Increased linear soft tissue  thickening around right sided bronchi may be due to worsening  lymphangitic carcinomatosis or lymphadenopathy.  4.  New 2 cm right adrenal nodule, likely metastatic.  5.  Increased soft tissue thickening in the hepatic hilum and around  intrahepatic bile ducts which may be due to cholangitis or metastases.  Pneumobilia has decreased suggesting possible stent malfunction.  Increased gallbladder distention.  6.  Stable 1.9 cm nodule/mass abutting the pancreatic head, likely a  peripancreatic metastatic lymph node.  7.  Mild nonspecific fat stranding around the pancreas is stable.  8.  Previously seen colitis has resolved.    SAEED MORRISON MD         SYSTEM ID:  [id number]   US Upper Extremity Venous Duplex Left    Narrative    US UPPER EXTREMITY VENOUS DUPLEX LEFT 5/3/2023 3:55 PM    CLINICAL HISTORY: concern for L internal jugular clot on CT, further  eval  TECHNIQUE: Venous Duplex  ultrasound of the left upper extremity with  (when possible) and without compression, augmentation, and duplex.  Color flow and spectral Doppler with waveform analysis performed.    COMPARISON: Same day neck CT    FINDINGS: Ultrasound includes evaluation of the internal jugular vein,  innominate vein, subclavian vein, axillary vein, and brachial vein.  The superficial cephalic and basilic veins were also evaluated where  seen.     LEFT: There is partially occlusive deep vein thrombus in the left  internal jugular vein which corresponds to findings on same day neck  CT. There is a likely medial collateral vein which appears patent.  Thrombus extends into the medial and mid subclavian vein where it is  completely occlusive. Lateral portion of the subclavian vein is  patent. There is also nonocclusive thrombus in the left axillary vein.  Nonocclusive thrombus in the left basilic vein in the upper arm.  Cephalic vein is patent.      Impression    IMPRESSION:   1.  Deep vein thrombosis involving the left internal jugular,  subclavian and axial veins as above. Surrounding venous collaterals  noted.  2.  Nonocclusive superficial thrombus involving the basilic vein.    OMARI WAKEFIELD MD         SYSTEM ID:  [id number]       Discharge Medications   Current Discharge Medication List      START taking these medications    Details   Apixaban Starter Pack (ELIQUIS DVT/PE STARTER PACK) 5 MG TBPK Take 10 mg by mouth 2 times daily for 7 days, THEN 5 mg 2 times daily for 23 days.  Qty: 74 each, Refills: 0    Associated Diagnoses: Acute deep vein thrombosis (DVT) of axillary vein of left upper extremity (H)      gabapentin (NEURONTIN) 100 MG capsule Take 2 capsules (200 mg) by mouth 3 times daily  Qty: 180 capsule, Refills: 0    Associated Diagnoses: Metastatic adenocarcinoma to esophagus (H)      HYDROmorphone (DILAUDID) 4 MG tablet Take 1 tablet (4 mg) by mouth every 3 hours as needed for moderate pain  Qty: 30 tablet, Refills: 0     Associated Diagnoses: Metastatic adenocarcinoma to esophagus (H)      hydrOXYzine (ATARAX) 25 MG tablet Take 1-2 tablets (25-50 mg) by mouth 3 times daily as needed for other (Adjuvant for pain)  Qty: 60 tablet, Refills: 0    Associated Diagnoses: Metastatic adenocarcinoma to esophagus (H)      ondansetron (ZOFRAN ODT) 4 MG ODT tab Take 1 tablet (4 mg) by mouth every 6 hours as needed for nausea or vomiting  Qty: 30 tablet, Refills: 0    Associated Diagnoses: Metastatic adenocarcinoma to esophagus (H)      polyethylene glycol (MIRALAX) 17 GM/Dose powder Take 17 g by mouth daily  Qty: 510 g, Refills: 0    Associated Diagnoses: Metastatic adenocarcinoma to esophagus (H)      senna-docusate (SENOKOT-S/PERICOLACE) 8.6-50 MG tablet Take 1 tablet by mouth 2 times daily  Qty: 100 tablet, Refills: 0    Associated Diagnoses: Metastatic adenocarcinoma to esophagus (H)         CONTINUE these medications which have NOT CHANGED    Details   acetaminophen (TYLENOL) 325 MG tablet Take 650 mg by mouth as needed      ondansetron (ZOFRAN) 8 MG tablet Take 1 tablet (8 mg) by mouth every 8 hours as needed for nausea  Qty: 30 tablet, Refills: 1    Associated Diagnoses: Esophageal adenocarcinoma (H)      dexamethasone (DECADRON) 4 MG tablet Take 4 mg by mouth 2 times daily (with meals) 2-4 days after chemo      prochlorperazine (COMPAZINE) 10 MG tablet Take 10 mg by mouth every 6 hours as needed for nausea or vomiting         STOP taking these medications       oxyCODONE (ROXICODONE) 5 MG tablet Comments:   Reason for Stopping:             Allergies   No Known Allergies

## 2023-05-05 NOTE — PROGRESS NOTES
Chart check:  58 year old male with metastatic esophageal adenocarcinoma admitted with abdominal pain, found to have DVT and progressive cancer.     1. Metastatic esophageal adenocarcinoma  2. 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  He is due for his next chemo cycle in about a week.  --He will return to [place] as soon as possible resume his chemo with Dr. Laughlin.     3. Left internal jugular, subclavian and axial DVT  --Related to hypercoagulability of malignancy.  --Transition from heparin drip to Eliquis.  --He will need long-term anticoagulation.     4. Abdominal pain  --Related to #1  --Continue Dilaudid as needed -- can transition to oral equivalent at discharge.    No new recommendations.  Will signoff but please call back if any questions.    Mireya Garnett MD  Hematology/Oncology  [facility]

## 2023-05-05 NOTE — PLAN OF CARE
Goal Outcome Evaluation:    Patient is Aox4, VSS, tolerating full liquid diet and oral medications, independent in the room, port in the RT chest is running a Heparin gtt drip at 1300u/hr, IV in the LT arm is running 5% & 0.45 NaCl with KCl 20, abdominal pain controlled with lidocaine patch, scheduled Tylenol, Gabapentin and Atarax, resting well, able to make needs known, will continue to monitor and provide cares.

## 2023-05-05 NOTE — PROGRESS NOTES
"CLINICAL NUTRITION SERVICES - ASSESSMENT NOTE     Nutrition Prescription    RECOMMENDATIONS FOR MDs/PROVIDERS TO ORDER:  None    Malnutrition Status:    Moderate in the context of chronic illness    Recommendations already ordered by Registered Dietitian (RD):  Ensure Enlive BID w/ meals  Gelatein Plus daily w/ meals  100mg thiamin as pt is at risk for refeeding syndrome  Multivitamin as pt is meeting < 75% nutrition needs  Phos and Mg labs as pt is at risk for refeeding syndrome    Future/Additional Recommendations:  Adjust supplements pending PO intake/patient preference       REASON FOR ASSESSMENT  Noah Morrison is a/an 58 year old male assessed by the dietitian for Admission Nutrition Risk Screen for positive    Patient presents with metastatic adenocarcinoma of esophagus (lungs, lymph nodes, adrenal, liver)    NUTRITION HISTORY  Pt has had no food for past couple of days, has been eating < 50% usual intake for about a week, and < 75% of usual intake for the past 2 weeks. Stated UBW 165lbs. NKFA. Pt drinks Ensure and Boost at home and also eats protein bars. Pt has a history of TF last autumn which was discontinued after improved intake.    CURRENT NUTRITION ORDERS  Diet: Full Liquid  Intake/Tolerance: Pt just advanced to full liquids today.     LABS  Labs reviewed: Na 133, UN 6.0, Cr 0.66, alb 2.8, tot pro 6.0, alk phos 148, lipase 763, hgb 10.9, hematocrit 34.6, rbc 3.61    MEDICATIONS  Medications reviewed: zosyn, miralax, D5/NaCl,KCl infusion     ANTHROPOMETRICS  Height: 172.7 cm (5' 8\")  Most Recent Weight: 65 kg (143 lb 4.8 oz)    IBW: 68.4 kg  BMI: Normal BMI  Weight History:   Wt Readings from Last 30 Encounters:   05/03/23 65 kg (143 lb 4.8 oz)   11/01/22 72.8 kg (160 lb 6.4 oz)      10.7% wt loss in 6 months   10/26/22 70.4 kg (155 lb 3.2 oz)   10/19/22 70.8 kg (156 lb 1.4 oz)   10/13/22 68.9 kg (152 lb)   10/12/22 66.7 kg (147 lb)   10/04/22 66.5 kg (146 lb 8 oz)       Dosing Weight: 65 " kg    ASSESSED NUTRITION NEEDS  Estimated Energy Needs: 1729 kcals/day ([place] Jeor)  Justification: Increased needs and Repletion  Estimated Protein Needs: 78-91 grams protein/day (1.2 - 1.4 grams of pro/kg)  Justification: Increased needs and Repletion  Estimated Fluid Needs: 1950 mL/day (30 mL/kg)   Justification: Maintenance    PHYSICAL FINDINGS  See malnutrition section below.  No abnormal nutrition-related physical findings observed.       MALNUTRITION:  % Weight Loss:  > 10% in 6 months (severe malnutrition)  % Intake:  Decreased intake does not meet criteria for malnutrition as it has not yet been a month of eating < 75% estimated energy needs  Subcutaneous Fat Loss:  Orbital region moderate depletion, Upper arm region moderate depletion and Thoracic region moderate depletion  Muscle Loss:  Temporal region moderate depletion, Clavicle bone region moderate depletion, Acromion bone region mild to moderate depletion, Dorsal hand region mild depletion, Patellar region mild depletion and Posterior calf region mild depletion  Fluid Retention:  None noted    Malnutrition Diagnosis: Moderate malnutrition  In Context of:  Chronic illness or disease    NUTRITION DIAGNOSIS  Malnutrition related to metastatic esophageal cancer as evidenced by > 10% unintentional weight loss in 6 months, moderate subcutaneous fat loss, mild muscle loss      INTERVENTIONS  Implementation  Nutrition Education: Per provider order if indicated   Medical food supplement therapy     Goals  Patient to consume % of nutritionally adequate meals three times per day, or the equivalent with supplements/snacks.  Total avg nutritional intake to meet a minimum of 1729 kcal/kg and 78 g PRO/kg daily (per dosing wt 65 kg).     Monitoring/Evaluation  Progress toward goals will be monitored and evaluated per protocol. PO intake, supplement tolerance, wt, labs

## 2023-05-05 NOTE — PLAN OF CARE
Problem: Oral Intake Inadequate  Goal: Improved Oral Intake  Outcome: Progressing     Goal Outcome Evaluation:      Plan of Care Reviewed With: patient    Overall Patient Progress: improvingOverall Patient Progress: improving    Outcome Evaluation: Pt advanced to full liquid. Ordered Ensure Enlive BID w/ meals,  Gelatein Plus daily w/ meals , 100mg thiamin as pt is at risk for refeeding syndrome,  Multivitamin as pt is meeting < 75% nutrition needs,  Phos and Mg labs as pt is at risk for refeeding syndrome

## 2023-05-07 NOTE — PROGRESS NOTES
Clinic Care Coordination Contact  [facility]: Post-Discharge Note  SITUATION                                                      Admission:    Admission Date: 05/03/23   Reason for Admission: Acute on chronic abdominal pain due to metastatic esophageal cancer, Left internal jugular, subclavian and axillary vein DVT, Possible pancreatitis versus elevated lipase due to known pancreatic mass  Discharge:   Discharge Date: 05/05/23  Discharge Diagnosis: Acute on chronic abdominal pain due to metastatic esophageal cancer, Left internal jugular, subclavian and axillary vein DVT, Possible pancreatitis versus elevated lipase due to known pancreatic mass    BACKGROUND                                                      Per hospital discharge summary and inpatient provider notes:    58-year-old male who lives in [place] and is visiting his sister in [place].  He has history of metastatic esophageal cancer and is on palliative chemotherapy.  He presented to ER with acute on chronic abdominal pain.  He was hemodynamically stable and CT abdomen showed stable distal esophageal thickening with nodular opacities in the lung suspicious for lymphangitic carcinomatosis or pulmonary mets, adrenal nodules, hepatic metastasis and stable pancreatic mass.    ASSESSMENT           Discharge Assessment  How are you doing now that you are home?: doing well  How are your symptoms? (Red Flag symptoms escalate to triage hotline per guidelines): Improved  Do you feel your condition is stable enough to be safe at home until your provider visit?: Yes  Does the patient have their discharge instructions? : Yes  Does the patient have questions regarding their discharge instructions? : No  Were you started on any new medications or were there changes to any of your previous medications? : Yes  Does the patient have all of their medications?: Yes  Do you have questions regarding any of your medications? : No  Do you have all of your needed  medical supplies or equipment (DME)?  (i.e. oxygen tank, CPAP, cane, etc.): Yes    Post-op ([facility] CTA Only)  If the patient had a surgery or procedure, do they have any questions for a nurse?: No         PLAN                                                      Outpatient Plan:      No future appointments.    Follow-up Appointments     Follow-up and recommended labs and tests       Follow up with primary care provider, Physician No Ref-Primary, within 7   days for hospital follow- up.  The following labs/tests are recommended:   CBC and BMP.       For any urgent concerns, please contact our 24 hour nurse triage line: [phone])         MICHAELA Barlow  [phone]  [facility]

## 2023-05-11 NOTE — PROGRESS NOTES
Writer followed up with Edilberto's sister Radha to see how he's doing. She reports that he was discharged from the hospital and was able to make it back to [place]. Radha states that he started a new pain management plan while he was admitted and think it's really helping him. Edilberto doesn't have any additional needs at this time, but will return call to clinic when he plans to come back to [place].     Louise Willis RN on 5/11/2023 at 1:24 PM

## 2023-06-19 PROBLEM — C78.89: Status: ACTIVE | Noted: 2023-01-01

## 2023-06-19 PROBLEM — K31.1 GASTRIC OUTLET OBSTRUCTION: Status: ACTIVE | Noted: 2023-01-01

## 2023-06-19 PROBLEM — E86.0 DEHYDRATION: Status: ACTIVE | Noted: 2023-01-01

## 2023-06-19 PROBLEM — I82.622 ACUTE DEEP VEIN THROMBOSIS (DVT) OF LEFT UPPER EXTREMITY, UNSPECIFIED VEIN (H): Status: ACTIVE | Noted: 2023-01-01

## 2023-06-19 NOTE — TELEPHONE ENCOUNTER
Signed Prescriptions:                        Disp   Refills    rivaroxaban ANTICOAGULANT (XARELTO) 20 MG *30 tab*3        Sig: Take 1 tablet (20 mg) by mouth daily (with dinner)  Authorizing Provider: MELVA DIAZ RN on 6/19/2023 at 10:48 AM

## 2023-06-19 NOTE — PROGRESS NOTES
"Pt's sister, Radha, called in to triage line today to report a \"walnut sized lump\" on the left side of patient's neck, left hand swelling, nausea and coughing that patient developed this morning.     Background:  Edilberto is a previous patient of Dr. Ji. He was seen for esophageal cancer and was treated with FOLFOX + Nivolumab. He transferred care from our clinic to an oncology clinic in [place] and is now transferring care back.  He has a follow up with Dr. Arce scheduled on 6/23/23.      Per Radha, patient was recently treated for a left internal jugular, subclavian and axillary vein DVT on 5/5/23.  He was on Xarelto, but stopped it 4 days ago.  Radha said that patient had a long car ride for 18+hours from [place] to [place] after he stopped the Xarelto 4 days ago.  They noticed the \"walnut-sized\" lump on the left side of patient's neck this morning, left hand swelling, nausea and increased coughing.  Denies shortness of breath or chest pain.      Due to patient's symptoms, history of DVT, stopping Xarelto recently, and the long car ride that patient had recently, advised Radha to take patient straight to ED and not delay.  Explained the importance that he needs emergency care and to be evaluated urgently.  Radha voiced understanding and will take patient to [facility] ED now.  Will route message to Dr. Arce and Louise, Dr. Arce's RNCC, to update and follow.    Anat Hines, RN, BSN    Triage Nurse Advisor  [facility] [place]/Mimi/Yamilka Diop  [phone]      "

## 2023-06-19 NOTE — H&P
"[facility]    History and Physical  Hospitalist     Date of Admission:  6/19/2023  Date of Service (when I saw the patient): 06/19/23  Provider: Aezem Diop MD      Chief Complaint    Difficulty swallowing    History is obtained from the patient, electronic health record and emergency department physician    History of Present Illness   Noah Morrison is a 58 year old male who is known for metastatic esophageal cancer.  This patient has been following with [facility] oncology.  He had moved to [place].  He was diagnosed with DVT and started on Xarelto.  Now het is back in [place] trying to retake his care here.He had diagnosis of DVT in the left IJV, subclavian and axillary vein. but he was not taking the Xarelto lately, this seems to be a combination of difficulty swallowing and running out of his supply.  He presented to the emergency department with concerns about a lump in his neck.       I have discussed this case with Dr. Michell Angeles who is requesting admission.  Vital signs:  Temp: 98.5  F (36.9  C) Temp src: Oral BP: 96/65 Pulse: 102   Resp: 20 SpO2: 92 % O2 Device: Nasal cannula Oxygen Delivery: 1 LPM Height: 172.7 cm (5' 8\") Weight: 58.1 kg (128 lb)  Estimated body mass index is 19.46 kg/m  as calculated from the following:    Height as of this encounter: 1.727 m (5' 8\").    Weight as of this encounter: 58.1 kg (128 lb).      Laboratory work-up is significant for CBC, leukocytosis 24.7, hemoglobin 10.1, platelets are normal.  Differential is notable for absolute neutrophil count 22.2 and lymphocyte count 0.7.  Chemistry shows  BUN 11.5, calcium 8.0, glucose 124, chloride 97 and sodium 132.  CTA chest with contrast.  1.  No acute aortic or arterial findings.     2.  SVC is patent. Right internal jugular and subclavian veins are  patent. Left innominate and subclavian veins are not well evaluated on  arterial phase imaging.     3.  Fluid distention " with increased mucosal enhancement involving the  esophagus with irregular ulcerated circumferential mass at the distal  esophagus/GE junction. The stomach is also markedly distended with  fluid. Duodenum is relatively decompressed although not fully  evaluated. Query possible stomach outlet obstructive process. Patient  may benefit from NG decompression.     4.  Progressive metastatic disease with increased findings of  lymphangitic metastatic disease involving the right greater than left  lung and likely accounting for the increased soft tissue thickening  involving the right greater than left mario.     5.  New trace perihepatic ascites. The gallbladder is distended  without wall thickening to suggest acute cholecystitis. Increased  periportal edema, nonspecific.     6.  Additional findings are similar to the prior exam.    CT of the chest abdomen and pelvis.  1.  Minor carotid artery atherosclerosis. No dissection or  hemodynamically significant narrowing in the neck by NASCET criteria.  2.  The arterial timing of the contrast bolus does not allow for  assessment of the previously demonstrated left internal jugular  thrombus.  3.  Mildly progressed cervical lymphadenopathy.  4.  Esophageal wall appears more thickened and enhancing than on  prior, potentially with some surrounding inflammatory change.  5.  Bilateral pleural effusions      Left upper extremity venous Doppler.  Improving DVT in the left upper extremity.        Past Medical History    Metastatic esophageal cancer      Assessment & Plan   Noah Morrison is a 58 year old male who presents with different symptoms related to esophageal cancer with metastatic disease that seems to be advancing into a terminal phase.  He is having difficulties to swallow including his medications.  Imaging question the possibility of gastric outlet obstruction.  Patient seems to be a failure to thrive.  He was accompanied to the hospital by his sister who commented to the  emergency department physician that he is starting to make his mind regarding the goal of care.    Metastatic esophageal disease.  Internal jugular vein/subclavian vein and axillary vein DVT.  Failure to thrive.  Possible gastric outlet obstruction.    Admit to observation.  NPO.  Place NG if the patient has nausea-vomiting or abdominal distention.  IV hydration until a.m.  Zofran and Compazine IV as needed.  Pantoprazole 40 mg IV daily.  Hydration with D5/normal saline/KCl  Dilaudid as needed for pain.  Lovenox subcutaneous 1 mg/kg twice daily  [facility] oncology consultation.  .  Palliative team to set goals of care      Clinically Significant Risk Factors Present on Admission               # Drug Induced Coagulation Defect: home medication list includes an anticoagulant medication                   Code Status   Full Code    Primary Care Physician   Physician No Ref-Primary      Past Surgical History   I have reviewed this patient's surgical history and updated it with pertinent information if needed.  No past surgical history on file.    Prior to Admission Medications   Prior to Admission Medications   Prescriptions Last Dose Informant Patient Reported? Taking?   HYDROmorphone (DILAUDID) 4 MG tablet   No No   Sig: Take 1 tablet (4 mg) by mouth every 3 hours as needed for moderate pain   acetaminophen (TYLENOL) 325 MG tablet   Yes No   Sig: Take 650 mg by mouth as needed   dexamethasone (DECADRON) 4 MG tablet   Yes No   Sig: Take 4 mg by mouth 2 times daily (with meals) 2-4 days after chemo   gabapentin (NEURONTIN) 100 MG capsule   No No   Sig: Take 2 capsules (200 mg) by mouth 3 times daily   hydrOXYzine (ATARAX) 25 MG tablet   No No   Sig: Take 1-2 tablets (25-50 mg) by mouth 3 times daily as needed for other (Adjuvant for pain)   ondansetron (ZOFRAN ODT) 4 MG ODT tab   No No   Sig: Take 1 tablet (4 mg) by mouth every 6 hours as needed for nausea or vomiting   ondansetron (ZOFRAN) 8 MG tablet   No No    Sig: Take 1 tablet (8 mg) by mouth every 8 hours as needed for nausea   polyethylene glycol (MIRALAX) 17 GM/Dose powder   No No   Sig: Take 17 g by mouth daily   prochlorperazine (COMPAZINE) 10 MG tablet   Yes No   Sig: Take 10 mg by mouth every 6 hours as needed for nausea or vomiting   rivaroxaban ANTICOAGULANT (XARELTO) 20 MG TABS tablet   No No   Sig: Take 1 tablet (20 mg) by mouth daily (with dinner)   senna-docusate (SENOKOT-S/PERICOLACE) 8.6-50 MG tablet   No No   Sig: Take 1 tablet by mouth 2 times daily      Facility-Administered Medications: None     Allergies   No Known Allergies    Social History   I have personally reviewed the social history with the patient showing.  Social History     Tobacco Use     Smoking status: Former     Smokeless tobacco: Never   Vaping Use     Vaping status: Not on file   Substance Use Topics     Alcohol use: Yes        Family History   I have reviewed this patient's family history and it is not contributory to the admission .        Review of Systems   Except as noted in the HPI, a 12-system Review of Systems was found to be negative.       Physical Exam   Vital Signs with Ranges  Temp:  [98.5  F (36.9  C)] 98.5  F (36.9  C)  Pulse:  [] 99  Resp:  [10-19] 13  BP: (83-96)/(62-70) 94/62  SpO2:  [90 %-96 %] 91 %  125 lbs 0 oz    GEN:  Alert, oriented x 3, appears comfortable, NAD.  HEENT:  Normocephalic/atraumatic, no scleral icterus, no nasal discharge, mouth moist.  CV:  Regular rate and rhythm, no murmur or JVD.  S1 + S2 noted, no S3 or S4.  LUNGS:  Clear to auscultation bilaterally without rales/rhonchi/wheezing/retractions.  Symmetric chest rise on inhalation noted.  ABD:  Active bowel sounds, soft, non-tender/non-distended.  No rebound/guarding/rigidity.  EXT:  No edema or cyanosis.  No joint synovitis noted.  SKIN:  Dry to touch, no exanthems noted in the visualized areas.       Data   I personally reviewed   Results for orders placed or performed during the  hospital encounter of 06/19/23 (from the past 24 hour(s))   CBC with platelets differential    Narrative    The following orders were created for panel order CBC with platelets differential.  Procedure                               Abnormality         Status                     ---------                               -----------         ------                     CBC with platelets and d...[[id number]]  Abnormal            Final result                 Please view results for these tests on the individual orders.   Basic metabolic panel   Result Value Ref Range    Sodium 132 (L) 136 - 145 mmol/L    Potassium 3.7 3.4 - 5.3 mmol/L    Chloride 97 (L) 98 - 107 mmol/L    Carbon Dioxide (CO2) 25 22 - 29 mmol/L    Anion Gap 10 7 - 15 mmol/L    Urea Nitrogen 11.5 8.0 - 23.0 mg/dL    Creatinine 0.76 0.67 - 1.17 mg/dL    Calcium 8.0 (L) 8.8 - 10.2 mg/dL    Glucose 124 (H) 70 - 99 mg/dL    GFR Estimate >90 >60 mL/min/1.73m2   Magnesium   Result Value Ref Range    Magnesium 2.2 1.7 - 2.3 mg/dL   CBC with platelets and differential   Result Value Ref Range    WBC Count 24.7 (H) 4.0 - 11.0 10e3/uL    RBC Count 3.44 (L) 4.40 - 5.90 10e6/uL    Hemoglobin 10.1 (L) 13.3 - 17.7 g/dL    Hematocrit 32.5 (L) 40.0 - 53.0 %    MCV 95 78 - 100 fL    MCH 29.4 26.5 - 33.0 pg    MCHC 31.1 (L) 31.5 - 36.5 g/dL    RDW 18.0 (H) 10.0 - 15.0 %    Platelet Count 379 150 - 450 10e3/uL    % Neutrophils 91 %    % Lymphocytes 3 %    % Monocytes 5 %    % Eosinophils 0 %    % Basophils 0 %    % Immature Granulocytes 1 %    NRBCs per 100 WBC 0 <1 /100    Absolute Neutrophils 22.2 (H) 1.6 - 8.3 10e3/uL    Absolute Lymphocytes 0.7 (L) 0.8 - 5.3 10e3/uL    Absolute Monocytes 1.3 0.0 - 1.3 10e3/uL    Absolute Eosinophils 0.0 0.0 - 0.7 10e3/uL    Absolute Basophils 0.1 0.0 - 0.2 10e3/uL    Absolute Immature Granulocytes 0.3 <=0.4 10e3/uL    Absolute NRBCs 0.0 10e3/uL   EKG 12-lead, tracing only   Result Value Ref Range    Systolic Blood Pressure  mmHg     Diastolic Blood Pressure  mmHg    Ventricular Rate 102 BPM    Atrial Rate 102 BPM    NC Interval 132 ms    QRS Duration 84 ms     ms    QTc 547 ms    P Axis 53 degrees    R AXIS 79 degrees    T Axis 42 degrees    Interpretation ECG       Sinus tachycardia  Nonspecific T wave abnormality  Prolonged QT  Abnormal ECG  No previous ECGs available  Confirmed by - EMERGENCY ROOM, PHYSICIAN ([id number]),  DELILAH CISNEROS ([id number]) on 6/19/2023 2:52:29 PM     CTA Chest with Contrast    Narrative    CTA CHEST WITH CONTRAST    PROCEDURE DATE: 6/19/2023 2:58 PM     HISTORY: New left arm swelling and left neck mass, history of  esophageal cancer    COMPARISON: CT from 5/3/2023    TECHNIQUE: Helical acquisition through the chest was performed during  the arterial phase of contrast enhancement. 2D and 3D reconstructions  performed by the CT technologist. Dose reduction techniques were used.  CONTRAST: 80 mL Isovue-370    FINDINGS:   ARTERIAL FINDINGS: Heart is normal in size. Minimal pericardial  thickening without pericardial effusion. Central pulmonary arteries  appear patent.    Thoracic aorta is normal in caliber without dissection or intramural  hematoma. Widely patent three-vessel origin off the aortic arch. No  significant arterial stenosis involving the great vessels. Descending  thoracic aorta and upper abdominal aorta are widely patent and normal  caliber. No acute arterial findings.    A right internal jugular approach port terminates at the cavoatrial  junction. Superior vena cava is opacified without evidence of SVC  thrombosis. The left brachiocephalic vein is partially visualized and  patent near its junction with the SVC. It is not well evaluated  through the superior mediastinum. Left subclavian vein is not well  evaluated on arterial phase imaging.    Azygos vein appears patent with nonopacified blood flowing into the  SVC.    NONVASCULAR:  MEDIASTINUM: New fluid distention with circumferential  mucosal  enhancement and abnormal wall thickening at the distal esophagus  involving the GE junction. This is contiguous with extensive stomach  fluid distention. New generalized soft tissue thickening involving the  hilum. Mild generalized soft tissue thickening involving the left  hilar region. No mediastinal gas to suggest esophageal perforation.  Stable metastatic-appearing lymph nodes involving the left insula.    LUNGS/PLEURA: Slight interval increase in the now mild to moderate  size right pleural effusion. New trace left pleural effusion. No  pneumothorax. Increased diffuse thickening of the intralobular septa  involving the right upper, middle, and lower lobes. Increased  compressive atelectasis at the right lung base. Persistent nodular  opacities involving the right lung, largest at the superior segment  lower lobe. There is also increased interlobular septal thickening  involving the left lower lobe with lesser involvement of the left  upper lobe and lingula.    UPPER ABDOMEN: Extensive fluid distention of the stomach. Common bile  duct stent is partially visualized but appears stable in location.  Increased periportal edema. Gallbladder is moderately distended  without wall thickening. Small volume perihepatic ascites. Diffuse  thickening of the bilateral adrenal glands has slightly increased from  the prior exam.    MUSCULOSKELETAL: No acute or destructive osseous lesions.      Impression    IMPRESSION:  1.  No acute aortic or arterial findings.    2.  SVC is patent. Right internal jugular and subclavian veins are  patent. Left innominate and subclavian veins are not well evaluated on  arterial phase imaging.    3.  Fluid distention with increased mucosal enhancement involving the  esophagus with irregular ulcerated circumferential mass at the distal  esophagus/GE junction. The stomach is also markedly distended with  fluid. Duodenum is relatively decompressed although not fully  evaluated. Query  possible stomach outlet obstructive process. Patient  may benefit from NG decompression.    4.  Progressive metastatic disease with increased findings of  lymphangitic metastatic disease involving the right greater than left  lung and likely accounting for the increased soft tissue thickening  involving the right greater than left mario.    5.  New trace perihepatic ascites. The gallbladder is distended  without wall thickening to suggest acute cholecystitis. Increased  periportal edema, nonspecific.    6.  Additional findings are similar to the prior exam.    Findings discussed with Dr. Angeles at the time of this interpretation.    ABRAM CHILDS MD         SYSTEM ID:  [id number]   CTA Neck with Contrast    Narrative    CTA NECK WITH CONTRAST 6/19/2023 3:00 PM    INDICATION: New left arm swelling and left neck mass, history of  esophageal cancer.  TECHNIQUE: Neck CT angiogram with IV contrast. CT images of the neck  vessels obtained during the arterial phase of intravenous contrast  administration. Axial helical 2D reconstructed images and multiplanar  3D MIP reconstructed images of the neck vessels were performed on a  separate workstation. Dose reduction techniques were used.  CONTRAST: 100mL Isovue-370  COMPARISON: Neck CT 5/3/2023     FINDINGS:  Two vessel (bovine) arch.  Carotid arteries are patent with minor  atherosclerotic change.  No hemodynamically significant stenosis by  NASCET criteria in either carotid system.  Patent vertebral arteries.  No dissection. Right larger than left pleural effusions. Patulous  fluid-filled proximal visualized esophagus. Wall thickening and  suspected adjacent inflammatory stranding within the mediastinum  appears progressed versus the prior neck CT. Persistently abnormal  left level II cervical lymph node may be slightly enlarged. Progressed  right Va lymph nodes.      Impression    IMPRESSION:  1.  Minor carotid artery atherosclerosis. No dissection or  hemodynamically  significant narrowing in the neck by NASCET criteria.  2.  The arterial timing of the contrast bolus does not allow for  assessment of the previously demonstrated left internal jugular  thrombus.  3.  Mildly progressed cervical lymphadenopathy.  4.  Esophageal wall appears more thickened and enhancing than on  prior, potentially with some surrounding inflammatory change.  5.  Bilateral pleural effusions     HIMANSHU CHAU MD         SYSTEM ID:  [id number]   US Upper Extremity Venous Duplex Left    Narrative    US UPPER EXTREMITY VENOUS DUPLEX LEFT  6/19/2023 4:19 PM     HISTORY: left upper extremity swelling    COMPARISON: Ultrasound dated 5/3/2023    FINDINGS: Color Doppler and spectral waveform analysis performed.  There is nonocclusive DVT in the left internal jugular vein and in the  lateral left subclavian vein. Remaining deep veins of the left upper  extremity are patent. Overall, the thrombus has improved when compared  to prior exam.      Impression    IMPRESSION: Improving DVT in the left upper extremity.      FORD LAZCANO MD         SYSTEM ID:  [id number]   CBC with platelets   Result Value Ref Range    WBC Count 26.1 (H) 4.0 - 11.0 10e3/uL    RBC Count 3.06 (L) 4.40 - 5.90 10e6/uL    Hemoglobin 9.1 (L) 13.3 - 17.7 g/dL    Hematocrit 29.2 (L) 40.0 - 53.0 %    MCV 95 78 - 100 fL    MCH 29.7 26.5 - 33.0 pg    MCHC 31.2 (L) 31.5 - 36.5 g/dL    RDW 18.1 (H) 10.0 - 15.0 %    Platelet Count 331 150 - 450 10e3/uL       Securely message with the Vocera Web Console (learn more here)  Text page via [employer] Paging/Directory        Disclaimer: This note consists of symbols derived from keyboarding, dictation and/or voice recognition software. As a result, there may be errors in the script that have gone undetected. Please consider this when interpreting information found in this chart.

## 2023-06-19 NOTE — ED PROVIDER NOTES
History     Chief Complaint:  Arm Swelling and concern for clot       The history is provided by the patient.      Noah Morrison is a 58 year old male with a history of esophageal adenocarcinoma who presents to the ED via private vehicle for evaluation of left arm swelling and concern for a clot on the left side of his neck that onset earlier today. He adds that he experienced some centralized abdominal pain pain yesterday and some shortness of breath and lightheadedness today. No chest pain.    Independent Historian:   Sister: Patient sister reports that he has not used his Xarelto for at least 4 to 5 days and has apparently not been taking his medications.  She reports that he seems very fatigued to her and thinks that he had given up until she got there and he tried to take more fluids and eat something.  She reports that he is spitting up especially when he lays down.  It does not seem like vomiting but she reports that it is a weird liquid.  She reports that he had previously been in [place] but asked her to come pick him up because he wants to come home to [place] where he knows he is going to spend the remainder of his life.  She said he is looking forward to his appointment with oncology on Friday and told her that he knows he has some big decisions to make regarding his life.    Review of External Notes:   Reviewed oncology telephone note from today:  Edilberto is a previous patient of Dr. Arce's. He was seen for esophageal cancer and was treated with FOLFOX + Nivolumab. He transferred care from our clinic to an oncology clinic in [place] and is now transferring care back.  He has a follow up with Dr. Arce scheduled on 6/23/23.       Per Radha, patient was recently treated for a left internal jugular, subclavian and axillary vein DVT on 5/5/23.  He was on Xarelto, but stopped it 4 days ago.  Radha said that patient had a long car ride for 18+hours from [place] to [place] after he stopped the Xarelto 4  "days ago.  They noticed the \"walnut-sized\" lump on the left side of patient's neck this morning, left hand swelling, nausea and increased coughing.  Denies shortness of breath or chest pain.       Medications:    Neurontin  Dilaudid  Atarax  Zofran  Miralax  Xarelto  Senna-docusate  Decadron  Compazine    Past Medical History:    Biliary Tree obstruction  Esophagus metastatic adenocarcinoma  Leukocytosis    Past Surgical History:    EGD     Physical Exam     Patient Vitals for the past 24 hrs:   BP Temp Temp src Pulse Resp SpO2 Weight   06/19/23 1620 -- -- -- -- -- -- 56.7 kg (125 lb)   06/19/23 1546 -- -- -- 99 13 91 % --   06/19/23 1545 94/62 -- -- 100 12 91 % --   06/19/23 1527 -- -- -- 103 12 91 % --   06/19/23 1517 (!) 84/62 -- -- 102 10 90 % --   06/19/23 1507 92/65 -- -- 102 11 -- --   06/19/23 1434 -- -- -- -- 13 94 % --   06/19/23 1433 -- -- -- 104 14 96 % --   06/19/23 1432 -- -- -- 103 12 93 % --   06/19/23 1431 -- -- -- 102 12 93 % --   06/19/23 1430 94/69 -- -- 102 16 91 % --   06/19/23 1429 -- -- -- 105 18 93 % --   06/19/23 1428 -- -- -- 103 10 91 % --   06/19/23 1427 -- -- -- 103 10 93 % --   06/19/23 1415 96/70 -- -- 103 -- 92 % --   06/19/23 1414 -- -- -- -- 11 -- --   06/19/23 1406 (!) 83/68 -- -- 111 10 96 % --   06/19/23 1325 (!) 86/66 98.5  F (36.9  C) Temporal 119 19 94 % --      Physical Exam  General: Thin, pale  Eyes: PERRL, conjunctivae pink no scleral icterus or conjunctival injection  ENT:  Moist mucus membranes, posterior oropharynx clear without erythema or exudates  Respiratory:  Lungs clear to auscultation bilaterally, no crackles/rubs/wheezes.  Good air movement  CV: Mildly tachycardic rate and rhythm, no murmurs/rubs/gallops  GI:  Abdomen soft and non-distended.  Normoactive BS.  No tenderness, guarding or rebound  Skin: Warm, dry.  No rashes or petechiae  Musculoskeletal: No peripheral edema or calf tenderness  Neuro: Alert and oriented to person/place/time  Psychiatric: Normal " affect    Emergency Department Course   ECG  ECG taken at 1411, ECG read at 1415  Sinus tachycardia  Nonspecific T wave abnormality  Prolonged QT   Rate 102 bpm. UT interval 132 ms. QRS duration 84 ms. QT/QTc 420/547 ms. P-R-T axes 53 79 42.     Imaging:  US Upper Extremity Venous Duplex Left   Final Result   IMPRESSION: Improving DVT in the left upper extremity.        FORD LAZCANO MD            SYSTEM ID:  [id number]      CTA Neck with Contrast   Final Result   IMPRESSION:   1.  Minor carotid artery atherosclerosis. No dissection or   hemodynamically significant narrowing in the neck by NASCET criteria.   2.  The arterial timing of the contrast bolus does not allow for   assessment of the previously demonstrated left internal jugular   thrombus.   3.  Mildly progressed cervical lymphadenopathy.   4.  Esophageal wall appears more thickened and enhancing than on   prior, potentially with some surrounding inflammatory change.   5.  Bilateral pleural effusions       HIMANSHU CHAU MD            SYSTEM ID:  [id number]      CTA Chest with Contrast   Final Result   IMPRESSION:   1.  No acute aortic or arterial findings.      2.  SVC is patent. Right internal jugular and subclavian veins are   patent. Left innominate and subclavian veins are not well evaluated on   arterial phase imaging.      3.  Fluid distention with increased mucosal enhancement involving the   esophagus with irregular ulcerated circumferential mass at the distal   esophagus/GE junction. The stomach is also markedly distended with   fluid. Duodenum is relatively decompressed although not fully   evaluated. Query possible stomach outlet obstructive process. Patient   may benefit from NG decompression.      4.  Progressive metastatic disease with increased findings of   lymphangitic metastatic disease involving the right greater than left   lung and likely accounting for the increased soft tissue thickening   involving the right greater than left  mario.      5.  New trace perihepatic ascites. The gallbladder is distended   without wall thickening to suggest acute cholecystitis. Increased   periportal edema, nonspecific.      6.  Additional findings are similar to the prior exam.      Findings discussed with Dr. Angeles at the time of this interpretation.      ABRAM CHILDS MD            SYSTEM ID:  [id number]         Report per radiology    Laboratory:  Labs Ordered and Resulted from Time of ED Arrival to Time of ED Departure   BASIC METABOLIC PANEL - Abnormal       Result Value    Sodium 132 (*)     Potassium 3.7      Chloride 97 (*)     Carbon Dioxide (CO2) 25      Anion Gap 10      Urea Nitrogen 11.5      Creatinine 0.76      Calcium 8.0 (*)     Glucose 124 (*)     GFR Estimate >90     CBC WITH PLATELETS AND DIFFERENTIAL - Abnormal    WBC Count 24.7 (*)     RBC Count 3.44 (*)     Hemoglobin 10.1 (*)     Hematocrit 32.5 (*)     MCV 95      MCH 29.4      MCHC 31.1 (*)     RDW 18.0 (*)     Platelet Count 379      % Neutrophils 91      % Lymphocytes 3      % Monocytes 5      % Eosinophils 0      % Basophils 0      % Immature Granulocytes 1      NRBCs per 100 WBC 0      Absolute Neutrophils 22.2 (*)     Absolute Lymphocytes 0.7 (*)     Absolute Monocytes 1.3      Absolute Eosinophils 0.0      Absolute Basophils 0.1      Absolute Immature Granulocytes 0.3      Absolute NRBCs 0.0     CBC WITH PLATELETS - Abnormal    WBC Count 26.1 (*)     RBC Count 3.06 (*)     Hemoglobin 9.1 (*)     Hematocrit 29.2 (*)     MCV 95      MCH 29.7      MCHC 31.2 (*)     RDW 18.1 (*)     Platelet Count 331     MAGNESIUM - Normal    Magnesium 2.2        Emergency Department Course & Assessments:  Interventions:  Medications   lidocaine 1 % 0.1-1 mL (has no administration in time range)   lidocaine (LMX4) cream (has no administration in time range)   sodium chloride (PF) 0.9% PF flush 3 mL (has no administration in time range)   sodium chloride (PF) 0.9% PF flush 3 mL (has no  administration in time range)   heparin infusion 25,000 units in D5W 250 mL ANTICOAGULANT (1,000 Units/hr Intravenous $New Bag 6/19/23 1637)   HYDROmorphone (DILAUDID) injection 0.2 mg (0.2 mg Intravenous $Given 6/19/23 1800)   iopamidol (ISOVUE-370) solution 500 mL (100 mLs Intravenous $Given 6/19/23 1437)   CT scan flush use (80 mLs As instructed $Given 6/19/23 1437)   heparin ANTICOAGULANT Loading dose for HIGH INTENSITY TREATMENT * Give BEFORE starting heparin infusion (4,550 Units Intravenous $Given 6/19/23 1636)      Assessments:  1346 I obtained history and examined the patient as noted above.  1554 I rechecked and updated the patient.    Independent Interpretation (X-rays, CTs, rhythm strip):  None    Consultations/Discussion of Management or Tests:  1525 I spoke with Dr. Turner from radiology.  1800  I spoke with the hospitalist Dr. Diop regarding patient admission    Social Determinants of Health affecting care:   None    Disposition:  The patient was admitted to the hospital under the care of Dr. Diop.     Impression & Plan    Medical Decision Making:  Noah Morrison is a 58 year old male with a history of metastatic esophageal carcinoma with a history of left upper extremity DVT who comes with weight loss, generalized weakness, poor p.o. intake and now left arm swelling since he has been off of his Xarelto for the past 4 to 5 days.  On arrival blood pressures were soft and he was mildly tachycardic.  He was treated with IV fluids.  I started him on heparin given that he is been off of his anticoagulation.   CT of the head and neck were obtained.  No obvious PE or aortic clot.  It is unclear if he has a jugular vein thrombosis but clinically I suspect this.  He does have a DVT that is demonstrated on the left upper extremity ultrasound.  The radiologist also discussed with me that he has signs of severe gastric outlet obstruction which would account for many of his symptoms.  There was also  evidence of progression of the metastatic esophageal carcinoma from the scan a month ago.  I discussed all the findings with the patient.  Initially he wished to be discharged [place] cabin.  However, I think that he would benefit from admission for IV fluid hydration and early consultation with his oncologist to discuss palliative care versus hospice.  The patient is ready for this and is comfortable with this plan.  I discussed with Dr. Diop who graciously agreed to admit the patient and recommended admission to the observation unit.  Patient will likely need to be transferred to [facility] as it is unlikely he is going to be able to take the DOAC in the future.  This can occur on the observation unit.  Patient and his sisters were in agreement with the plan.    Diagnosis:    ICD-10-CM    1. Metastatic squamous cell carcinoma to esophagus (H)  C78.89       2. Acute deep vein thrombosis (DVT) of left upper extremity, unspecified vein (H)  I82.622       3. Gastric outlet obstruction  K31.1       4. Dehydration  E86.0            Discharge Medications:  New Prescriptions    No medications on file          Scribe Disclosure:  I, DORIE GALLEGOS, am serving as a scribe at 3:34 PM on 6/19/2023 to document services personally performed by Michell Angeles MD based on my observations and the provider's statements to me.   6/19/2023   Michell Angeles MD Cho, Amy C, MD  06/19/23 3308

## 2023-06-19 NOTE — ED TRIAGE NOTES
Lump on l sided of neck accompanied by LUE swelling and SOB.  Hx of esophageal cancer, recently completed regimen of chemo.  Ran out of xarel on Thursday and just drove back from [place] (18 hours in a vehicle).  Hypotensive and tachycardic in triage.  Coughing up phlegm. Has right chest wall port a cath.

## 2023-06-19 NOTE — TELEPHONE ENCOUNTER
Rodrigor received a refill request from [facility] in [place] requesting a refill for Xarelto 20 mg daily for Edilberto. Per chart review, he was previous on Eliquis. He transferred care from our clinic to an Oncology Clinic in [place] and is now transferring back. He has a follow up appointment with Dr. Arce scheduled on 06/23/23. Writer will route to Dr. Arce for recommendations.     Louise Willis RN on 6/19/2023 at 8:23 AM

## 2023-06-19 NOTE — PHARMACY-ADMISSION MEDICATION HISTORY
Pharmacist Admission Medication History    Admission medication history is complete. The information provided in this note is only as accurate as the sources available at the time of the update.    Medication reconciliation/reorder completed by provider prior to medication history? Yes    Information Source(s): Patient/ Family via in-person    Pertinent Information: none    Changes made to PTA medication list:    Added: megestrol, oxycodone    Deleted: zofran 6 mg, miralax, dilaudid    Changed: senna    Medication Affordability:  Not including over the counter (OTC) medications, was there a time in the past 3 months when you did not take your medications as prescribed because of cost?: No    Allergies reviewed with patient and updates made in EHR: yes    Medication History Completed By: Chucho Bansal RPH 6/19/2023 6:45 PM    Prior to Admission medications    Medication Sig Last Dose Taking? Auth Provider Long Term End Date   acetaminophen (TYLENOL) 325 MG tablet Take 650 mg by mouth as needed  Yes Reported, Patient     gabapentin (NEURONTIN) 100 MG capsule Take 2 capsules (200 mg) by mouth 3 times daily 6/19/2023 at x1 Yes Zana Mckenzie MD Yes    hydrOXYzine (ATARAX) 25 MG tablet Take 1-2 tablets (25-50 mg) by mouth 3 times daily as needed for other (Adjuvant for pain) 6/19/2023 at x1 Yes Zana Mckenzie MD     megestrol (MEGACE) 40 MG/ML suspension Take 800 mg by mouth daily 6/19/2023 at am Yes Unknown, Entered By History Yes    ondansetron (ZOFRAN ODT) 4 MG ODT tab Take 1 tablet (4 mg) by mouth every 6 hours as needed for nausea or vomiting  Yes Zana Mckenzie MD     oxyCODONE IR (ROXICODONE) 10 MG tablet Take 5 mg by mouth every 8 hours as needed for severe pain 6/19/2023 at 0730 Yes Unknown, Entered By History No    prochlorperazine (COMPAZINE) 10 MG tablet Take 10 mg by mouth every 6 hours as needed for nausea or vomiting  Yes Reported, Patient     rivaroxaban ANTICOAGULANT (XARELTO) 20 MG TABS tablet Take 1  tablet (20 mg) by mouth daily (with dinner) Past Week Yes Maria Alejandra Arce M, DO Yes    senna-docusate (SENOKOT-S/PERICOLACE) 8.6-50 MG tablet Take 1 tablet by mouth 2 times daily  Patient taking differently: Take 1 tablet by mouth 2 times daily as needed  Yes Zana Mckenzie MD     dexamethasone (DECADRON) 4 MG tablet Take 4 mg by mouth 2 times daily (with meals) 2-4 days after chemo  Patient not taking: Reported on 6/19/2023 Not Taking  Reported, Patient Yes

## 2023-06-20 NOTE — ED NOTES
[facility]  ED Nurse Handoff Report    ED Chief complaint: Arm Swelling and concern for clot  . ED Diagnosis:   Final diagnoses:   Metastatic squamous cell carcinoma to esophagus (H)   Acute deep vein thrombosis (DVT) of left upper extremity, unspecified vein (H)   Gastric outlet obstruction   Dehydration       Allergies: No Known Allergies    Code Status: Full Code    Activity level - Baseline/Home:  standby.  Activity Level - Current:   standby.   Lift room needed: No.   Bariatric: No   Needed: No   Isolation: No.   Infection: Not Applicable.     Respiratory status: Room air    Vital Signs (within 30 minutes):   Vitals:    06/19/23 1741 06/19/23 1751 06/19/23 1801 06/19/23 1811   BP: 96/64      Pulse: 101 101 98 98   Resp:  11 14 12   Temp:       TempSrc:       SpO2:  94% 92% 92%   Weight:           Cardiac Rhythm:  ,      Pain level:    Patient confused: No.   Patient Falls Risk: arm band in place, patient and family education, and activity supervised.   Elimination Status:  has not needed to void.       Patient Report - Initial Complaint: Noah Morrison is a 58 year old male with a history of esophageal adenocarcinoma who presents to the ED via private vehicle for evaluation of left arm swelling and concern for a clot on the left side of his neck that onset earlier today. He adds that he experienced some centralized abdominal pain pain yesterday and some shortness of breath and lightheadedness today. No chest pain..   Focused Assessment: swelling of left arm, swelling on left neck, generalized weight loss.      Abnormal Results:   Labs Ordered and Resulted from Time of ED Arrival to Time of ED Departure   BASIC METABOLIC PANEL - Abnormal       Result Value    Sodium 132 (*)     Potassium 3.7      Chloride 97 (*)     Carbon Dioxide (CO2) 25      Anion Gap 10      Urea Nitrogen 11.5      Creatinine 0.76      Calcium 8.0 (*)     Glucose 124 (*)     GFR Estimate >90     CBC WITH  PLATELETS AND DIFFERENTIAL - Abnormal    WBC Count 24.7 (*)     RBC Count 3.44 (*)     Hemoglobin 10.1 (*)     Hematocrit 32.5 (*)     MCV 95      MCH 29.4      MCHC 31.1 (*)     RDW 18.0 (*)     Platelet Count 379      % Neutrophils 91      % Lymphocytes 3      % Monocytes 5      % Eosinophils 0      % Basophils 0      % Immature Granulocytes 1      NRBCs per 100 WBC 0      Absolute Neutrophils 22.2 (*)     Absolute Lymphocytes 0.7 (*)     Absolute Monocytes 1.3      Absolute Eosinophils 0.0      Absolute Basophils 0.1      Absolute Immature Granulocytes 0.3      Absolute NRBCs 0.0     CBC WITH PLATELETS - Abnormal    WBC Count 26.1 (*)     RBC Count 3.06 (*)     Hemoglobin 9.1 (*)     Hematocrit 29.2 (*)     MCV 95      MCH 29.7      MCHC 31.2 (*)     RDW 18.1 (*)     Platelet Count 331     MAGNESIUM - Normal    Magnesium 2.2          US Upper Extremity Venous Duplex Left   Final Result   IMPRESSION: Improving DVT in the left upper extremity.        FORD LAZCANO MD            SYSTEM ID:  [id number]      CTA Neck with Contrast   Final Result   IMPRESSION:   1.  Minor carotid artery atherosclerosis. No dissection or   hemodynamically significant narrowing in the neck by NASCET criteria.   2.  The arterial timing of the contrast bolus does not allow for   assessment of the previously demonstrated left internal jugular   thrombus.   3.  Mildly progressed cervical lymphadenopathy.   4.  Esophageal wall appears more thickened and enhancing than on   prior, potentially with some surrounding inflammatory change.   5.  Bilateral pleural effusions       HIMANSHU CHAU MD            SYSTEM ID:  [id number]      CTA Chest with Contrast   Final Result   IMPRESSION:   1.  No acute aortic or arterial findings.      2.  SVC is patent. Right internal jugular and subclavian veins are   patent. Left innominate and subclavian veins are not well evaluated on   arterial phase imaging.      3.  Fluid distention with increased  mucosal enhancement involving the   esophagus with irregular ulcerated circumferential mass at the distal   esophagus/GE junction. The stomach is also markedly distended with   fluid. Duodenum is relatively decompressed although not fully   evaluated. Query possible stomach outlet obstructive process. Patient   may benefit from NG decompression.      4.  Progressive metastatic disease with increased findings of   lymphangitic metastatic disease involving the right greater than left   lung and likely accounting for the increased soft tissue thickening   involving the right greater than left mario.      5.  New trace perihepatic ascites. The gallbladder is distended   without wall thickening to suggest acute cholecystitis. Increased   periportal edema, nonspecific.      6.  Additional findings are similar to the prior exam.      Findings discussed with Dr. Angeles at the time of this interpretation.      ABRAM CHILDS MD            SYSTEM ID:  [id number]          Treatments provided: Heparin   Family Comments: family at bedside.   OBS brochure/video discussed/provided to patient:  Yes  ED Medications:   Medications   lidocaine 1 % 0.1-1 mL (has no administration in time range)   lidocaine (LMX4) cream (has no administration in time range)   sodium chloride (PF) 0.9% PF flush 3 mL (has no administration in time range)   sodium chloride (PF) 0.9% PF flush 3 mL (has no administration in time range)   heparin infusion 25,000 units in D5W 250 mL ANTICOAGULANT (1,000 Units/hr Intravenous $New Bag 6/19/23 1637)   HYDROmorphone (DILAUDID) injection 0.2 mg (0.2 mg Intravenous $Given 6/19/23 1800)   iopamidol (ISOVUE-370) solution 500 mL (100 mLs Intravenous $Given 6/19/23 1437)   CT scan flush use (80 mLs As instructed $Given 6/19/23 1437)   heparin ANTICOAGULANT Loading dose for HIGH INTENSITY TREATMENT * Give BEFORE starting heparin infusion (4,550 Units Intravenous $Given 6/19/23 1636)       Drips infusing:  Yes  For the  majority of the shift this patient was Green.   Interventions performed were .    Sepsis treatment initiated: No    Cares/treatment/interventions/medications to be completed following ED care:     ED Nurse Name: Chandler Kothari RN  7:18 PM     RECEIVING UNIT ED HANDOFF REVIEW    Above ED Nurse Handoff Report was reviewed: Yes  Reviewed by: Priyanka Florence RN on June 19, 2023 at 8:07 PM

## 2023-06-20 NOTE — CONSULTS
"Palliative Care Consultation Note  [facility]      Patient: Noah Morrison  Date of Admission:  6/19/2023    Requesting Clinician / Team: Dr. Azeem Diop, hospitalist  Reason for consult: Symptom management  Goals of care  Decisional support     Recommendations & Counseling     GOALS OF CARE:     Restorative without limits - patient wishes to have work up and management per oncology recommendations.  Stated he wishes to focus on immediate future as opposed to \"down the road\" information or decisions.    ADVANCE CARE PLANNING:    No health care directive on file. Per  informed consent policy next of kin should be involved if patient becomes unable.    There is no POLST form on file, plan to complete prior to DC.    Code status: Full Code    MEDICAL MANAGEMENT:   #Pain,sub acute, associated with intraabdominal malignancy.    Opioids: hydromorphone (Dilaudid) - agree with IV formulation while patient is NPO    fentanyl patch start 12 mcg/hour today and titrate to tolerable pain.    Acetaminophen (Tylenol), PRN    NSAIDs:  avoid due to anticoagulation    Heat, Ice and Meditation     #Dyspnea, patient requiring supplemental oxygen since admission.      Oxygen    Repositioning    management of underlying etiology    #General Weakness/Debility     Physical Therapy    #Dysphagia    Nutrition Consult - following GI work up completion    Diet advancement per plan of care conversations.       PSYCHOSOCIAL/SPIRITUAL SUPPORT:    Family - three sisters live in the [facility].    Palliative Care will continue to follow. Thank you for the consult and allowing us to aid in the care of Noah Morrison.    These recommendations have been discussed with primary team, nursing staff.    KATERYNA Pham CNP  Securely message with [employer] (more info)  Text page via [facility] Paging/Directory       Palliative Summary/HPI     Noah Morrison is a 58 year old male with a past medical history of metastatic " "esophageal cancer (diagnosed and managed in [place] initially, later moved to [place] per family request/need for support), recent DVT with inconsistent use of anticoagulation, who presented on 6/19 with lump on the neck and left arm swelling.  He had additional complaints of fatigue and nausea.      Today, the patient was seen for:  Goals of care, introduction to palliative care    Palliative Care Summary:   Met with patient and sister.   I introduced our role as an extra layer of support and how we help patients and families dealing with serious, potentially life-limiting illnesses. I explained the composition of the palliative care team.  Palliative care helps patients and families navigate their care while focusing on the whole person; providing emotional, social and spiritual support  Palliative care often assists with symptom management, information sharing about what to expect from the illness, available treatment options and what effect those options may have on the disease course, and provide effective communication and caring support.     Reviewed oncologic history and moves between [place] and [place] over the past 2 years.  Discussed his previous chemotherapy treatment and side effects.  Discussed his goal to discuss plan of care further with oncology and undergo further work up during inpatient stay.      He stated strong preference to \"take things one day at a time\" and mentioned that he did not want to explore future possibilities at this time.  Offered support for symptom management and review of [id number] if needed.      He denied further questions or concerns.    Prognosis, Goals, & Planning:      Functional Status just prior to this current hospitalization:    has had no recent hospitalizations, weight loss or loss of function.  - patient's family notes decreased PO intake and inconsistent weights       Prognosis, Goals, and/or Advance Care Planning:    We discussed general " treatment options (full/restorative, selective/conservatives, and comfort only/hospice). We then discussed how these specifically apply to Edilberto and his plan of care. Based on this discussion, goal for restorative measures and further work up by oncology and GI      Code Status was addressed today:     Yes, We discussed potential risks and rationale of attempting cardiac resuscitation, intubation, and mechanical ventilation.  We also discussed probability of survival as well as quality of life implications.  Based on this discussion, patient or surrogate response/decision: confirmed FULL code        Patient's decision making preferences: shared with support from loved ones          Patient has decision-making capacity today for complex decisions:Intact            Coping, Meaning, & Spirituality:     Mood, coping, and/or meaning in the context of serious illness were addressed today: Yes    Social:   Living situation:lives with family  Important relationships/caregivers:three sisters  Occupation: [employer]    Medications:  I have reviewed this patient's medication profile and medications from this hospitalization.      ROS:  Comprehensive ROS is reviewed and is negative except as here & per HPI:     Physical Exam   Vital Signs with Ranges  Temp:  [97.9  F (36.6  C)-98.6  F (37  C)] 98.5  F (36.9  C)  Pulse:  [] 90  Resp:  [10-20] 17  BP: ()/(62-70) 96/68  SpO2:  [88 %-97 %] 95 %  128 lbs 0 oz    PHYSICAL EXAM:  Constitutional: healthy, alert and no distress   Cardiovascular:  PMI normal. No lifts, heaves, or thrills. RRR. No murmurs, clicks gallops or rub  Respiratory: negative, Percussion normal. Good diaphragmatic excursion. Lungs clear  Abdomen: positive findings: tenderness moderate generalized  NEURO: negative, no focal deficits  SKIN: no suspicious lesions or rashes and warm, dry.    Data reviewed:  Results for orders placed or performed during the hospital encounter of 06/19/23 (from the past 24  hour(s))   US Upper Extremity Venous Duplex Left    Narrative    US UPPER EXTREMITY VENOUS DUPLEX LEFT  6/19/2023 4:19 PM     HISTORY: left upper extremity swelling    COMPARISON: Ultrasound dated 5/3/2023    FINDINGS: Color Doppler and spectral waveform analysis performed.  There is nonocclusive DVT in the left internal jugular vein and in the  lateral left subclavian vein. Remaining deep veins of the left upper  extremity are patent. Overall, the thrombus has improved when compared  to prior exam.      Impression    IMPRESSION: Improving DVT in the left upper extremity.      FORD LAZCANO MD         SYSTEM ID:  [id number]   CBC with platelets   Result Value Ref Range    WBC Count 26.1 (H) 4.0 - 11.0 10e3/uL    RBC Count 3.06 (L) 4.40 - 5.90 10e6/uL    Hemoglobin 9.1 (L) 13.3 - 17.7 g/dL    Hematocrit 29.2 (L) 40.0 - 53.0 %    MCV 95 78 - 100 fL    MCH 29.7 26.5 - 33.0 pg    MCHC 31.2 (L) 31.5 - 36.5 g/dL    RDW 18.1 (H) 10.0 - 15.0 %    Platelet Count 331 150 - 450 10e3/uL   Basic metabolic panel   Result Value Ref Range    Sodium 132 (L) 136 - 145 mmol/L    Potassium 3.6 3.4 - 5.3 mmol/L    Chloride 99 98 - 107 mmol/L    Carbon Dioxide (CO2) 24 22 - 29 mmol/L    Anion Gap 9 7 - 15 mmol/L    Urea Nitrogen 8.7 8.0 - 23.0 mg/dL    Creatinine 0.61 (L) 0.67 - 1.17 mg/dL    Calcium 7.5 (L) 8.8 - 10.2 mg/dL    Glucose 124 (H) 70 - 99 mg/dL    GFR Estimate >90 >60 mL/min/1.73m2   CBC with platelets   Result Value Ref Range    WBC Count 16.7 (H) 4.0 - 11.0 10e3/uL    RBC Count 2.89 (L) 4.40 - 5.90 10e6/uL    Hemoglobin 8.4 (L) 13.3 - 17.7 g/dL    Hematocrit 27.8 (L) 40.0 - 53.0 %    MCV 96 78 - 100 fL    MCH 29.1 26.5 - 33.0 pg    MCHC 30.2 (L) 31.5 - 36.5 g/dL    RDW 17.8 (H) 10.0 - 15.0 %    Platelet Count 315 150 - 450 10e3/uL          Medical Decision Making     75 MINUTES SPENT BY ME on the date of service doing chart review, history, exam, documentation & further activities per the note.

## 2023-06-20 NOTE — CONSULTS
IR CONSULT NOTE    IR/General Radiology does not routinely place NG tubes for feeding or decompression. This should be done by a bedside nurse.    Bere Londono, DO  Interventional Radiology - [facility]

## 2023-06-20 NOTE — CONSULTS
"[facility]    Hematology/Oncology Consult Note      Date of Admission:  6/19/2023  Date of Consult:  06/20/23  Reason for Consult: goals of care, metastatic esophageal cancer  Previous oncologist : Dr Maria Alejandra Arce, last seen 10/2022 lives in [place]    ASSESSMENT/PLAN:  Noah Morrison is a 58 year old male with metastatic esophageal cancer that we have not seen formally for chemotherapy since October 2022    Patient just had an NG placed and he states \"he doesn't want to talk about anything right now. \" I discussed with his sister, we do not have any chemotherapy option outside the room and prognosis is very poor and hospice appropriate, I will see him tomorrow he doesn't want to discuss it today. Palliative care to follow and discuss goals of care.       Total time spent on day of visit, including review of tests, obtaining/reviewing separately obtained history, ordering medications/tests/procedures, communicating with PCP/consultants, and documenting in electronic medical record: 80 minutes      HISTORY OF PRESENT ILLNESS: Noah is a 58-year-old male with metastatic esophageal adenocarcinoma PD-L1 CPS 5, HER2 negative, JUNIOR previously followed by Dr. Arce and now getting palliative chemotherapy in [place].  Patient was last seen by our group in May 2023 and at that time he had presented with abdominal pain found to have DVT and progressive cancer.  He subsequently then followed up in [place] for his cancer care.  He called the clinic yesterday requesting a refill on his Xarelto Due to a left internal jugular subclavian and axial DVT and he is on lifelong anticoagulation.  When patient called the clinic he complained of a \"walnut sized lump\" on the left side of his neck left neck swelling and hand swelling nausea and coughing.  Patient then presented to the emergency room yesterday CT of chest was obtained which showed fluid distention with increased mucosal enhancement along the " "esophagus with irregular ulcerated circumferential mass with markedly distended stomach.  Progressive metastatic disease with increased findings of lymphangitic metastatic disease involving the right greater then left lung likely accounting for the increased soft tissue thickening involving the right greater than left mario.  New trace perihepatic ascites.  Upper extremity ultrasound showed improving DVT on the left side. he most recently has been on Ramicurumab and taxol with neulasta    He had been feeling fatigued without much appetite with increasing weakness. It was difficult for him to care for himself. His sister drove to [place] late last week to drive him back to [place] permanently. She reports he had not eaten in a couple days due to \"lack of motivation\" rather than due to dysphagia, nausea, or abdominal pain/distension. He was eating solid food without issues on the drive back to [place]. They arrived in [place] 3 days ago. He denies any problems with dysphagia, nausea, vomiting. He has chronic abdominal pain since he was diagnosed last year. He has had intermittent vomiting related to chemotherapy but nothing prior to admission. Denies melena, hematochezia.       He was diagnosed with a left jugular/subclavian DVT in [place] in early May and placed on Xarelto. He stopped Xarelto about 5 days ago. He drove with his sister from [place] to [place] recently. This morning he noticed a lump on his neck, left hand swelling.They contacted his prior oncology office and was directed to the ER.      Gi saw the patient and he is NPO IV PPI, ct findings consitent with GOLUCY Godoy seen by PA awaiting attending recs for EGD and stent.  He just got an NG in and is upset      REVIEW OF SYSTEMS:   14 point ROS was reviewed and is negative other than as noted above in HPI.       MEDICATIONS:  Current Facility-Administered Medications   Medication     dextrose 5% and 0.45% NaCl + KCl 20 mEq/L infusion     enoxaparin ANTICOAGULANT " (LOVENOX) injection 60 mg     HYDROmorphone (DILAUDID) injection 0.2 mg     lidocaine (LMX4) cream     lidocaine 1 % 0.1-1 mL     melatonin tablet 1 mg     naloxone (NARCAN) injection 0.2 mg    Or     naloxone (NARCAN) injection 0.4 mg    Or     naloxone (NARCAN) injection 0.2 mg    Or     naloxone (NARCAN) injection 0.4 mg     ondansetron (ZOFRAN ODT) ODT tab 4 mg    Or     ondansetron (ZOFRAN) injection 4 mg     pantoprazole (PROTONIX) IV push injection 40 mg     senna-docusate (SENOKOT-S/PERICOLACE) 8.6-50 MG per tablet 1-2 tablet     sodium chloride (PF) 0.9% PF flush 3 mL     sodium chloride (PF) 0.9% PF flush 3 mL         ALLERGIES:  No Known Allergies      PAST MEDICAL HISTORY:  No past medical history on file.      PAST SURGICAL HISTORY:  No past surgical history on file.      SOCIAL HISTORY:  Social History     Socioeconomic History     Marital status: Single     Spouse name: Not on file     Number of children: Not on file     Years of education: Not on file     Highest education level: Not on file   Occupational History     Not on file   Tobacco Use     Smoking status: Former     Smokeless tobacco: Never   Vaping Use     Vaping status: Not on file   Substance and Sexual Activity     Alcohol use: Yes     Drug use: Yes     Types: Marijuana     Sexual activity: Not on file   Other Topics Concern     Not on file   Social History Narrative     Not on file     Social Determinants of Health     Financial Resource Strain: Not on file   Food Insecurity: Not on file   Transportation Needs: Not on file   Physical Activity: Not on file   Stress: Not on file   Social Connections: Not on file   Intimate Partner Violence: Not At Risk (10/26/2022)    Humiliation, Afraid, Rape, and Kick questionnaire      Fear of Current or Ex-Partner: No      Emotionally Abused: No      Physically Abused: No      Sexually Abused: No   Housing Stability: Not on file         FAMILY HISTORY:  none    PHYSICAL EXAM:  Vital signs:  Temp: 98.5  " F (36.9  C) Temp src: Oral BP: 96/68 Pulse: 90   Resp: 17 SpO2: 95 % O2 Device: Nasal cannula Oxygen Delivery: 1 LPM Height: 172.7 cm (5' 8\") Weight: 58.1 kg (128 lb)  Estimated body mass index is 19.46 kg/m  as calculated from the following:    Height as of this encounter: 1.727 m (5' 8\").    Weight as of this encounter: 58.1 kg (128 lb).    ECO  GENERAL/CONSTITUTIONAL: No acute distress.cachetic, ng in place  EYES: Pupils are equal, round, and react to light and accommodation. Extraocular movements intact.  No scleral icterus.  ENT/MOUTH: Neck supple. Oropharynx clear, no mucositis.  LYMPH: No anterior cervical, posterior cervical, supraclavicular, axillary or inguinal adenopathy.   RESPIRATORY: Clear to auscultation bilaterally. No crackles or wheezing.   CARDIOVASCULAR: Regular rate and rhythm without murmurs, gallops, or rubs.  GASTROINTESTINAL: No hepatosplenomegaly, masses, or tenderness. The patient has normal bowel sounds. No guarding.  No distention.  MUSCULOSKELETAL: Warm and well-perfused, no cyanosis, clubbing, or edema.  NEUROLOGIC: Cranial nerves II-XII are intact. Alert, oriented, answers questions appropriately.  INTEGUMENTARY: No rashes or jaundice.  GAIT: Steady, does not use assistive device      LABS:  CBC RESULTS:   Recent Labs   Lab Test 23  0440   WBC 16.7*   RBC 2.89*   HGB 8.4*   HCT 27.8*   MCV 96   MCH 29.1   MCHC 30.2*   RDW 17.8*          Recent Labs   Lab Test 23  0440 23  1404   * 132*   POTASSIUM 3.6 3.7   CHLORIDE 99 97*   CO2 24 25   ANIONGAP 9 10   * 124*   BUN 8.7 11.5   CR 0.61* 0.76   JILL 7.5* 8.0*         PATHOLOGY:      IMAGING:            Thank you for the opportunity to participate in this patient's care.  Please call with any questions.    Malcolm Pascual MD  Hematology/Oncology  [facility]  "

## 2023-06-20 NOTE — CONSULTS
"Physician Attestation    I, Tom Wan, saw and evaluated Noah Morrison and I have reviewed and discussed with the advanced practice provider their history, physical and plan.    I personally reviewed the vital signs, medications, labs and imaging.  Total time spent reviewing case, films, and discussing with patient and radiology 25 minutes.    Exam:  Vital signs:  Temp: 98.5  F (36.9  C) Temp src: Oral BP: 96/68 Pulse: 90   Resp: 17 SpO2: 95 % O2 Device: Nasal cannula Oxygen Delivery: 1 LPM Height: 172.7 cm (5' 8\") Weight: 58.1 kg (128 lb)  Estimated body mass index is 19.46 kg/m  as calculated from the following:    Height as of this encounter: 1.727 m (5' 8\").    Weight as of this encounter: 58.1 kg (128 lb).    Agree with H&P and plan as below.  Endoscopy likely will be necessary at some point in the near future to evaluate because of gastric distention/what appears to be gastric outlet obstruction.  However, patient will need NG tube decompression prior to any planned endoscopic intervention.  Discussed case with interventional radiology.  CT scans reviewed with interventional radiology.  They do not feel that there is any type of obstructing component to the esophageal lesion and therefore recommend attempts at NG tube placement on the floor by nursing first.  If NG tube cannot be placed on the floor, then the next step would involve general radiology followed by interventional radiology per their protocol.    Thank you for allowing me to participate in this patient's healthcare. Please call any further questions.    Tom Wan MD, FACG  [facility]  [phone]        GASTROENTEROLOGY CONSULTATION      Noah Morrison  [address] [place] [address]  58 year old male     Admission Date/Time: 6/19/2023  Primary Care Provider: No Ref-Primary, Physician     We were asked to see the patient in consultation by VALERIANO Tavarez for evaluation of esophageal cancer, " "abnormal CT esophagus.    CC: lump on neck, dysphagia     HPI:  Noah Morrison is a 58 year old male with history of metastatic esophageal cancer previously followed by [facility] oncology (Dr. Arce), recent DVT, admitted 6/19 with developing a lump on his neck with CT imaging progressive metastatic disease in the lung, with fluid distended esophagus and associated distal esophageal mass, distended stomach concerning for gastric outlet obstruction.     Patient seen with his sister present. Patient has a known history of metastatic esophageal cancer diagnosed in [place] in August 2022. He started chemotherapy there but moved to [place] in the fall for a period of time and received chemotherapy through [facility]. He was feeling better and moved back to [place]. They report he was getting a different chemotherapy recently due to new cancer findings in his lung. He had been feeling fatigued without much appetite with increasing weakness. It was difficult for him to care for himself. His sister drove to [place] late last week to drive him back to [place] permanently. She reports he had not eaten in a couple days due to \"lack of motivation\" rather than due to dysphagia, nausea, or abdominal pain/distension. He was eating solid food without issues on the drive back to [place]. They arrived in [place] 3 days ago. He denies any problems with dysphagia, nausea, vomiting. He has chronic abdominal pain since he was diagnosed last year. He has had intermittent vomiting related to chemotherapy but nothing prior to admission. Denies melena, hematochezia.      He was diagnosed with a left jugular/subclavian DVT in [place] in early May and placed on Xarelto. He stopped Xarelto about 5 days ago. He drove with his sister from [place] to [place] recently. This morning he noticed a lump on his neck, left hand swelling.They contacted his prior oncology office and was directed to the ER.      CTA chest with contrast showed fluid distention with increased " mucosal enhancement involving esophagus with irregular ulcerated circumferential mass at the distal esophagus/EG junction.  Stomach markedly distended with fluid.  Duodenum relatively decompressed although not fully evaluated with concern for gastric outlet obstruction, progressive metastatic disease with increased findings of lymphangitic metastatic disease involving the right greater than left lung and likely accounting for increased soft tissue thickening involving the great greater then left mario, new trace perihepatic ascites, distended gallbladder without thickening, increased periportal edema, common bile duct stent partially visualized.    CTA of the neck with contrast mild progressed cervical lymphadenopathy, esophageal wall thickening and enhancement, potentially related to surrounding inflammatory change, bilateral pleural effusions.    Left upper extremity ultrasound with improving DVT in the left internal jugular vein and the left lateral subclavian vein.   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       MEDICATIONS:   Prior to Admission medications    Medication Sig Start Date End Date Taking? Authorizing Provider   acetaminophen (TYLENOL) 325 MG tablet Take 650 mg by mouth as needed   Yes Reported, Patient   gabapentin (NEURONTIN) 100 MG  "capsule Take 2 capsules (200 mg) by mouth 3 times daily 5/5/23  Yes Zana Mckenzie MD   hydrOXYzine (ATARAX) 25 MG tablet Take 1-2 tablets (25-50 mg) by mouth 3 times daily as needed for other (Adjuvant for pain) 5/5/23  Yes Zana Mckenzie MD   megestrol (MEGACE) 40 MG/ML suspension Take 800 mg by mouth daily   Yes Unknown, Entered By History   ondansetron (ZOFRAN ODT) 4 MG ODT tab Take 1 tablet (4 mg) by mouth every 6 hours as needed for nausea or vomiting 5/5/23  Yes Zana Mckenzie MD   oxyCODONE IR (ROXICODONE) 10 MG tablet Take 5 mg by mouth every 8 hours as needed for severe pain   Yes Unknown, Entered By History   prochlorperazine (COMPAZINE) 10 MG tablet Take 10 mg by mouth every 6 hours as needed for nausea or vomiting   Yes Reported, Patient   rivaroxaban ANTICOAGULANT (XARELTO) 20 MG TABS tablet Take 1 tablet (20 mg) by mouth daily (with dinner) 6/19/23  Yes Maria Alejandra Arce DO   senna-docusate (SENOKOT-S/PERICOLACE) 8.6-50 MG tablet Take 1 tablet by mouth 2 times daily  Patient taking differently: Take 1 tablet by mouth 2 times daily as needed 5/5/23  Yes Zana Mckenzie MD   dexamethasone (DECADRON) 4 MG tablet Take 4 mg by mouth 2 times daily (with meals) 2-4 days after chemo  Patient not taking: Reported on 6/19/2023    Reported, Patient        ALLERGIES: No Known Allergies     SOCIAL HISTORY:  Social History     Tobacco Use     Smoking status: Former     Smokeless tobacco: Never   Substance Use Topics     Alcohol use: Yes     Drug use: Yes     Types: Marijuana        FAMILY HISTORY:  No family history on file.     PHYSICAL EXAM:   /66 (BP Location: Right arm)   Pulse 96   Temp 97.9  F (36.6  C) (Oral)   Resp 18   Ht 1.727 m (5' 8\")   Wt 58.1 kg (128 lb)   SpO2 97%   BMI 19.46 kg/m       PHYSICAL EXAM:  General: alert, oriented, NAD  SKIN: no suspicious lesions, rashes, jaundice, or spider angiomas  HEAD: Normocephalic. No masses, lesions, tenderness or abnormalities  NECK: Neck supple. No " adenopathy. Thyroid symmetric, normal size.  EYES: No scleral icterus  ENT: ENT exam normal, no neck nodes or sinus tenderness  RESPIRATORY: negative, Good diaphragmatic excursion. Lungs clear  CARDIOVASCULAR: negative, PMI normal. No lifts, heaves, or thrills. RRR. No murmurs, clicks gallops or rub  GASTROINTESTINAL: +BS, soft, NT, ND, no HSM, no masses/guarding/rebound  JOINT/EXTREMITIES: extremities normal- no gross deformities noted, gait normal and normal muscle tone  NEURO: Reflexes grossly normal and symmetric. Sensation grossly WNL.  PSYCH: no abnormal anxiety/depression  LYMPH: No anterior cervical, posterior cervical, or supraclavicular adenopathy     LABS:  I reviewed the patient's new clinical lab test results.   Recent Labs   Lab Test 06/20/23  0440 06/19/23  1701 06/19/23  1404   WBC 16.7* 26.1* 24.7*   HGB 8.4* 9.1* 10.1*   MCV 96 95 95    331 379     Recent Labs   Lab Test 06/20/23  0440 06/19/23  1404 05/04/23  0602   * 132* 133*   POTASSIUM 3.6 3.7 4.0   CHLORIDE 99 97* 99   CO2 24 25 22   BUN 8.7 11.5 6.0*   ANIONGAP 9 10 12   JILL 7.5* 8.0* 8.6*     Recent Labs   Lab Test 05/04/23  0602 05/03/23  1328 05/03/23  1207 11/01/22  1230   ALBUMIN 2.8*  --  2.9* 2.7*   BILITOTAL 0.3  --  0.5 0.4   ALT 20  --  20 27   AST 23  --  18 7   ALKPHOS 148*  --  151* 89   PROTEIN  --  30*  --   --    LIPASE 763*  --  862*  --         IMAGING  I personally reviewed the patient's new imaging results.    CTA CHEST WITH CONTRAST     PROCEDURE DATE: 6/19/2023 2:58 PM      HISTORY: New left arm swelling and left neck mass, history of  esophageal cancer     COMPARISON: CT from 5/3/2023     TECHNIQUE: Helical acquisition through the chest was performed during  the arterial phase of contrast enhancement. 2D and 3D reconstructions  performed by the CT technologist. Dose reduction techniques were used.  CONTRAST: 80 mL Isovue-370     FINDINGS:   ARTERIAL FINDINGS: Heart is normal in size. Minimal  pericardial  thickening without pericardial effusion. Central pulmonary arteries  appear patent.     Thoracic aorta is normal in caliber without dissection or intramural  hematoma. Widely patent three-vessel origin off the aortic arch. No  significant arterial stenosis involving the great vessels. Descending  thoracic aorta and upper abdominal aorta are widely patent and normal  caliber. No acute arterial findings.     A right internal jugular approach port terminates at the cavoatrial  junction. Superior vena cava is opacified without evidence of SVC  thrombosis. The left brachiocephalic vein is partially visualized and  patent near its junction with the SVC. It is not well evaluated  through the superior mediastinum. Left subclavian vein is not well  evaluated on arterial phase imaging.     Azygos vein appears patent with nonopacified blood flowing into the  SVC.     NONVASCULAR:  MEDIASTINUM: New fluid distention with circumferential mucosal  enhancement and abnormal wall thickening at the distal esophagus  involving the GE junction. This is contiguous with extensive stomach  fluid distention. New generalized soft tissue thickening involving the  hilum. Mild generalized soft tissue thickening involving the left  hilar region. No mediastinal gas to suggest esophageal perforation.  Stable metastatic-appearing lymph nodes involving the left insula.     LUNGS/PLEURA: Slight interval increase in the now mild to moderate  size right pleural effusion. New trace left pleural effusion. No  pneumothorax. Increased diffuse thickening of the intralobular septa  involving the right upper, middle, and lower lobes. Increased  compressive atelectasis at the right lung base. Persistent nodular  opacities involving the right lung, largest at the superior segment  lower lobe. There is also increased interlobular septal thickening  involving the left lower lobe with lesser involvement of the left  upper lobe and lingula.     UPPER  ABDOMEN: Extensive fluid distention of the stomach. Common bile  duct stent is partially visualized but appears stable in location.  Increased periportal edema. Gallbladder is moderately distended  without wall thickening. Small volume perihepatic ascites. Diffuse  thickening of the bilateral adrenal glands has slightly increased from  the prior exam.     MUSCULOSKELETAL: No acute or destructive osseous lesions.                                                                      IMPRESSION:  1.  No acute aortic or arterial findings.     2.  SVC is patent. Right internal jugular and subclavian veins are  patent. Left innominate and subclavian veins are not well evaluated on  arterial phase imaging.     3.  Fluid distention with increased mucosal enhancement involving the  esophagus with irregular ulcerated circumferential mass at the distal  esophagus/GE junction. The stomach is also markedly distended with  fluid. Duodenum is relatively decompressed although not fully  evaluated. Query possible stomach outlet obstructive process. Patient  may benefit from NG decompression.     4.  Progressive metastatic disease with increased findings of  lymphangitic metastatic disease involving the right greater than left  lung and likely accounting for the increased soft tissue thickening  involving the right greater than left mario.     5.  New trace perihepatic ascites. The gallbladder is distended  without wall thickening to suggest acute cholecystitis. Increased  periportal edema, nonspecific.     6.  Additional findings are similar to the prior exam.    CT neck 6/19:                                                               IMPRESSION:  1.  Minor carotid artery atherosclerosis. No dissection or  hemodynamically significant narrowing in the neck by NASCET criteria.  2.  The arterial timing of the contrast bolus does not allow for  assessment of the previously demonstrated left internal jugular  thrombus.  3.  Mildly  progressed cervical lymphadenopathy.  4.  Esophageal wall appears more thickened and enhancing than on  prior, potentially with some surrounding inflammatory change.  5.  Bilateral pleural effusions      CONSULTATION ASSESSMENT AND PLAN:    58 year old male with history of metastatic esophageal cancer previously followed by [facility] oncology (Dr. Arce), recent DVT, admitted 6/19 with developing a lump on his neck with CT imaging progressive metastatic disease in the lung, with fluid distended esophagus and associated distal esophageal mass, distended stomach concerning for gastric outlet obstruction.     1. Abnormal CT esophagus/stomach in the setting of metastatic esophageal cancer. Most consistent with malignancy related gastric outlet obstruction. Does not have NG. No nausea, vomiting, abd pain, distension, or dysphagia.   --NPO.   --IV PPI.   --Will discuss possible EGD/stent further with Dr. Wan.   --If EGD indicated will need NG to remove fluid and likely hold anticoagulation.     2. Left internal jugular/subclavian DVT. Improving on imaging. Diagnosed initially in [place] in May this year. On Xarelto but stopped 5 days ago. Had been on IV heparin 6/19, now transitioned to Lovenox.     3. Metastatic esophageal cancer. Not on any dedicated treatment at this time. Oncology and palliative care consulted.   --Await recs.     Discussed with VALERIANO Tavarez from hospitalist service.     Total time spent: 45 minutes was spent providing patient care, including patient evaluation, reviewing documentation/test results, and . Thank you for asking us to participate in the care of this patient.    VALERIANO Forrest  [place] [facility] ([facility])         ADDENDUM: Discussed with Dr. Wan. Recommend IR placed NG tube. Depending on how he responds, will consider EGD. This will not be done today. Ok to hold off on transitioning to heparin for now. Reviewed with VALERIANO Tavarez.

## 2023-06-20 NOTE — PROGRESS NOTES
7:33 AM    This nurse attempted to assist patient getting out of bed; however, patient reports that she feels increasingly weak and does not feel as if she is able to get out of bed. This nurse spoke with Dr. Rosemarie Camacho about this, asked about CT scan as well. No new orders obtained. Awaiting niece for discharge. Will monitor. 8:40 AM    At time of d/c, patient assisted with dressing. Patient was able to sit up with minimal assistance and helped into wheelchair with two nurse assist.  Patient was able to get in niece's car with help of niece only. Patient denied complaint. No acute distress noted. Patient verbalized understanding of d/c instruction, followup. Patient's d/c instructions given to family. PRIMARY DIAGNOSIS:   OUTPATIENT/OBSERVATION GOALS TO BE MET BEFORE DISCHARGE:  1. ADLs back to baseline: Yes    2. Activity and level of assistance: Up with standby assistance.    3. Pain status: Pain free.    4. Return to near baseline physical activity: Yes     Discharge Planner Nurse   Safe discharge environment identified: Yes  Barriers to discharge: Yes       Entered by: Sandra Holland RN 06/20/2023        Pt is A&Ox4. VSS on 1L. Pt denies pain at this time.   Please review provider order for any additional goals.   Nurse to notify provider when observation goals have been met and patient is ready for discharge.

## 2023-06-20 NOTE — PLAN OF CARE
PRIMARY DIAGNOSIS: Dehydration/sob/lightheadedness     OUTPATIENT/OBSERVATION GOALS TO BE MET BEFORE DISCHARGE  1. Orthostatic performed: No    2. Tolerating PO medications:  have not given oral meds    3. Return to near baseline physical activity: No    4. Cleared for discharge by consultants (if involved): No    Discharge Planner Nurse   Safe discharge environment identified: Yes  Barriers to discharge: Yes       Entered by: Priyanka Florence RN 06/19/2023 10:42 PM     Please review provider order for any additional goals.   Nurse to notify provider when observation goals have been met and patient is ready for discharge.    Temp: 98.5  F (36.9  C) Temp src: Oral BP: 96/65 Pulse: 102   Resp: 20 SpO2: 92 % O2 Device: Nasal cannula Oxygen Delivery: 1 LPM desating when falls asleep to 88% on RA, 1L applied now sating in lower 90s    A&Ox4. Up SBA- refusing gait belt. Mildly sob when up. Still coughing up phlegm, pt requested suction machine- suction machine setup for pt. Complained of abdominal pain, prn IV dilaudid given x1. Tolerating ice chips. Abdominal distended but no nausea/vomiting. Plan- npo/ice chips, IVF, Heparin drip discontinued, now on lovenox every 12 hours, pain control, oncology consult, palliative consult, monitor on tele, SW consult.

## 2023-06-20 NOTE — PLAN OF CARE
ROOM # 204-1    Living Situation (if not independent, order SW consult):  Facility name:  : Sister Radha, pt lives with his sister Radha     Activity level at baseline: Ind  Activity level on admit: SBA    Who will be transporting you at discharge: Radha     Patient registered to observation; given Patient Bill of Rights; given the opportunity to ask questions about observation status and their plan of care.  Patient has been oriented to the observation room, bathroom and call light is in place.    Discussed discharge goals and expectations with patient/family.

## 2023-06-20 NOTE — PROGRESS NOTES
"[facility]    Medicine Progress Note - Hospitalist Service    Date of Admission:  6/19/2023    Assessment & Plan Noah Morrison is a 58 year old male who is known for metastatic esophageal cancer.     He presented with \"walnut sized lump\" left side of neck, upper extremity swelling, fatigue, nausea and coughing that developed 6/19/2023.     In the ED softer blood pressures, tachycardic, borderline hypoxic.  Afebrile. Lab work demonstrated hyponatremia, neutrophilic leukocytosis to 24.7k. Imaging was pursued with US of upper extremity, CTA chest and neck.     Found to have distended stomach, esophagus with fluid, irregular ulcerated circumferential mass at the distal  esophagus/GE junction, stomach is also markedly distended with fluid, outlet obstructive process. Previous DVT in LUE stable. Admitted to hospitalist, Oncology, GI, Palliative Care Consulted.     Gastric Outlet Obstruction   Presented with different symptoms related to esophageal cancer with metastatic disease that seems to be advancing into a terminal phase.  He is having difficulties to swallow including his medications.  Imaging question the possibility of gastric outlet obstruction.   - NG ordered for decompression, not recommended to be placed at bedside by RN per GI.  Discussed this recommendation with interventional radiology and radiology department  - once NG placed LIS  - GI is consulted dissipate will need eventual endoscopy to evaluate gastric distention  - starting empiric Unasyn for now given leukocytosis, softer blood pressures in the ED. Pulmonary symptoms are likely related to metastatic disease but at risk for aspiration pneumonitis  - aspiration precautions, npo for now   - IV PPI  - Oncology and Palliative care consulted for assistance with goals of care   - pending goals of care will need ongoing assessment for nutrition currently IV fluids with D5   - pain management - IV dilaudid currently, consider " transdermal butrans or pca if uncontrolled currently not utilizing po medications     Metastatic Esophageal cancer, progressive with Failure to thrive   - Patient of Dr. Arce's. He was seen for esophageal cancer and was treated with FOLFOX + Nivolumab. He was getting palliative chemotherapy in [place],  now transferring back.  He has a follow up with Dr. Arce scheduled on 6/23/23. Most recently had been on Ramicurumab and taxol with neulasta.   - work up here concerning for obstructive gastric outlet process, progressive metastatic disease with increased finding of lymphangitic metastatic disease involving the right greater than left lung.  Soft tissue thickening involving the right greater than left mario. Trace perihepatic ascites.   - Oncology consulted as above. Concern that malignancy has advanced to terminal phase and prognosis is poor.   - monitor spo2, supplement oxygen prn    VTE   Recently diagnosed left internal jugular, subclavian and axillary vein DVT on 5/5/23.  He was on Xarelto, but stopped it ~6/15  -lovenox for now  -consider heparin if interventions needed        Diet: NPO for Medical/Clinical Reasons Except for: Ice Chips    DVT Prophylaxis: Enoxaparin (Lovenox) SQ  Lawler Catheter: Not present  Lines: PRESENT             Cardiac Monitoring: None  Code Status: Full Code      Clinically Significant Risk Factors Present on Admission               # Drug Induced Coagulation Defect: home medication list includes an anticoagulant medication                  Disposition Plan      Expected Discharge Date: 06/24/2023           home with [facility] vs hospice.      Nydia Gates PA-C  Hospitalist Service  [facility]  Securely message with [employer] (more info)  Text page via [facility] Paging/Directory   ______________________________________________________________________    Interval History   Assumed care, reports diffuse abdominal discomfort. Non productive cough. Afebrile. SOB with  activity. No nausea, vomiting. Sister at bedside.     Physical Exam   Vital Signs: Temp: 98.5  F (36.9  C) Temp src: Oral BP: 96/68 Pulse: 90   Resp: 17 SpO2: 95 % O2 Device: Nasal cannula Oxygen Delivery: 1 LPM  Weight: 128 lbs 0 oz    Constitutional: Awake, alert, no apparent distress  Respiratory:  Normal work of breathing.  Diminished lung sounds bilaterally.  Cardiovascular: Regular rate and rhythm, normal S1 and S2, and no murmur appreciated.   HEENT: Left sided mass.  Posterior oropharynx clear.  GI: Bowel sounds present. soft, non-distended, non-tender.   Skin/Integument: Warm, dry. no peripheral edema.  Neuro: No focal deficits. Moving all extremities with normal strength. Coordination and sensation grossly intact. Speech clear. No focal deficits.   Psych: Appropriate affect.      Medical Decision Making       65 MINUTES SPENT BY ME on the date of service doing chart review, history, exam, documentation & further activities per the note.      Data   ------------------------- PAST 24 HR DATA REVIEWED -----------------------------------------------[id number]}

## 2023-06-20 NOTE — PROGRESS NOTES
"PRIMARY DIAGNOSIS:   OUTPATIENT/OBSERVATION GOALS TO BE MET BEFORE DISCHARGE:  1. ADLs back to baseline: Yes    2. Activity and level of assistance: Up with standby assistance.    3. Pain status: Pain free.    4. Return to near baseline physical activity: Yes     Discharge Planner Nurse   Safe discharge environment identified: Yes  Barriers to discharge: Yes       Entered by: Sandra Holland RN 06/20/2023        BP 93/63 (BP Location: Right arm)   Pulse 91   Temp 98.1  F (36.7  C) (Oral)   Resp 16   Ht 1.727 m (5' 8\")   Wt 58.1 kg (128 lb)   SpO2 91%   BMI 19.46 kg/m        Pt is A&Ox4. VSS on 1L. Pt denies pain or nausea. Pt is NPO except ice chips. Tele: SR 88. Consults: Oncology/Palliative care/SW.     Please review provider order for any additional goals.   Nurse to notify provider when observation goals have been met and patient is ready for discharge.  "

## 2023-06-21 NOTE — PLAN OF CARE
"PRIMARY DIAGNOSIS: ACUTE PAIN  OUTPATIENT/OBSERVATION GOALS TO BE MET BEFORE DISCHARGE:  1. Pain Status: No improvement noted, per pt. Pt. appears drowsy and intermittently confused.    2. Return to near baseline physical activity: No    3. Cleared for discharge by consultants (if involved): No        Pt. Appearing very drowsy tonight,appears to have difficulty keeping eyes open at times.RN concerned re: sensorium with all of the med changes.RN spoke with SAY.It was decided that the Fentanyl patch would be discontinued-which it was by oncoming RN.O2 sats on 1 L:95%. Pt. Is answering questions,but is quite drowsy and will begin to drift off almost immediately after he answers.When asked about his pain, he maintains that his pain remains a \"7-8\". It appears to be unlikely that he can accurately assess his pain at this point.Fentanyl patch is off and meds will be judiciously administered until pt. Appears to be more clear.Will con't to monitor closely and assess pain/sensorium.  "

## 2023-06-21 NOTE — PLAN OF CARE
"PRIMARY DIAGNOSIS: ACUTE PAIN  OUTPATIENT/OBSERVATION GOALS TO BE MET BEFORE DISCHARGE:  1. Pain Status: No improvement noted.     2. Return to near baseline physical activity: No    3. Cleared for discharge by consultants (if involved): No         This late afternoon, pt.appeared to be somewhat confused, wanting to \"get up and go for a walk\".Very insistent.Sister and this RN helped pt. to bathroom as he stated that he thought he may need to have a bowel movement.Waited outside bathroom for pt.to finish and he was walked back to bed.X ray for placement obtained.RN left to get connector for NGT and by the time RN returned to room, pt. had pulled NGT out.Sister was in the room when this occurred.RN again explained rationale for NG, pt.refused to have it replaced tonight.Sister remains in room with pt.    "

## 2023-06-21 NOTE — PROGRESS NOTES
[facility]  Palliative Care Progress Note    ADDENDUM:  Patient contacted provider to update on plan of care to discharge today with hospice enrollment appointment today.  POLST form completed and discharge comfort medications sent to discharge pharmacy in order to facilitate patient's goals for care.  Hospitalist updated and collaborated for patient's goals.     Assessment & Plan   Recommendations:  GOALS OF CARE:     Limited optimizing treatments, plan for discharge home to hospice.  Reasonable to continue with current interventions while planning discharge (24-48 hours)     ADVANCE CARE PLANNING:    No health care directive on file. Per  informed consent policy next of kin should be involved if patient becomes unable.     There is no POLST form on file, plan to complete prior to DC.    Code status: DNR, do NOT intubate     MEDICAL MANAGEMENT:   #Pain,sub acute, associated with intraabdominal malignancy.    Opioids: hydromorphone (Dilaudid) - agree with IV formulation, will trial oral formulations and monitor for tolerance     Morphine solution 2mg/mL every 4 hours prn    Acetaminophen (Tylenol), PRN    NSAIDs:  avoid due to anticoagulation    Heat, Ice and Meditation     #Dyspnea, patient requiring supplemental oxygen since admission.      Oxygen    Repositioning    management of underlying etiology     #General Weakness/Debility     Physical Therapy     #Dysphagia    Nutrition Consult - following GI work up completion    Diet advancement per plan of care conversations    #Nausea  Increased nausea today, likely ongoing outlet obstruction.  Patient declines procedures and discontinued NGT overnight. Goal for comfort and discharge home.    Zofran IV/ODT prn.  Will trial ODT as primary route and monitor for tolerance    Agree with scopolamine patch    Recommend G-tube placement for comfort.  Would allow for PO intake for comfort/pleasure.  Would allow for venting in the event of gas or  gastric secretion accumulation.     #Discharge planning  Patient requesting to discharge to sister's home on hospice.    SW consult to facilitate hospice enrollment.    Medical Decision Making and Goals of Care:  Discussed on June 21, 2023 with KATERYNA Pham CNP: Met with patient and two sisters at the bedside.    Reviewed overnight events and symptom burden, modifications to symptom management plan reviewed above.  Discussed patient's goals for comfort and they noted that discharge home to support a hospice based plan of care would be his preference.  Patient reviewed his wish for discharge from the hospital.  Discussed his wish to avoid procedures and focus on his symptom management.  Reviewed the recommendation for hospice enrollment and shift in his plan of care.  He verbalized agreement and understanding of the hospice philosophy.  Reviewed hospice benefit and services that were provided.  Discussed disposition planning and SW role.  Discussed recommendation for discharge tomorrow in order to manage symptoms overnight and maintain comfort.  Patient noted a wish to see his oncologist today in order to hear their recommendation.  Discussed beginning of disposition planning while awaiting oncology visit so as to not delay discharge, he was agreeable to this plan. His sisters verbalized understanding and agreement to above conversation.  They denied questions or concerns.    We discussed  potential risks and rationale of attempting cardiac resuscitation, intubation, and mechanical ventilation.  We also discussed probability of survival as well as quality of life implications.  Based on this discussion, patient or surrogate response/decision: modified code status to DNR, Do NOT intubate.    KATERYNA Pham CNP  [facility], Palliative Care  Securely message with the Vocera Web Console (learn more here)  Text page via [employer]       Time Spent on this Encounter   Medical Decision  Making     ------------------ MEDICAL DECISION MAKING ------------------------------------------------------------------------------------------------------  MANAGEMENT DISCUSSED with the following over the past 24 hours: primary team, nursing staff, social work, patient and family   NOTE(S)/MEDICAL RECORDS REVIEWED over the past 24 hours: primary team, consult services, nursing notes  Tests REVIEWED in the past 24 hours:  - See lab/imaging results included in the data section of the note  SUPPLEMENTAL HISTORY, in addition to the patient's history, over the past 24 hours obtained from:   - Sibling  Medical complexity over the past 24 hours:  -------------------------- HIGH RISK FOR MORBIDITY -------------------------------------------------------------  - Parenteral (IV) CONTROLLED SUBSTANCES ordered  - Decision to DE-ESCALATE CARE based on prognosis        Advance Care Planning Discussion 6/21/2023. IRobyn APRN CNP met with Patient and their family today at the hospital to discuss Advance Care Planning. Noah PENA Tamiko does have decisional capacity and was present for this discussion.  Those present were informed of the voluntary nature of this discussion and wished to proceed.  The discussion included: goals/wishes, family support, hospice enrollment, code status preferences. This discussion began at 0915 and ended at 1000 for a total of 45 minutes    Review of Systems    CONSTITUTIONAL: NEGATIVE for fever, chills, change in weight  ENT/MOUTH: NEGATIVE for ear, mouth and throat problems  RESP: NEGATIVE for significant cough or SOB  CV: NEGATIVE for chest pain, palpitations or peripheral edema  GI: NEGATIVE for nausea, abdominal pain, heartburn, or change in bowel habits, abdominal pain generalized and nausea      Physical Exam   Temp:  [97.8  F (36.6  C)-98.9  F (37.2  C)] 98.9  F (37.2  C)  Pulse:  [] 104  Resp:  [16-18] 16  BP: ()/(60-84) 102/60  SpO2:  [93 %-98 %] 97 %  128 lbs 0  oz  Exam:  GEN:  Alert, oriented x 3, facial grimace.  HEENT:  Normocephalic/atraumatic, no scleral icterus, no nasal discharge, mouth moist.  Mass on left side of neck  CV:  RRR, S1, S2; no murmurs or other irregularities noted  RESP:   Symmetric chest rise on inhalation noted.  Normal respiratory effort.  ABD:  Rounded, tender/non-distended.  +BS  EXT:  LUE swelling.    SKIN:  Dry to touch, no exanthems noted in the visualized areas.    NEURO: no focal deficits  PAIN BEHAVIOR: Cooperative  Psych:  Flat affect.  Calm, cooperative.    Medications     dextrose 5% and 0.45% NaCl + KCl 20 mEq/L 100 mL/hr at 06/21/23 0601       ampicillin-sulbactam  3 g Intravenous Q6H     enoxaparin ANTICOAGULANT  1 mg/kg Subcutaneous Q12H     fentaNYL  12 mcg Transdermal Q72H     fentaNYL   Transdermal Q8H     pantoprazole  40 mg Intravenous Daily with breakfast     sodium chloride (PF)  3 mL Intracatheter Q8H       Data  I have personally reviewed all recent labs and imaging  Results for orders placed or performed during the hospital encounter of 06/19/23 (from the past 24 hour(s))   CEA   Result Value Ref Range    CEA 9.4 ng/mL   XR Abdomen Port 1 View    Narrative    EXAM: XR ABDOMEN PORT 1 VIEW  LOCATION: [facility]  DATE: 6/20/2023    INDICATION: Confirm NG placement  COMPARISON: None.      Impression    IMPRESSION: NG tube tip in the antrum of the stomach.   Basic metabolic panel   Result Value Ref Range    Sodium 132 (L) 136 - 145 mmol/L    Potassium 3.8 3.4 - 5.3 mmol/L    Chloride 100 98 - 107 mmol/L    Carbon Dioxide (CO2) 21 (L) 22 - 29 mmol/L    Anion Gap 11 7 - 15 mmol/L    Urea Nitrogen 4.4 (L) 8.0 - 23.0 mg/dL    Creatinine 0.52 (L) 0.67 - 1.17 mg/dL    Calcium 7.8 (L) 8.8 - 10.2 mg/dL    Glucose 124 (H) 70 - 99 mg/dL    GFR Estimate >90 >60 mL/min/1.73m2   CBC with platelets   Result Value Ref Range    WBC Count 18.0 (H) 4.0 - 11.0 10e3/uL    RBC Count 3.21 (L) 4.40 - 5.90 10e6/uL    Hemoglobin  9.5 (L) 13.3 - 17.7 g/dL    Hematocrit 30.6 (L) 40.0 - 53.0 %    MCV 95 78 - 100 fL    MCH 29.6 26.5 - 33.0 pg    MCHC 31.0 (L) 31.5 - 36.5 g/dL    RDW 17.6 (H) 10.0 - 15.0 %    Platelet Count 373 150 - 450 10e3/uL

## 2023-06-21 NOTE — PLAN OF CARE
"PRIMARY DIAGNOSIS: ACUTE PAIN  OUTPATIENT/OBSERVATION GOALS TO BE MET BEFORE DISCHARGE:  1. Pain Status: Pt. Rates pain a \"7-8\" Despite receiving IV Dilaudid.    2. Return to near baseline physical activity: No    3. Cleared for discharge by consultants (if involved): No    VSS,afeb this AM. Sister in room with pt.Lungs clear,hypo BS, abdomen somewhat distended.No edema noted.Continues to have some dyspnea on exertion. D5.45%NS w/20MeQ KCL @100cc/hr running.Will con't to monitor and treat for pain.  "

## 2023-06-21 NOTE — PROGRESS NOTES
[facility]    Hematology/Oncology Follow-up Note      Today's Date: 06/21/23  Date of Admission:  6/19/2023  Reason for Consult: Metastatic esophageal cancer      ASSESSMENT/PLAN:  Noah is a 58-year-old male with metastatic esophageal cancer    Had a detailed discussion with the patient along with his sister that I would recommend hospice care.  At this point he does not have any good options for chemotherapy that would help with response and improve his quality of life.  GI work-up underway to see if he can get a stent placed if not I would recommend surgery to see if they could do a venting gastrostomy so he is able to eat for palliative reasons.  Again I would recommend hospice.  Patient understands that we have nothing to offer at this time We will sign off.  Thank you for the consult    Start hydroxyzine and gabapentin, will give him one dose of dilaudid now. Palliative care following    Total time spent on day of visit, including review of tests, obtaining/reviewing separately obtained history, ordering medications/tests/procedures, communicating with PCP/consultants, and documenting in electronic medical record: 35  minutes    INTERIM HISTORY:  Laying in bed in pain     MEDICATIONS:  Current Facility-Administered Medications   Medication     ampicillin-sulbactam (UNASYN) 3 g vial to attach to  mL bag     dextrose 5% and 0.45% NaCl + KCl 20 mEq/L infusion     enoxaparin ANTICOAGULANT (LOVENOX) injection 60 mg     HYDROmorphone (PF) (DILAUDID) injection 0.3-0.5 mg     lidocaine (LMX4) cream     lidocaine 1 % 0.1-1 mL     melatonin tablet 1 mg     morphine solution 10 mg     naloxone (NARCAN) injection 0.2 mg    Or     naloxone (NARCAN) injection 0.4 mg    Or     naloxone (NARCAN) injection 0.2 mg    Or     naloxone (NARCAN) injection 0.4 mg     ondansetron (ZOFRAN ODT) ODT tab 4 mg    Or     ondansetron (ZOFRAN) injection 4 mg     pantoprazole (PROTONIX) IV push injection 40 mg  "    scopolamine (TRANSDERM) 72 hr patch 1 patch    And     scopolamine (TRANSDERM-SCOP) Patch in Place     senna-docusate (SENOKOT-S/PERICOLACE) 8.6-50 MG per tablet 1-2 tablet     sodium chloride (PF) 0.9% PF flush 3 mL     sodium chloride (PF) 0.9% PF flush 3 mL           ALLERGIES:  No Known Allergies      PHYSICAL EXAM:  Vital signs:  Temp: 98.9  F (37.2  C) Temp src: Oral BP: 102/60 Pulse: 104   Resp: 18 SpO2: 97 % O2 Device: None (Room air) Oxygen Delivery: 1 LPM Height: 172.7 cm (5' 8\") Weight: 58.1 kg (128 lb)  Estimated body mass index is 19.46 kg/m  as calculated from the following:    Height as of this encounter: 1.727 m (5' 8\").    Weight as of this encounter: 58.1 kg (128 lb).    ECOGENERAL/CONSTITUTIONAL: No acute distress.  Laying in bed   tachycardic    LABS:  CBC RESULTS:   Recent Labs   Lab Test 23  0713   WBC 18.0*   RBC 3.21*   HGB 9.5*   HCT 30.6*   MCV 95   MCH 29.6   MCHC 31.0*   RDW 17.6*          Recent Labs   Lab Test 23  0713 23  0440   * 132*   POTASSIUM 3.8 3.6   CHLORIDE 100 99   CO2 21* 24   ANIONGAP 11 9   * 124*   BUN 4.4* 8.7   CR 0.52* 0.61*   JILL 7.8* 7.5*         PATHOLOGY:  noted    IMAGING:  noted        Malcolm Pascual MD  Hematology/Oncology  [facility]  "

## 2023-06-21 NOTE — PLAN OF CARE
INPATIENT PLAN OF CARE PROGRESS NOTE     Orientation: A&Ox4. Resting between cares.   Neuro: WDL  Pain status: Intermittent abdominal pain, managed w/ PRN morphine  Activity: SBA w/ gait belt   Resp: WDL. Remains on RA. Infrequent cough, productive at times.   Cardiac: WDL  GI: Intermittent nausea, Scopolamine patch placed today, given PRN Zofran. Diet advanced this AM. Currently tolerating small amts of clears, poor appetite     : WDL  Skin: WDL    LDA: Peripheral IV   Infusions: Dextrose 5% and 0.45% NaCl + KCl 20 mEq/L infusion at 100 mL/hr   Pertinent Labs: WBC 18.0         Diet: Mech/Soft - poor appetite. Tolerating small amts of clear liquids   Discharge Plan: CM/SW, Palliative, Oncology, GI consulted. Continues Unasyn Q6H. Continue pain management and comfort measures. Patient will discharge to home with Hospice support. Sister will provide transportation.

## 2023-06-21 NOTE — PROGRESS NOTES
GI Chart Check    Palliative care consult reviewed. Patient decided to transition to hospice cares. No further GI interventions needed. Will sign off, please call with questions.     VALERIANO Forrest  [facility] ([facility])

## 2023-06-21 NOTE — PLAN OF CARE
"Care from [id number]    Inpatient Progress Note:  For complete assessment see flow sheet documentation.    /77 (BP Location: Right arm)   Pulse 99   Temp 97.8  F (36.6  C) (Oral)   Resp 16   Ht 1.727 m (5' 8\")   Wt 58.1 kg (128 lb)   SpO2 96%   BMI 19.46 kg/m      Pt is alert and oriented x4. Reported mild discomfort, denied nausea. Maintained O2 saturations above 955 and remained on room air most of the night. IV infusing [id number] at 100ml/hr.  Up with SBA, does not always call for assistance. Plan to follow up with NG placement, has been NPO all night.     Will continue to monitor and provide cares.    Alysa Salinas RN   "

## 2023-06-21 NOTE — PLAN OF CARE
Patient's After Visit Summary was reviewed with patient and/or sisters.   Patient verbalized understanding of After Visit Summary, recommended follow up and was given an opportunity to ask questions.   Discharge medications sent home with patient/family: YES   Discharged with sisters.    IV access discharged. All personal belongings returned to patient. Escorted to exit via wheelchair with staff.

## 2023-06-21 NOTE — PLAN OF CARE
"PRIMARY DIAGNOSIS: ACUTE PAIN  OUTPATIENT/OBSERVATION GOALS TO BE MET BEFORE DISCHARGE:  1. Pain Status: No improvement noted. Consider adjustment in pain regimen.    2. Return to near baseline physical activity: No    3. Cleared for discharge by consultants (if involved): No    Flyer came up to insert NGT for decompression.NGT was successfully placed,x ray ordered to ensure correct placement.Sister was in room along with this RN during placement.Sister informed this RN that pt. Is very naive when it comes to any sort of medical procedure,and post insertion, pt. Seemed as though he was in shock.He was staring straight ahead, not answering RN's questions.Seemed very anxious, sister agreed. RN did give pt. 1 mg Ativan IV post procedure to facilitate relaxation.RN consulted staff re: sister's concern that pt. Wasn't getting adequate pain control.Pain orders were changed to 0.3-0.5mg Dilaudid Q4 hrs PRN.A 12mcg Fentanyl patch was also ordered and applied to pt's L upper arm.Pt. continues to rate pain \"7-8\" despite changes to pain med regime.Currently resting,sister in room. Will con't to monitor and strive for better pain control.    "

## 2023-06-21 NOTE — CONSULTS
Care Management Discharge Note    Discharge Date: 06/21/2023     Discharge Disposition:  Home    Discharge Services:  Hospice    Discharge DME:  None anticipated    Discharge Transportation:  Sister    Private pay costs discussed: Not applicable    Does the patient's insurance plan have a 3 day qualifying hospital stay waiver?  No    Education Provided on the Discharge Plan:  Yes  Persons Notified of Discharge Plans: Patient, sister    Handoff Referral Completed: No    Additional Information:  SW consulted for discharge planning. Notified by Palliative care that pt has elected to discharge home with hospice, planned discharge tomorrow. SW received call from PA this afternoon with plans to discharge home today.     SW met with patient and sister Radha at bedside. Pt is originally from [place] but has been staying with his sister in [place]. They are planning for a discharge home today with hospice. They do not have a hospice agency preference. Radha reports that her sister has a planned meeting at 3pm at pt's sisters home with [facility]. Radha reports that hospice informed them admission would be today at home vs tomorrow morning. Pt does not anticipate any immediate DME needs. Sister will transport home.     MARILYN will continue to follow.     DAYSI Kruger, [facility]   Inpatient Care Coordination  [facility]   [phone]

## 2023-06-21 NOTE — PLAN OF CARE
Temp: 98.7  F (37.1  C) Temp src: Oral BP: 97/84 Pulse: 88   Resp: 16 SpO2: 95 % O2 Device: Nasal cannula Oxygen Delivery: 1 LPM continuous pulse ox on     Very drowsy and appeared uncomfortable in bed, fentanyl patch removed per discussion with obs provider. After patch was removed pt did become more alert, pt does not remember pulling his NG tube out, or most of the evening. Prn IV dilaudid given x1 for abdominal pain which brought pain down to 1/10- pt now appears comfortable in bed. Up SBA- refusing gait belt. Still coughing up phlegm, not as much tonight than last night. Writer did discuss placing a new NG tube this evening, pt still refusing and pt will address this tomorrow. Ice chips offered, pt declined. Abdominal distended, mildly nauseous/ no vomiting. Has been sleeping most of the evening. Plan- npo/ice chips, IVF, pain control, oncology following, palliative following, SW following, GI following, unasyn every 6 hours.

## 2023-06-22 NOTE — PROGRESS NOTES
"Writer received a call from Edilberto's sister Radha. Radha states that they just met with the [facility] team. \"They have been wonderful.\" Radha states that Edilberto has everything he needs to be comfortable right now. She is grateful for the care from Dr. Arce.     Louise Willis, RN on 6/22/2023 at 12:22 PM    "

## 2023-06-22 NOTE — PROGRESS NOTES
Writer reviewed hospital course and noted that Edilberto has decided to enroll in hospice care. Writer spoke to Edilberto and he reports that the hospice team is currently at his home doing a consult. He will return call to writer when they have left.     Louise Willis RN on 6/22/2023 at 9:27 AM

## 2023-06-24 NOTE — DISCHARGE SUMMARY
"[facility]  Hospitalist Discharge Summary      Date of Admission:  6/19/2023  Date of Discharge:  6/21/2023  4:57 PM  Discharging Provider: Nydia Gates PA-C  Discharge Service: Hospitalist Service    Discharge Diagnoses   Metastatic esophageal cancer with numerous complications  Malnutrition, concern for gastric obstructive process  Failure to thrive  History of VTE previously on treatment anticoagulation     Follow-ups Needed After Discharge   Follow-up Appointments     Follow-up and recommended labs and tests       Follow up with hospice team.          Unresulted Labs Ordered in the Past 30 Days of this Admission     No orders found from 5/20/2023 to 6/20/2023.      These results will be followed up by n/a.    Discharge Disposition   Discharged to home to sisters with hospice enrollment anticipated AM of 6/22  Condition at discharge: Terminal    Hospital Course   Noah Morrison is a 58 year old male who is known for metastatic esophageal cancer.      He presented with \"walnut sized lump\" left side of neck, upper extremity swelling, fatigue, nausea and coughing that developed 6/19/2023.      In the ED softer blood pressures, tachycardic, borderline hypoxic.  Afebrile. Lab work demonstrated hyponatremia, neutrophilic leukocytosis to 24.7k. Imaging was pursued with US of upper extremity, CTA chest and neck.      Found to have distended stomach, esophagus with fluid, irregular ulcerated circumferential mass at the distal  esophagus/GE junction, stomach is also markedly distended with fluid, outlet obstructive process. Previous DVT in LUE stable. Admitted to hospitalist, Oncology, GI, Palliative Care Consulted.   Discharging with hospice enrollment.         Problem List:    Gastric Outlet Obstruction   Presented with different symptoms related to esophageal cancer with metastatic disease that seems to be advancing into a terminal phase.  He is having difficulties to swallow including his " medications.  Imaging question the possibility of gastric outlet obstruction.   Had NG which he self removed. GI consulted see if he can get a stent placed if not I would recommend surgery to see if they could do a venting gastrostomy so he is able to eat for palliative reasons. Patient and family elected for home with hospice enrollment.      Metastatic Esophageal cancer, progressive with Failure to thrive   - Patient of Dr. Arce's. He was seen for esophageal cancer and was treated with FOLFOX + Nivolumab. He was getting palliative chemotherapy in [place],  now transferring back.  He has a follow up with Dr. Arce scheduled on 6/23/23. Most recently had been on Ramicurumab and taxol with neulasta.   - work up here concerning for obstructive gastric outlet process, progressive metastatic disease with increased finding of lymphangitic metastatic disease involving the right greater than left lung.  Soft tissue thickening involving the right greater than left mario. Trace perihepatic ascites.   - Oncology consulted as above. Concern that malignancy has advanced to terminal phase and prognosis is poor.   -as above, hospice enrollment      VTE   Recently diagnosed left internal jugular, subclavian and axillary vein DVT on 5/5/23.  He was on Xarelto, but stopped it ~6/15  Was treated with lovenox while admitted.        Consultations This Hospital Stay   PHARMACY IP CONSULT  HEMATOLOGY & ONCOLOGY IP CONSULT  PALLIATIVE CARE ADULT IP CONSULT  CARE MANAGEMENT / SOCIAL WORK IP CONSULT  GASTROENTEROLOGY IP CONSULT  INTERVENTIONAL RADIOLOGY ADULT/PEDS IP CONSULT  CARE MANAGEMENT / SOCIAL WORK IP CONSULT    Code Status   Prior    Time Spent on this Encounter   INydia PA-C, personally saw the patient today and spent greater than 30 minutes discharging this patient.       Nydia Gates PA-C  ______________________________________________________________________    Interval History  Met with sisters, patient.  "Transition to hospice. He took NG out yesterday evening. Wants to go home \"ASAP\".     Physical Exam   Vital Signs:                    Weight: 128 lbs 0 oz    Constitutional: Awake, alert, no apparent distress. Left sided neck mass.   Respiratory:  Normal work of breathing. Lungs clear to auscultation bilaterally, no crackles or wheezing.  Cardiovascular: tachycardic rate, regular rhythm, normal S1 and S2, and no murmur appreciated.   GI: Bowel sounds present. soft, non-distended, non-tender.   Skin/Integument: Warm, dry. no peripheral edema.         Primary Care Physician   Physician No Ref-Primary    Discharge Orders      Hospice Referral      Reason for your hospital stay    Complications related to malignancy     Follow-up and recommended labs and tests     Follow up with hospice team.     Activity    Your activity upon discharge: activity as tolerated     Diet    Follow this diet upon discharge: soft diet as tolerated       Significant Results and Procedures   Results for orders placed or performed during the hospital encounter of 06/19/23   CTA Chest with Contrast    Narrative    CTA CHEST WITH CONTRAST    PROCEDURE DATE: 6/19/2023 2:58 PM     HISTORY: New left arm swelling and left neck mass, history of  esophageal cancer    COMPARISON: CT from 5/3/2023    TECHNIQUE: Helical acquisition through the chest was performed during  the arterial phase of contrast enhancement. 2D and 3D reconstructions  performed by the CT technologist. Dose reduction techniques were used.  CONTRAST: 80 mL Isovue-370    FINDINGS:   ARTERIAL FINDINGS: Heart is normal in size. Minimal pericardial  thickening without pericardial effusion. Central pulmonary arteries  appear patent.    Thoracic aorta is normal in caliber without dissection or intramural  hematoma. Widely patent three-vessel origin off the aortic arch. No  significant arterial stenosis involving the great vessels. Descending  thoracic aorta and upper abdominal aorta are " widely patent and normal  caliber. No acute arterial findings.    A right internal jugular approach port terminates at the cavoatrial  junction. Superior vena cava is opacified without evidence of SVC  thrombosis. The left brachiocephalic vein is partially visualized and  patent near its junction with the SVC. It is not well evaluated  through the superior mediastinum. Left subclavian vein is not well  evaluated on arterial phase imaging.    Azygos vein appears patent with nonopacified blood flowing into the  SVC.    NONVASCULAR:  MEDIASTINUM: New fluid distention with circumferential mucosal  enhancement and abnormal wall thickening at the distal esophagus  involving the GE junction. This is contiguous with extensive stomach  fluid distention. New generalized soft tissue thickening involving the  hilum. Mild generalized soft tissue thickening involving the left  hilar region. No mediastinal gas to suggest esophageal perforation.  Stable metastatic-appearing lymph nodes involving the left insula.    LUNGS/PLEURA: Slight interval increase in the now mild to moderate  size right pleural effusion. New trace left pleural effusion. No  pneumothorax. Increased diffuse thickening of the intralobular septa  involving the right upper, middle, and lower lobes. Increased  compressive atelectasis at the right lung base. Persistent nodular  opacities involving the right lung, largest at the superior segment  lower lobe. There is also increased interlobular septal thickening  involving the left lower lobe with lesser involvement of the left  upper lobe and lingula.    UPPER ABDOMEN: Extensive fluid distention of the stomach. Common bile  duct stent is partially visualized but appears stable in location.  Increased periportal edema. Gallbladder is moderately distended  without wall thickening. Small volume perihepatic ascites. Diffuse  thickening of the bilateral adrenal glands has slightly increased from  the prior  exam.    MUSCULOSKELETAL: No acute or destructive osseous lesions.      Impression    IMPRESSION:  1.  No acute aortic or arterial findings.    2.  SVC is patent. Right internal jugular and subclavian veins are  patent. Left innominate and subclavian veins are not well evaluated on  arterial phase imaging.    3.  Fluid distention with increased mucosal enhancement involving the  esophagus with irregular ulcerated circumferential mass at the distal  esophagus/GE junction. The stomach is also markedly distended with  fluid. Duodenum is relatively decompressed although not fully  evaluated. Query possible stomach outlet obstructive process. Patient  may benefit from NG decompression.    4.  Progressive metastatic disease with increased findings of  lymphangitic metastatic disease involving the right greater than left  lung and likely accounting for the increased soft tissue thickening  involving the right greater than left mario.    5.  New trace perihepatic ascites. The gallbladder is distended  without wall thickening to suggest acute cholecystitis. Increased  periportal edema, nonspecific.    6.  Additional findings are similar to the prior exam.    Findings discussed with Dr. Angeles at the time of this interpretation.    ABRAM CHIDLS MD         SYSTEM ID:  [id number]   CTA Neck with Contrast    Narrative    CTA NECK WITH CONTRAST 6/19/2023 3:00 PM    INDICATION: New left arm swelling and left neck mass, history of  esophageal cancer.  TECHNIQUE: Neck CT angiogram with IV contrast. CT images of the neck  vessels obtained during the arterial phase of intravenous contrast  administration. Axial helical 2D reconstructed images and multiplanar  3D MIP reconstructed images of the neck vessels were performed on a  separate workstation. Dose reduction techniques were used.  CONTRAST: 100mL Isovue-370  COMPARISON: Neck CT 5/3/2023     FINDINGS:  Two vessel (bovine) arch.  Carotid arteries are patent with  minor  atherosclerotic change.  No hemodynamically significant stenosis by  NASCET criteria in either carotid system.  Patent vertebral arteries.  No dissection. Right larger than left pleural effusions. Patulous  fluid-filled proximal visualized esophagus. Wall thickening and  suspected adjacent inflammatory stranding within the mediastinum  appears progressed versus the prior neck CT. Persistently abnormal  left level II cervical lymph node may be slightly enlarged. Progressed  right Va lymph nodes.      Impression    IMPRESSION:  1.  Minor carotid artery atherosclerosis. No dissection or  hemodynamically significant narrowing in the neck by NASCET criteria.  2.  The arterial timing of the contrast bolus does not allow for  assessment of the previously demonstrated left internal jugular  thrombus.  3.  Mildly progressed cervical lymphadenopathy.  4.  Esophageal wall appears more thickened and enhancing than on  prior, potentially with some surrounding inflammatory change.  5.  Bilateral pleural effusions     HIMANSHU CHAU MD         SYSTEM ID:  [id number]   US Upper Extremity Venous Duplex Left    Narrative    US UPPER EXTREMITY VENOUS DUPLEX LEFT  6/19/2023 4:19 PM     HISTORY: left upper extremity swelling    COMPARISON: Ultrasound dated 5/3/2023    FINDINGS: Color Doppler and spectral waveform analysis performed.  There is nonocclusive DVT in the left internal jugular vein and in the  lateral left subclavian vein. Remaining deep veins of the left upper  extremity are patent. Overall, the thrombus has improved when compared  to prior exam.      Impression    IMPRESSION: Improving DVT in the left upper extremity.      FORD LAZCANO MD         SYSTEM ID:  [id number]   XR Abdomen Port 1 View    Narrative    EXAM: XR ABDOMEN PORT 1 VIEW  LOCATION: [facility]  DATE: 6/20/2023    INDICATION: Confirm NG placement  COMPARISON: None.      Impression    IMPRESSION: NG tube tip in the antrum of  the stomach.       Discharge Medications   Discharge Medication List as of 6/21/2023  4:32 PM      START taking these medications    Details   HYDROmorphone, STANDARD CONC, (DILAUDID) 1 MG/ML oral solution Take 2 mLs (2 mg) by mouth every 2 hours as needed for moderate to severe pain, Disp-96 mL, R-0, E-Prescribe         CONTINUE these medications which have CHANGED    Details   ondansetron (ZOFRAN ODT) 4 MG ODT tab Take 1 tablet (4 mg) by mouth every 6 hours as needed for nausea or vomiting, Disp-16 tablet, R-0, E-Prescribe         CONTINUE these medications which have NOT CHANGED    Details   gabapentin (NEURONTIN) 100 MG capsule Take 2 capsules (200 mg) by mouth 3 times daily, Disp-180 capsule, R-0, E-Prescribe      hydrOXYzine (ATARAX) 25 MG tablet Take 1-2 tablets (25-50 mg) by mouth 3 times daily as needed for other (Adjuvant for pain), Disp-60 tablet, R-0, E-Prescribe         STOP taking these medications       acetaminophen (TYLENOL) 325 MG tablet Comments:   Reason for Stopping:         dexamethasone (DECADRON) 4 MG tablet Comments:   Reason for Stopping:         megestrol (MEGACE) 40 MG/ML suspension Comments:   Reason for Stopping:         oxyCODONE IR (ROXICODONE) 10 MG tablet Comments:   Reason for Stopping:         prochlorperazine (COMPAZINE) 10 MG tablet Comments:   Reason for Stopping:         rivaroxaban ANTICOAGULANT (XARELTO) 20 MG TABS tablet Comments:   Reason for Stopping:         senna-docusate (SENOKOT-S/PERICOLACE) 8.6-50 MG tablet Comments:   Reason for Stopping:             Allergies   No Known Allergies